# Patient Record
Sex: FEMALE | Race: WHITE | NOT HISPANIC OR LATINO | Employment: OTHER | URBAN - METROPOLITAN AREA
[De-identification: names, ages, dates, MRNs, and addresses within clinical notes are randomized per-mention and may not be internally consistent; named-entity substitution may affect disease eponyms.]

---

## 2017-07-24 ENCOUNTER — OFFICE VISIT (OUTPATIENT)
Dept: CARDIOLOGY | Facility: CLINIC | Age: 62
End: 2017-07-24

## 2017-07-24 VITALS
SYSTOLIC BLOOD PRESSURE: 140 MMHG | HEART RATE: 80 BPM | HEIGHT: 65 IN | DIASTOLIC BLOOD PRESSURE: 62 MMHG | BODY MASS INDEX: 35.29 KG/M2 | WEIGHT: 211.8 LBS

## 2017-07-24 DIAGNOSIS — E11.59 TYPE 2 DIABETES MELLITUS WITH OTHER CIRCULATORY COMPLICATION: ICD-10-CM

## 2017-07-24 DIAGNOSIS — I25.10 CORONARY ARTERY DISEASE INVOLVING NATIVE CORONARY ARTERY OF NATIVE HEART WITHOUT ANGINA PECTORIS: Primary | ICD-10-CM

## 2017-07-24 DIAGNOSIS — E66.09 NON MORBID OBESITY DUE TO EXCESS CALORIES: ICD-10-CM

## 2017-07-24 DIAGNOSIS — E78.49 OTHER HYPERLIPIDEMIA: ICD-10-CM

## 2017-07-24 PROCEDURE — 99214 OFFICE O/P EST MOD 30 MIN: CPT | Performed by: INTERNAL MEDICINE

## 2017-07-24 PROCEDURE — 93000 ELECTROCARDIOGRAM COMPLETE: CPT | Performed by: INTERNAL MEDICINE

## 2017-07-24 RX ORDER — ISOSORBIDE MONONITRATE 30 MG/1
30 TABLET, EXTENDED RELEASE ORAL DAILY
COMMUNITY
End: 2021-08-11

## 2017-07-24 RX ORDER — FUROSEMIDE 20 MG/1
20 TABLET ORAL AS NEEDED
COMMUNITY
End: 2020-03-13 | Stop reason: ALTCHOICE

## 2017-07-24 RX ORDER — LOSARTAN POTASSIUM 100 MG/1
100 TABLET ORAL DAILY
COMMUNITY
End: 2021-07-22

## 2017-07-24 RX ORDER — METOPROLOL SUCCINATE 50 MG/1
50 TABLET, EXTENDED RELEASE ORAL DAILY
COMMUNITY
End: 2020-07-15 | Stop reason: SDUPTHER

## 2017-07-24 RX ORDER — RANITIDINE 150 MG/1
150 TABLET ORAL 2 TIMES DAILY
COMMUNITY
End: 2019-03-12

## 2017-07-24 RX ORDER — AMLODIPINE BESYLATE 5 MG/1
5 TABLET ORAL DAILY
COMMUNITY
End: 2021-07-27 | Stop reason: SDUPTHER

## 2017-07-24 RX ORDER — ASPIRIN 81 MG/1
81 TABLET, CHEWABLE ORAL DAILY
COMMUNITY
End: 2022-12-14 | Stop reason: ALTCHOICE

## 2017-07-24 RX ORDER — RANOLAZINE 500 MG/1
500 TABLET, EXTENDED RELEASE ORAL 2 TIMES DAILY
COMMUNITY
End: 2018-03-09

## 2017-07-24 RX ORDER — SIMVASTATIN 20 MG
20 TABLET ORAL NIGHTLY
COMMUNITY
End: 2020-03-13 | Stop reason: ALTCHOICE

## 2017-07-24 NOTE — PROGRESS NOTES
Date of Office Visit: 2017  Encounter Provider: Gigi Dailey MD  Place of Service: [facility] CARDIOLOGY  Patient Name: Jesse Espinal  :1955  [id number]    Chief Complaint   Patient presents with   • Coronary Artery Disease   :     HPI: Jesse Espinal is a 62 y.o. female  she's here as a new patient she has a history of coronary artery disease in  she had 4 stents put in the [facility] and in  she had 2 stents put in it had her on chronic Brilinta since then she's had some microscopic hematuria and it's been evaluated without source she has a recent history of diagnosis of diabetes and has lost about 10 pounds to stop drinking sweet tea and cut her car back she has hyperlipidemia and has been managed with a low-dose of simvastatin she is a former smoker stopped in  she does have sleep apnea hypertension hyperlipidemia she works at [employer] part-time she has not had any chest pain shortness of breath PND orthopnea and edema she does not exercise much    Past Medical History:   Diagnosis Date   • Acid reflux disease    • Angina pectoris    • Anxiety    • CAD (coronary artery disease)    • Depression    • Diabetes mellitus, type II    • Dysuria    • Fatigue    • Foot pain    • GERD (gastroesophageal reflux disease)    • Hematuria    • Hypercholesterolemia    • Hypertension    • MORRIS (obstructive sleep apnea)        Past Surgical History:   Procedure Laterality Date   • APPENDECTOMY     • CARDIAC CATHETERIZATION  2013    SEVERE 3 VESSEL CAD   • CHOLECYSTECTOMY  2013    DIVERTICULOSIS AND INTERNAL HEMORRHOIDS   • CORONARY ANGIOPLASTY WITH STENT PLACEMENT  x2   • CORONARY STENT PLACEMENT  2013 and 2014    LAD x4   • CYSTOSCOPY  2016   • HYSTERECTOMY     • OOPHORECTOMY         Social History     Social History   • Marital status:      Spouse name: N/A   • Number of children: N/A   • Years of education: N/A     Occupational History    • Not on file.     Social History Main Topics   • Smoking status: Former Smoker     Types: Cigarettes   • Smokeless tobacco: Not on file      Comment: QUIT 2009   • Alcohol use Yes   • Drug use: No   • Sexual activity: Not on file     Other Topics Concern   • Not on file     Social History Narrative       Family History   Problem Relation Age of Onset   • Colon cancer Mother    • Coronary artery disease Mother    • Hypertension Mother    • Breast cancer Mother    • Diabetes Mother    • Heart disease Mother    • Stroke Father    • Liver disease Father    • Coronary artery disease Sister    • Hyperlipidemia Sister    • Coronary artery disease Brother    • Lung cancer Brother    • Hypertension Son        Review of Systems   Constitution: Negative for decreased appetite, fever, malaise/fatigue and weight loss.   HENT: Negative for nosebleeds.    Eyes: Negative for double vision.   Cardiovascular: Negative for chest pain, claudication, cyanosis, dyspnea on exertion, irregular heartbeat, leg swelling, near-syncope, orthopnea, palpitations, paroxysmal nocturnal dyspnea and syncope.   Respiratory: Negative for cough, hemoptysis and shortness of breath.    Hematologic/Lymphatic: Negative for bleeding problem.   Skin: Negative for rash.   Musculoskeletal: Negative for falls and myalgias.   Gastrointestinal: Negative for hematochezia, jaundice, melena, nausea and vomiting.   Genitourinary: Negative for hematuria.   Neurological: Negative for dizziness and seizures.   Psychiatric/Behavioral: Negative for altered mental status and memory loss.       Allergies   Allergen Reactions   • Effient [Prasugrel]    • Other      SHELL FISH   • Sulfa Antibiotics          Current Outpatient Prescriptions:   •  amLODIPine (NORVASC) 5 MG tablet, Take 5 mg by mouth Daily., Disp: , Rfl:   •  aspirin 81 MG chewable tablet, Chew 81 mg Daily., Disp: , Rfl:   •  furosemide (LASIX) 20 MG tablet, Take 20 mg by mouth As Needed., Disp: , Rfl:   •   "isosorbide mononitrate (IMDUR) 30 MG 24 hr tablet, Take 30 mg by mouth Daily., Disp: , Rfl:   •  losartan (COZAAR) 100 MG tablet, Take 100 mg by mouth Daily., Disp: , Rfl:   •  metFORMIN (GLUCOPHAGE) 500 MG tablet, Take 500 mg by mouth Daily With Breakfast., Disp: , Rfl:   •  metoprolol succinate XL (TOPROL-XL) 50 MG 24 hr tablet, Take 50 mg by mouth Daily., Disp: , Rfl:   •  raNITIdine (ZANTAC) 150 MG tablet, Take 150 mg by mouth 2 (Two) Times a Day., Disp: , Rfl:   •  ranolazine (RANEXA) 500 MG 12 hr tablet, Take 500 mg by mouth 2 (Two) Times a Day., Disp: , Rfl:   •  simvastatin (ZOCOR) 20 MG tablet, Take 20 mg by mouth Every Night., Disp: , Rfl:   •  ticagrelor (BRILINTA) 60 MG tablet tablet, Take 60 mg by mouth 2 (Two) Times a Day., Disp: , Rfl:      Objective:     Vitals:    07/24/17 1416   BP: 140/62   Pulse: 80   Weight: 211 lb 12.8 oz (96.1 kg)   Height: 64.5\" (163.8 cm)     Body mass index is 35.79 kg/(m^2).    Physical Exam   Constitutional: She is oriented to person, place, and time. She appears well-developed and well-nourished.   HENT:   Head: Normocephalic.   Eyes: No scleral icterus.   Neck: No JVD present. No thyromegaly present.   Cardiovascular: Normal rate, regular rhythm and normal heart sounds.  Exam reveals no gallop and no friction rub.    No murmur heard.  Pulmonary/Chest: Effort normal and breath sounds normal. She has no wheezes. She has no rales.   Abdominal: Soft. There is no hepatosplenomegaly. There is no tenderness.   Musculoskeletal: Normal range of motion. She exhibits no edema.   Lymphadenopathy:     She has no cervical adenopathy.   Neurological: She is alert and oriented to person, place, and time.   Skin: Skin is warm and dry. No rash noted.   Psychiatric: She has a normal mood and affect.         ECG 12 Lead  Date/Time: 7/24/2017 3:12 PM  Performed by: ARACELI HANSEN  Authorized by: ARACELI HANSEN   Rhythm: sinus rhythm  Clinical impression: normal ECG             Assessment: "       Diagnosis Plan   1. Coronary artery disease involving native coronary artery of native heart without angina pectoris     2. Other hyperlipidemia     3. Type 2 diabetes mellitus with other circulatory complication     4. Non morbid obesity due to excess calories            Plan:       This woman has known disease multiple cardiac risk factors I think is doing well currently from a symptom standpoint I think she indicates desperate risk modification which is critical for her got to discontinue to get her weight down I gave her pamphlet on a plan based diet as well as ask her watch the documentary with the health.  I would like her to increase her activity, to stop her Ranexa today try and get her lipids and her catheter report if her lipids are remotely high when the switch her to Lipitor at 39 and like her to come back and see me in 6 months sooner if she has trouble    Coronary Artery Disease  Assessment  • The patient has no angina    Plan  • Lifestyle modifications discussed include adhering to a heart healthy diet, maintenance of a healthy weight, medication compliance, regular exercise and regular monitoring of cholesterol and blood pressure    Subjective - Objective  • There has been a previous stent procedure using MEERA  • Current antiplatelet therapy includes aspirin 81 mg and ticagrelor 90 mg        As always, it has been a pleasure to participate in your patient's care.      Sincerely,       Gigi Dailey MD

## 2017-12-19 ENCOUNTER — TELEPHONE (OUTPATIENT)
Dept: CARDIOLOGY | Facility: CLINIC | Age: 62
End: 2017-12-19

## 2018-01-22 ENCOUNTER — OFFICE VISIT CONVERTED (OUTPATIENT)
Dept: FAMILY MEDICINE CLINIC | Age: 63
End: 2018-01-22
Attending: NURSE PRACTITIONER

## 2018-03-09 ENCOUNTER — OFFICE VISIT (OUTPATIENT)
Dept: CARDIOLOGY | Facility: CLINIC | Age: 63
End: 2018-03-09

## 2018-03-09 VITALS
SYSTOLIC BLOOD PRESSURE: 144 MMHG | WEIGHT: 209 LBS | HEIGHT: 64 IN | OXYGEN SATURATION: 100 % | DIASTOLIC BLOOD PRESSURE: 78 MMHG | HEART RATE: 74 BPM | BODY MASS INDEX: 35.68 KG/M2

## 2018-03-09 DIAGNOSIS — Z95.5 HISTORY OF CORONARY ARTERY STENT PLACEMENT: ICD-10-CM

## 2018-03-09 DIAGNOSIS — I25.10 CORONARY ARTERY DISEASE INVOLVING NATIVE CORONARY ARTERY OF NATIVE HEART WITHOUT ANGINA PECTORIS: Primary | ICD-10-CM

## 2018-03-09 PROCEDURE — 99213 OFFICE O/P EST LOW 20 MIN: CPT | Performed by: PHYSICIAN ASSISTANT

## 2018-03-09 PROCEDURE — 93000 ELECTROCARDIOGRAM COMPLETE: CPT | Performed by: PHYSICIAN ASSISTANT

## 2018-03-09 NOTE — PROGRESS NOTES
Date of Office Visit: 2018  Encounter Provider: FELIPE Shi  Place of Service: [facility] CARDIOLOGY  Patient Name: Jesse Espinal  :1955    Chief Complaint   Patient presents with   • Coronary Artery Disease     6 month follow up   :     HPI: Jesse Espinal is a 63 y.o. female, new to me, who presents today for follow-up.  Old records have been obtained and reviewed by me.  She is a patient who was first evaluated by Dr. Dailey on 2017.  She has a history of coronary artery disease, and has had stents placed at an outlying facility in  and again in .  She has been on chronic Brilinta therapy ever since.  Dr. Dailey remarked that she did have some microscopic hematuria and his sources never been found.  She also was recently diagnosed with diabetes and cut way back on her sugar and carbohydrates.  She lost 10 pounds.  At that visit she was stable from a cardiac standpoint without complaints of angina or heart failure.  Dr. Dailey remarked that risk factor modification was extremely important for her.  She is here today for a 6 month follow-up.   Over the past 6 months she's been doing fairly well.  She finally joined a gym a few weeks ago.  Her plan is to try to get their 5 days a week, however she's only been a few times since she joint.  She has been able to do some cardiovascular exercise and light weights without much difficulty.  She denies any chest pain, palpitations, shortness of breath, edema, dizziness, or syncope.  She does notice that she is a little short of breath when she starts her exercise routine, but attributes this to being out of shape.  She is still working 2 days a week at [employer], and really went back to work because she missed the people.      Past Medical History:   Diagnosis Date   • Acid reflux disease    • Angina pectoris    • Anxiety    • CAD (coronary artery disease)    • Depression    • Diabetes mellitus, type II     • Dysuria    • Fatigue    • Foot pain    • GERD (gastroesophageal reflux disease)    • Hematuria    • Hypercholesterolemia    • Hypertension    • MORRIS (obstructive sleep apnea)        Past Surgical History:   Procedure Laterality Date   • APPENDECTOMY     • CARDIAC CATHETERIZATION  07/2013    SEVERE 3 VESSEL CAD   • CHOLECYSTECTOMY  01/22/2013    DIVERTICULOSIS AND INTERNAL HEMORRHOIDS   • CORONARY ANGIOPLASTY WITH STENT PLACEMENT  x2   • CORONARY STENT PLACEMENT  7/30/2013 and 9/04/2014    LAD x4   • CYSTOSCOPY  08/04/2016   • HYSTERECTOMY     • OOPHORECTOMY         Social History     Social History   • Marital status:      Spouse name: N/A   • Number of children: N/A   • Years of education: N/A     Occupational History   • Not on file.     Social History Main Topics   • Smoking status: Former Smoker     Types: Cigarettes   • Smokeless tobacco: Never Used      Comment: QUIT 2009   • Alcohol use 0.6 oz/week     1 Glasses of wine per week      Comment: occ   • Drug use: No   • Sexual activity: Defer     Other Topics Concern   • Not on file     Social History Narrative       Family History   Problem Relation Age of Onset   • Colon cancer Mother    • Coronary artery disease Mother    • Hypertension Mother    • Breast cancer Mother    • Diabetes Mother    • Heart disease Mother    • Stroke Father    • Liver disease Father    • Coronary artery disease Sister    • Hyperlipidemia Sister    • Coronary artery disease Brother    • Lung cancer Brother    • Hypertension Son    • No Known Problems Maternal Grandmother    • No Known Problems Maternal Grandfather    • No Known Problems Paternal Grandmother    • No Known Problems Paternal Grandfather        Review of Systems   Constitution: Negative for chills, fever, malaise/fatigue, weight gain and weight loss.   HENT: Negative for ear pain, hearing loss, nosebleeds and sore throat.    Eyes: Negative for double vision, pain and visual disturbance.   Cardiovascular:  Positive for dyspnea on exertion. Negative for chest pain, irregular heartbeat, leg swelling, near-syncope, orthopnea, palpitations, paroxysmal nocturnal dyspnea and syncope.   Respiratory: Negative for cough, shortness of breath, sleep disturbances due to breathing, snoring and wheezing.    Endocrine: Negative for cold intolerance, heat intolerance and polyuria.   Skin: Negative for itching and rash.   Musculoskeletal: Negative for joint pain, joint swelling and myalgias.   Gastrointestinal: Negative for abdominal pain, diarrhea, melena, nausea and vomiting.   Genitourinary: Negative for frequency, hematuria and hesitancy.   Neurological: Negative for excessive daytime sleepiness, headaches, light-headedness, numbness, paresthesias and seizures.   Psychiatric/Behavioral: Negative for altered mental status and depression.   Allergic/Immunologic: Negative.    All other systems reviewed and are negative.      Allergies   Allergen Reactions   • Effient [Prasugrel]    • Other      SHELL FISH   • Sulfa Antibiotics          Current Outpatient Prescriptions:   •  amLODIPine (NORVASC) 5 MG tablet, Take 5 mg by mouth Daily., Disp: , Rfl:   •  aspirin 81 MG chewable tablet, Chew 81 mg Daily., Disp: , Rfl:   •  furosemide (LASIX) 20 MG tablet, Take 20 mg by mouth As Needed., Disp: , Rfl:   •  isosorbide mononitrate (IMDUR) 30 MG 24 hr tablet, Take 30 mg by mouth Daily., Disp: , Rfl:   •  losartan (COZAAR) 100 MG tablet, Take 100 mg by mouth Daily., Disp: , Rfl:   •  metFORMIN (GLUCOPHAGE) 500 MG tablet, Take 500 mg by mouth Daily With Breakfast., Disp: , Rfl:   •  metoprolol succinate XL (TOPROL-XL) 50 MG 24 hr tablet, Take 50 mg by mouth Daily., Disp: , Rfl:   •  raNITIdine (ZANTAC) 150 MG tablet, Take 150 mg by mouth 2 (Two) Times a Day., Disp: , Rfl:   •  simvastatin (ZOCOR) 20 MG tablet, Take 20 mg by mouth Every Night., Disp: , Rfl:   •  ticagrelor (BRILINTA) 60 MG tablet tablet, Take 60 mg by mouth 2 (Two) Times a Day.,  "Disp: , Rfl:      Objective:     Vitals:    03/09/18 1456 03/09/18 1506   BP: 136/72 144/78   BP Location: Right arm Left arm   Pulse: 74    SpO2: 100%    Weight: 94.8 kg (209 lb)    Height: 162.6 cm (64\")      Body mass index is 35.87 kg/(m^2).    PHYSICAL EXAM:    Physical Exam   Constitutional: She is oriented to person, place, and time. She appears well-developed and well-nourished. No distress.   HENT:   Head: Normocephalic and atraumatic.   Eyes: Pupils are equal, round, and reactive to light.   Neck: No JVD present. No thyromegaly present.   Cardiovascular: Normal rate, regular rhythm, normal heart sounds and intact distal pulses.    No murmur heard.  Pulmonary/Chest: Effort normal and breath sounds normal. No respiratory distress.   Abdominal: Soft. Bowel sounds are normal. She exhibits no distension. There is no splenomegaly or hepatomegaly. There is no tenderness.   Musculoskeletal: Normal range of motion. She exhibits no edema.   Neurological: She is alert and oriented to person, place, and time.   Skin: Skin is warm and dry. She is not diaphoretic. No erythema.   Psychiatric: She has a normal mood and affect. Her behavior is normal. Judgment normal.         ECG 12 Lead  Date/Time: 3/9/2018 3:11 PM  Performed by: JAXON VAN.  Authorized by: JAXON VAN.   Comparison: compared with previous ECG from 7/24/2017  Similar to previous ECG  Rhythm: sinus rhythm  BPM: 74  Clinical impression: normal ECG  Comments: Indication: Coronary artery disease, status post stent placement.              Assessment:       Diagnosis Plan   1. Coronary artery disease involving native coronary artery of native heart without angina pectoris  ECG 12 Lead   2. History of coronary artery stent placement  ECG 12 Lead     Orders Placed This Encounter   Procedures   • ECG 12 Lead     This order was created via procedure documentation          Plan:       1.  Coronary Artery Disease  Assessment  • The patient has no " angina    Plan  • Lifestyle modifications discussed include adhering to a heart healthy diet, maintenance of a healthy weight and regular exercise    Subjective - Objective  • There has been a previous stent procedure using MEERA  • Current antiplatelet therapy includes aspirin 81 mg and ticagrelor 90 mg  • Overall she is doing well.  She has no complete of angina or heart failure.  We did talk about getting regular exercise and a heart healthy diet.  I'm not going to make any changes to her medical regimen, and she will follow-up with Dr. Dailey in 1 year or sooner if needed.      As always, it has been a pleasure to participate in your patient's care.      Sincerely,         Aura Madden PA-C

## 2018-06-14 RX ORDER — TICAGRELOR 60 MG/1
TABLET ORAL
Qty: 180 TABLET | Refills: 0 | Status: SHIPPED | OUTPATIENT
Start: 2018-06-14 | End: 2018-09-28 | Stop reason: SDUPTHER

## 2018-07-12 ENCOUNTER — OFFICE VISIT CONVERTED (OUTPATIENT)
Dept: FAMILY MEDICINE CLINIC | Age: 63
End: 2018-07-12
Attending: NURSE PRACTITIONER

## 2018-09-20 ENCOUNTER — CONVERSION ENCOUNTER (OUTPATIENT)
Dept: PODIATRY | Facility: CLINIC | Age: 63
End: 2018-09-20

## 2018-09-20 ENCOUNTER — OFFICE VISIT CONVERTED (OUTPATIENT)
Dept: PODIATRY | Facility: CLINIC | Age: 63
End: 2018-09-20
Attending: PODIATRIST

## 2018-09-28 RX ORDER — TICAGRELOR 60 MG/1
TABLET ORAL
Qty: 180 TABLET | Refills: 2 | Status: SHIPPED | OUTPATIENT
Start: 2018-09-28 | End: 2019-06-19 | Stop reason: SDUPTHER

## 2019-01-16 ENCOUNTER — HOSPITAL ENCOUNTER (OUTPATIENT)
Dept: OTHER | Facility: HOSPITAL | Age: 64
Discharge: HOME | End: 2019-01-16
Attending: NURSE PRACTITIONER

## 2019-01-16 ENCOUNTER — OFFICE VISIT CONVERTED (OUTPATIENT)
Dept: FAMILY MEDICINE CLINIC | Age: 64
End: 2019-01-16
Attending: NURSE PRACTITIONER

## 2019-01-16 LAB
ALBUMIN SERPL-MCNC: 4.6 G/DL (ref 3.5–5)
ALBUMIN/GLOB SERPL: 1.2 {RATIO} (ref 1.4–2.6)
ALP SERPL-CCNC: 69 U/L (ref 43–160)
ALT SERPL-CCNC: 22 U/L (ref 10–40)
ANION GAP SERPL CALC-SCNC: 18 MMOL/L (ref 8–19)
APPEARANCE UR: CLEAR
AST SERPL-CCNC: 22 U/L (ref 15–50)
BACTERIA UR QL AUTO: ABNORMAL
BILIRUB SERPL-MCNC: 0.49 MG/DL (ref 0.2–1.3)
BILIRUB UR QL: NEGATIVE
BUN SERPL-MCNC: 16 MG/DL (ref 5–25)
BUN/CREAT SERPL: 13 {RATIO} (ref 6–20)
CALCIUM SERPL-MCNC: 9.9 MG/DL (ref 8.7–10.4)
CASTS URNS QL MICRO: ABNORMAL /[LPF]
CHLORIDE SERPL-SCNC: 105 MMOL/L (ref 99–111)
CHOLEST SERPL-MCNC: 179 MG/DL (ref 107–200)
CHOLEST/HDLC SERPL: 4.8 {RATIO} (ref 3–6)
COLOR UR: YELLOW
CONV CO2: 22 MMOL/L (ref 22–32)
CONV LEUKOCYTE ESTERASE: NEGATIVE
CONV TOTAL PROTEIN: 8.3 G/DL (ref 6.3–8.2)
CONV UROBILINOGEN IN URINE BY AUTOMATED TEST STRIP: 0.2 {EHRLICHU}/DL (ref 0.1–1)
CREAT UR-MCNC: 1.27 MG/DL (ref 0.5–0.9)
EPI CELLS #/AREA URNS HPF: ABNORMAL /[HPF]
EST. AVERAGE GLUCOSE BLD GHB EST-MCNC: 123 MG/DL
GFR SERPLBLD BASED ON 1.73 SQ M-ARVRAT: 44 ML/MIN/{1.73_M2}
GLOBULIN UR ELPH-MCNC: 3.7 G/DL (ref 2–3.5)
GLUCOSE 24H UR-MCNC: NEGATIVE MG/DL
GLUCOSE SERPL-MCNC: 121 MG/DL (ref 65–99)
HBA1C MFR BLD: 5.9 % (ref 3.5–5.7)
HDLC SERPL-MCNC: 37 MG/DL (ref 40–60)
HGB UR QL STRIP: ABNORMAL
KETONES UR QL STRIP: NEGATIVE MG/DL
LDLC SERPL CALC-MCNC: 99 MG/DL (ref 70–100)
MUCOUS THREADS URNS QL MICRO: ABNORMAL
NITRITE UR-MCNC: NEGATIVE MG/ML
OSMOLALITY SERPL CALC.SUM OF ELEC: 292 MOSM/KG (ref 273–304)
PH UR STRIP.AUTO: 5.5 [PH] (ref 5–8)
POTASSIUM SERPL-SCNC: 4.7 MMOL/L (ref 3.5–5.3)
PROT UR-MCNC: 30 MG/DL
RBC # BLD AUTO: ABNORMAL /[HPF]
SODIUM SERPL-SCNC: 140 MMOL/L (ref 135–147)
SP GR UR STRIP: 1.01 (ref 1–1.03)
SPECIMEN SOURCE: ABNORMAL
TRIGL SERPL-MCNC: 216 MG/DL (ref 40–150)
UNIDENT CRYS URNS QL MICRO: ABNORMAL /[HPF]
VLDLC SERPL-MCNC: 43 MG/DL (ref 5–37)
WBC #/AREA URNS HPF: ABNORMAL /[HPF]

## 2019-01-25 ENCOUNTER — HOSPITAL ENCOUNTER (OUTPATIENT)
Dept: OTHER | Facility: HOSPITAL | Age: 64
Discharge: HOME | End: 2019-01-25
Attending: NURSE PRACTITIONER

## 2019-01-25 LAB
APPEARANCE UR: CLEAR
BACTERIA UR QL AUTO: ABNORMAL
BILIRUB UR QL: NEGATIVE
CASTS URNS QL MICRO: ABNORMAL /[LPF]
COLOR UR: YELLOW
CONV LEUKOCYTE ESTERASE: NEGATIVE
CONV UROBILINOGEN IN URINE BY AUTOMATED TEST STRIP: 0.2 {EHRLICHU}/DL (ref 0.1–1)
EPI CELLS #/AREA URNS HPF: ABNORMAL /[HPF]
GLUCOSE 24H UR-MCNC: NEGATIVE MG/DL
HGB UR QL STRIP: ABNORMAL
KETONES UR QL STRIP: NEGATIVE MG/DL
MUCOUS THREADS URNS QL MICRO: ABNORMAL
NITRITE UR-MCNC: NEGATIVE MG/ML
PH UR STRIP.AUTO: 6 [PH] (ref 5–8)
PROT UR-MCNC: 100 MG/DL
RBC # BLD AUTO: ABNORMAL /[HPF]
SP GR UR STRIP: 1.01 (ref 1–1.03)
SPECIMEN SOURCE: ABNORMAL
UNIDENT CRYS URNS QL MICRO: ABNORMAL /[HPF]
WBC #/AREA URNS HPF: ABNORMAL /[HPF]

## 2019-01-27 LAB — BACTERIA UR CULT: NORMAL

## 2019-03-12 ENCOUNTER — OFFICE VISIT (OUTPATIENT)
Dept: CARDIOLOGY | Facility: CLINIC | Age: 64
End: 2019-03-12

## 2019-03-12 VITALS
HEIGHT: 64 IN | BODY MASS INDEX: 36.12 KG/M2 | HEART RATE: 73 BPM | DIASTOLIC BLOOD PRESSURE: 88 MMHG | WEIGHT: 211.6 LBS | SYSTOLIC BLOOD PRESSURE: 160 MMHG

## 2019-03-12 DIAGNOSIS — E66.01 CLASS 2 SEVERE OBESITY DUE TO EXCESS CALORIES WITH SERIOUS COMORBIDITY AND BODY MASS INDEX (BMI) OF 36.0 TO 36.9 IN ADULT (HCC): ICD-10-CM

## 2019-03-12 DIAGNOSIS — I25.10 CORONARY ARTERY DISEASE INVOLVING NATIVE CORONARY ARTERY OF NATIVE HEART WITHOUT ANGINA PECTORIS: Primary | ICD-10-CM

## 2019-03-12 DIAGNOSIS — E78.49 OTHER HYPERLIPIDEMIA: ICD-10-CM

## 2019-03-12 DIAGNOSIS — Z95.5 HISTORY OF CORONARY ARTERY STENT PLACEMENT: ICD-10-CM

## 2019-03-12 DIAGNOSIS — E11.8 TYPE 2 DIABETES MELLITUS WITH COMPLICATION, WITHOUT LONG-TERM CURRENT USE OF INSULIN (HCC): ICD-10-CM

## 2019-03-12 PROBLEM — E66.812 CLASS 2 SEVERE OBESITY DUE TO EXCESS CALORIES WITH SERIOUS COMORBIDITY AND BODY MASS INDEX (BMI) OF 36.0 TO 36.9 IN ADULT: Status: ACTIVE | Noted: 2019-03-12

## 2019-03-12 PROCEDURE — 99214 OFFICE O/P EST MOD 30 MIN: CPT | Performed by: INTERNAL MEDICINE

## 2019-03-12 PROCEDURE — 93000 ELECTROCARDIOGRAM COMPLETE: CPT | Performed by: INTERNAL MEDICINE

## 2019-03-12 NOTE — PROGRESS NOTES
Date of Office Visit: 19  Encounter Provider: Gigi Dailey MD  Place of Service: [facility] CARDIOLOGY  Patient Name: Jesse Espinal  :1955  [id number]    Chief Complaint   Patient presents with   • Coronary Artery Disease   :     HPI: Jesse Espinal is a 64 y.o. female  she's here as a new patient she has a history of coronary artery disease in  she had 4 stents put in the [facility] and in  she had 2 stents put in it had her on chronic Brilinta since then she's had some microscopic hematuria and it's been evaluated without source she has a recent history of diagnosis of diabetes and has lost about 10 pounds to stop drinking sweet tea and cut her car back she has hyperlipidemia and has been managed with a low-dose of simvastatin she is a former smoker stopped in  she does have sleep apnea hypertension hyperlipidemia.    She is here for followup and is doing pretty well. No chest pain, shortness of breath, PND, orthopnea, or edema. She has been told she has stage 3 kidney disease. She does not really have any more blood in her urine. No other bleeding problems.        Past Medical History:   Diagnosis Date   • Acid reflux disease    • Angina pectoris (CMS/HCC)    • Anxiety    • CAD (coronary artery disease)    • Depression    • Diabetes mellitus, type II (CMS/HCC)    • Dysuria    • Fatigue    • Foot pain    • GERD (gastroesophageal reflux disease)    • Hematuria    • Hypercholesterolemia    • Hypertension    • MORRIS (obstructive sleep apnea)        Past Surgical History:   Procedure Laterality Date   • APPENDECTOMY     • CARDIAC CATHETERIZATION  2013    SEVERE 3 VESSEL CAD   • CHOLECYSTECTOMY  2013    DIVERTICULOSIS AND INTERNAL HEMORRHOIDS   • CORONARY ANGIOPLASTY WITH STENT PLACEMENT  x2   • CORONARY STENT PLACEMENT  2013 and 2014    LAD x4   • CYSTOSCOPY  2016   • HYSTERECTOMY     • OOPHORECTOMY         Social History      Socioeconomic History   • Marital status:      Spouse name: Not on file   • Number of children: Not on file   • Years of education: Not on file   • Highest education level: Not on file   Social Needs   • Financial resource strain: Not on file   • Food insecurity - worry: Not on file   • Food insecurity - inability: Not on file   • Transportation needs - medical: Not on file   • Transportation needs - non-medical: Not on file   Occupational History   • Not on file   Tobacco Use   • Smoking status: Former Smoker     Types: Cigarettes   • Smokeless tobacco: Never Used   • Tobacco comment: QUIT 2009   Substance and Sexual Activity   • Alcohol use: Yes     Alcohol/week: 0.6 oz     Types: 1 Glasses of wine per week     Comment: occ   • Drug use: No   • Sexual activity: Defer   Other Topics Concern   • Not on file   Social History Narrative   • Not on file       Family History   Problem Relation Age of Onset   • Colon cancer Mother    • Coronary artery disease Mother    • Hypertension Mother    • Breast cancer Mother    • Diabetes Mother    • Heart disease Mother    • Stroke Father    • Liver disease Father    • Coronary artery disease Sister    • Hyperlipidemia Sister    • Coronary artery disease Brother    • Lung cancer Brother    • Hypertension Son    • No Known Problems Maternal Grandmother    • No Known Problems Maternal Grandfather    • No Known Problems Paternal Grandmother    • No Known Problems Paternal Grandfather        Review of Systems   Constitution: Negative for decreased appetite, fever, malaise/fatigue and weight loss.   HENT: Negative for nosebleeds.    Eyes: Negative for double vision.   Cardiovascular: Negative for chest pain, claudication, cyanosis, dyspnea on exertion, irregular heartbeat, leg swelling, near-syncope, orthopnea, palpitations, paroxysmal nocturnal dyspnea and syncope.   Respiratory: Negative for cough, hemoptysis and shortness of breath.    Hematologic/Lymphatic: Negative for  "bleeding problem.   Skin: Negative for rash.   Musculoskeletal: Negative for falls and myalgias.   Gastrointestinal: Negative for hematochezia, jaundice, melena, nausea and vomiting.   Genitourinary: Negative for hematuria.   Neurological: Negative for dizziness and seizures.   Psychiatric/Behavioral: Negative for altered mental status and memory loss.       Allergies   Allergen Reactions   • Effient [Prasugrel]    • Other      SHELL FISH   • Sulfa Antibiotics          Current Outpatient Medications:   •  amLODIPine (NORVASC) 5 MG tablet, Take 5 mg by mouth Daily., Disp: , Rfl:   •  aspirin 81 MG chewable tablet, Chew 81 mg Daily., Disp: , Rfl:   •  BRILINTA 60 MG tablet tablet, TAKE 1 TABLET BY MOUTH EVERY 12 HOURS, Disp: 180 tablet, Rfl: 2  •  furosemide (LASIX) 20 MG tablet, Take 20 mg by mouth As Needed., Disp: , Rfl:   •  isosorbide mononitrate (IMDUR) 30 MG 24 hr tablet, Take 30 mg by mouth Daily., Disp: , Rfl:   •  losartan (COZAAR) 100 MG tablet, Take 100 mg by mouth Daily., Disp: , Rfl:   •  metFORMIN (GLUCOPHAGE) 500 MG tablet, Take 500 mg by mouth Daily With Breakfast., Disp: , Rfl:   •  metoprolol succinate XL (TOPROL-XL) 50 MG 24 hr tablet, Take 50 mg by mouth Daily., Disp: , Rfl:   •  simvastatin (ZOCOR) 20 MG tablet, Take 20 mg by mouth Every Night., Disp: , Rfl:      Objective:     Vitals:    03/12/19 1326   BP: 160/88   Pulse: 73   Weight: 96 kg (211 lb 9.6 oz)   Height: 162.6 cm (64\")     Body mass index is 36.32 kg/m².    Physical Exam   Constitutional: She is oriented to person, place, and time. She appears well-developed and well-nourished.   HENT:   Head: Normocephalic.   Eyes: No scleral icterus.   Neck: No JVD present. No thyromegaly present.   Cardiovascular: Normal rate, regular rhythm and normal heart sounds. Exam reveals no gallop and no friction rub.   No murmur heard.  Pulmonary/Chest: Effort normal and breath sounds normal. She has no wheezes. She has no rales.   Abdominal: Soft. There " is no hepatosplenomegaly. There is no tenderness.   Musculoskeletal: Normal range of motion. She exhibits no edema.   Lymphadenopathy:     She has no cervical adenopathy.   Neurological: She is alert and oriented to person, place, and time.   Skin: Skin is warm and dry. No rash noted.   Psychiatric: She has a normal mood and affect.         ECG 12 Lead  Date/Time: 3/12/2019 1:49 PM  Performed by: Gigi Dailey MD  Authorized by: Gigi Dailey MD   Comparison: compared with previous ECG   Similar to previous ECG  Rhythm: sinus rhythm    Clinical impression: normal ECG             Assessment:      No diagnosis found.       Plan:     In general, I think she is doing okay.  Her risk modification could be a little better but her A1c is great and her LDL is under 100.  In the past, when they have increased her simvastatin, she had a lot more muscle aches, so we are going to stay at the 20 mg.  Her blood pressure, she says, is usually better than what we are measuring it at today.  She is otherwise unchanged.  I talked with her about the Brilinta.  We are going to stay on it for now.  I am going to have her come back and see either Aura or Hiwot in a year and then see me in two years.       Coronary Artery Disease  Assessment  • The patient has no angina    Plan  • Lifestyle modifications discussed include adhering to a heart healthy diet, maintenance of a healthy weight, medication compliance, regular exercise and regular monitoring of cholesterol and blood pressure    Subjective - Objective  • There has been a previous stent procedure using MEERA  • Current antiplatelet therapy includes aspirin 81 mg and ticagrelor 90 mg        As always, it has been a pleasure to participate in your patient's care.      Sincerely,       Gigi Dailey MD

## 2019-03-14 ENCOUNTER — HOSPITAL ENCOUNTER (OUTPATIENT)
Dept: OTHER | Facility: HOSPITAL | Age: 64
Discharge: HOME | End: 2019-03-14
Attending: INTERNAL MEDICINE

## 2019-03-14 LAB
ALBUMIN SERPL-MCNC: 4.3 G/DL (ref 3.5–5)
ALBUMIN/GLOB SERPL: 1.1 {RATIO} (ref 1.4–2.6)
ALP SERPL-CCNC: 75 U/L (ref 43–160)
ALT SERPL-CCNC: 20 U/L (ref 10–40)
ANION GAP SERPL CALC-SCNC: 20 MMOL/L (ref 8–19)
APPEARANCE UR: CLEAR
AST SERPL-CCNC: 18 U/L (ref 15–50)
BILIRUB SERPL-MCNC: 0.31 MG/DL (ref 0.2–1.3)
BILIRUB UR QL: NEGATIVE
BUN SERPL-MCNC: 24 MG/DL (ref 5–25)
BUN/CREAT SERPL: 19 {RATIO} (ref 6–20)
CALCIUM SERPL-MCNC: 10.5 MG/DL (ref 8.7–10.4)
CHLORIDE SERPL-SCNC: 104 MMOL/L (ref 99–111)
COLOR UR: YELLOW
CONV BACTERIA: NEGATIVE
CONV CO2: 21 MMOL/L (ref 22–32)
CONV COLLECTION SOURCE (UA): ABNORMAL
CONV CREATININE URINE, RANDOM: 132.4 MG/DL (ref 10–300)
CONV HYALINE CASTS IN URINE MICRO: ABNORMAL /[LPF]
CONV MICROALBUM.,U,RANDOM: 522 MG/L (ref 0–20)
CONV PROTEIN TO CREATININE RATIO (RANDOM URINE): 0.6 {RATIO} (ref 0–0.1)
CONV TOTAL PROTEIN: 8.2 G/DL (ref 6.3–8.2)
CONV UROBILINOGEN IN URINE BY AUTOMATED TEST STRIP: 0.2 {EHRLICHU}/DL (ref 0.1–1)
CREAT UR-MCNC: 1.24 MG/DL (ref 0.5–0.9)
GFR SERPLBLD BASED ON 1.73 SQ M-ARVRAT: 46 ML/MIN/{1.73_M2}
GLOBULIN UR ELPH-MCNC: 3.9 G/DL (ref 2–3.5)
GLUCOSE SERPL-MCNC: 107 MG/DL (ref 65–99)
GLUCOSE UR QL: NEGATIVE MG/DL
HGB UR QL STRIP: ABNORMAL
KETONES UR QL STRIP: NEGATIVE MG/DL
LEUKOCYTE ESTERASE UR QL STRIP: NEGATIVE
MICROALBUMIN/CREAT UR: 394.3 MG/G{CRE} (ref 0–35)
NITRITE UR QL STRIP: NEGATIVE
OSMOLALITY SERPL CALC.SUM OF ELEC: 297 MOSM/KG (ref 273–304)
PH UR STRIP.AUTO: 5 [PH] (ref 5–8)
POTASSIUM SERPL-SCNC: 4.4 MMOL/L (ref 3.5–5.3)
PROT UR QL: 100 MG/DL
PROT UR-MCNC: 79.2 MG/DL
RBC #/AREA URNS HPF: ABNORMAL /[HPF]
SODIUM SERPL-SCNC: 141 MMOL/L (ref 135–147)
SP GR UR: 1.02 (ref 1–1.03)
SQUAMOUS SPT QL MICRO: ABNORMAL /[HPF]
WBC #/AREA URNS HPF: ABNORMAL /[HPF]

## 2019-04-17 ENCOUNTER — OFFICE VISIT CONVERTED (OUTPATIENT)
Dept: UROLOGY | Facility: CLINIC | Age: 64
End: 2019-04-17
Attending: UROLOGY

## 2019-04-17 ENCOUNTER — CONVERSION ENCOUNTER (OUTPATIENT)
Dept: SURGERY | Facility: CLINIC | Age: 64
End: 2019-04-17

## 2019-06-19 RX ORDER — TICAGRELOR 60 MG/1
TABLET ORAL
Qty: 180 TABLET | Refills: 0 | Status: SHIPPED | OUTPATIENT
Start: 2019-06-19 | End: 2019-09-17 | Stop reason: SDUPTHER

## 2019-07-17 ENCOUNTER — OFFICE VISIT CONVERTED (OUTPATIENT)
Dept: FAMILY MEDICINE CLINIC | Age: 64
End: 2019-07-17
Attending: NURSE PRACTITIONER

## 2019-07-17 ENCOUNTER — HOSPITAL ENCOUNTER (OUTPATIENT)
Dept: OTHER | Facility: HOSPITAL | Age: 64
Discharge: HOME | End: 2019-07-17
Attending: NURSE PRACTITIONER

## 2019-07-17 LAB
ALBUMIN SERPL-MCNC: 4.5 G/DL (ref 3.5–5)
ALBUMIN/GLOB SERPL: 1.3 {RATIO} (ref 1.4–2.6)
ALP SERPL-CCNC: 78 U/L (ref 43–160)
ALT SERPL-CCNC: 22 U/L (ref 10–40)
ANION GAP SERPL CALC-SCNC: 20 MMOL/L (ref 8–19)
AST SERPL-CCNC: 19 U/L (ref 15–50)
BILIRUB SERPL-MCNC: 0.36 MG/DL (ref 0.2–1.3)
BUN SERPL-MCNC: 17 MG/DL (ref 5–25)
BUN/CREAT SERPL: 16 {RATIO} (ref 6–20)
CALCIUM SERPL-MCNC: 9.9 MG/DL (ref 8.7–10.4)
CHLORIDE SERPL-SCNC: 104 MMOL/L (ref 99–111)
CHOLEST SERPL-MCNC: 140 MG/DL (ref 107–200)
CHOLEST/HDLC SERPL: 3.8 {RATIO} (ref 3–6)
CONV CO2: 21 MMOL/L (ref 22–32)
CONV TOTAL PROTEIN: 8 G/DL (ref 6.3–8.2)
CREAT UR-MCNC: 1.07 MG/DL (ref 0.5–0.9)
EST. AVERAGE GLUCOSE BLD GHB EST-MCNC: 128 MG/DL
GFR SERPLBLD BASED ON 1.73 SQ M-ARVRAT: 54 ML/MIN/{1.73_M2}
GLOBULIN UR ELPH-MCNC: 3.5 G/DL (ref 2–3.5)
GLUCOSE SERPL-MCNC: 116 MG/DL (ref 65–99)
HBA1C MFR BLD: 6.1 % (ref 3.5–5.7)
HDLC SERPL-MCNC: 37 MG/DL (ref 40–60)
LDLC SERPL CALC-MCNC: 74 MG/DL (ref 70–100)
OSMOLALITY SERPL CALC.SUM OF ELEC: 293 MOSM/KG (ref 273–304)
POTASSIUM SERPL-SCNC: 4.5 MMOL/L (ref 3.5–5.3)
SODIUM SERPL-SCNC: 140 MMOL/L (ref 135–147)
TRIGL SERPL-MCNC: 143 MG/DL (ref 40–150)
VLDLC SERPL-MCNC: 29 MG/DL (ref 5–37)

## 2019-09-11 ENCOUNTER — HOSPITAL ENCOUNTER (OUTPATIENT)
Dept: OTHER | Facility: HOSPITAL | Age: 64
Discharge: HOME | End: 2019-09-11
Attending: INTERNAL MEDICINE

## 2019-09-11 LAB
ALBUMIN SERPL-MCNC: 4.3 G/DL (ref 3.5–5)
ALBUMIN/GLOB SERPL: 1.2 {RATIO} (ref 1.4–2.6)
ALP SERPL-CCNC: 75 U/L (ref 43–160)
ALT SERPL-CCNC: 23 U/L (ref 10–40)
ANION GAP SERPL CALC-SCNC: 17 MMOL/L (ref 8–19)
APPEARANCE UR: CLEAR
AST SERPL-CCNC: 20 U/L (ref 15–50)
BILIRUB SERPL-MCNC: 0.33 MG/DL (ref 0.2–1.3)
BILIRUB UR QL: NEGATIVE
BUN SERPL-MCNC: 15 MG/DL (ref 5–25)
BUN/CREAT SERPL: 15 {RATIO} (ref 6–20)
CALCIUM SERPL-MCNC: 9.8 MG/DL (ref 8.7–10.4)
CHLORIDE SERPL-SCNC: 103 MMOL/L (ref 99–111)
COLOR UR: YELLOW
CONV CO2: 22 MMOL/L (ref 22–32)
CONV CREATININE URINE, RANDOM: 71 MG/DL (ref 10–300)
CONV LEUKOCYTE ESTERASE: NEGATIVE
CONV MICROALBUM.,U,RANDOM: 97.2 MG/L (ref 0–20)
CONV PROTEIN TO CREATININE RATIO (RANDOM URINE): 0.27 {RATIO} (ref 0–0.1)
CONV TOTAL PROTEIN: 7.8 G/DL (ref 6.3–8.2)
CONV UROBILINOGEN IN URINE BY AUTOMATED TEST STRIP: 0.2 {EHRLICHU}/DL (ref 0.1–1)
CREAT UR-MCNC: 0.97 MG/DL (ref 0.5–0.9)
EST. AVERAGE GLUCOSE BLD GHB EST-MCNC: 140 MG/DL
GFR SERPLBLD BASED ON 1.73 SQ M-ARVRAT: >60 ML/MIN/{1.73_M2}
GLOBULIN UR ELPH-MCNC: 3.5 G/DL (ref 2–3.5)
GLUCOSE 24H UR-MCNC: NEGATIVE MG/DL
GLUCOSE SERPL-MCNC: 120 MG/DL (ref 65–99)
HBA1C MFR BLD: 6.5 % (ref 3.5–5.7)
HGB UR QL STRIP: ABNORMAL
KETONES UR QL STRIP: NEGATIVE MG/DL
MICROALBUMIN/CREAT UR: 136.9 MG/G{CRE} (ref 0–35)
NITRITE UR-MCNC: NEGATIVE MG/ML
OSMOLALITY SERPL CALC.SUM OF ELEC: 288 MOSM/KG (ref 273–304)
PH UR STRIP.AUTO: 6 [PH] (ref 5–8)
POTASSIUM SERPL-SCNC: 4.4 MMOL/L (ref 3.5–5.3)
PROT UR-MCNC: 19.1 MG/DL
PROT UR-MCNC: ABNORMAL MG/DL
SODIUM SERPL-SCNC: 138 MMOL/L (ref 135–147)
SP GR UR STRIP: 1.01 (ref 1–1.03)
SPECIMEN SOURCE: ABNORMAL

## 2019-09-17 RX ORDER — TICAGRELOR 60 MG/1
TABLET ORAL
Qty: 180 TABLET | Refills: 0 | Status: SHIPPED | OUTPATIENT
Start: 2019-09-17 | End: 2019-12-23

## 2019-09-19 ENCOUNTER — OFFICE VISIT CONVERTED (OUTPATIENT)
Dept: PODIATRY | Facility: CLINIC | Age: 64
End: 2019-09-19
Attending: PODIATRIST

## 2019-12-23 RX ORDER — TICAGRELOR 60 MG/1
TABLET ORAL
Qty: 180 TABLET | Refills: 1 | Status: SHIPPED | OUTPATIENT
Start: 2019-12-23 | End: 2020-04-03 | Stop reason: SDUPTHER

## 2020-01-22 ENCOUNTER — OFFICE VISIT CONVERTED (OUTPATIENT)
Dept: FAMILY MEDICINE CLINIC | Age: 65
End: 2020-01-22
Attending: NURSE PRACTITIONER

## 2020-01-22 ENCOUNTER — HOSPITAL ENCOUNTER (OUTPATIENT)
Dept: OTHER | Facility: HOSPITAL | Age: 65
Discharge: HOME | End: 2020-01-22
Attending: NURSE PRACTITIONER

## 2020-01-22 LAB
ALBUMIN SERPL-MCNC: 4.5 G/DL (ref 3.5–5)
ALBUMIN/GLOB SERPL: 1.2 {RATIO} (ref 1.4–2.6)
ALP SERPL-CCNC: 77 U/L (ref 43–160)
ALT SERPL-CCNC: 25 U/L (ref 10–40)
ANION GAP SERPL CALC-SCNC: 20 MMOL/L (ref 8–19)
AST SERPL-CCNC: 24 U/L (ref 15–50)
BILIRUB SERPL-MCNC: 0.55 MG/DL (ref 0.2–1.3)
BUN SERPL-MCNC: 13 MG/DL (ref 5–25)
BUN/CREAT SERPL: 13 {RATIO} (ref 6–20)
CALCIUM SERPL-MCNC: 10 MG/DL (ref 8.7–10.4)
CHLORIDE SERPL-SCNC: 104 MMOL/L (ref 99–111)
CHOLEST SERPL-MCNC: 168 MG/DL (ref 107–200)
CHOLEST/HDLC SERPL: 4.8 {RATIO} (ref 3–6)
CONV CO2: 20 MMOL/L (ref 22–32)
CONV TOTAL PROTEIN: 8.2 G/DL (ref 6.3–8.2)
CREAT UR-MCNC: 1.04 MG/DL (ref 0.5–0.9)
EST. AVERAGE GLUCOSE BLD GHB EST-MCNC: 131 MG/DL
GFR SERPLBLD BASED ON 1.73 SQ M-ARVRAT: 56 ML/MIN/{1.73_M2}
GLOBULIN UR ELPH-MCNC: 3.7 G/DL (ref 2–3.5)
GLUCOSE SERPL-MCNC: 100 MG/DL (ref 65–99)
HBA1C MFR BLD: 6.2 % (ref 3.5–5.7)
HDLC SERPL-MCNC: 35 MG/DL (ref 40–60)
LDLC SERPL CALC-MCNC: 96 MG/DL (ref 70–100)
OSMOLALITY SERPL CALC.SUM OF ELEC: 288 MOSM/KG (ref 273–304)
POTASSIUM SERPL-SCNC: 4.5 MMOL/L (ref 3.5–5.3)
SODIUM SERPL-SCNC: 139 MMOL/L (ref 135–147)
TRIGL SERPL-MCNC: 185 MG/DL (ref 40–150)
VLDLC SERPL-MCNC: 37 MG/DL (ref 5–37)

## 2020-02-07 ENCOUNTER — HOSPITAL ENCOUNTER (OUTPATIENT)
Dept: OTHER | Facility: HOSPITAL | Age: 65
Discharge: HOME | End: 2020-02-07
Attending: NURSE PRACTITIONER

## 2020-03-11 ENCOUNTER — HOSPITAL ENCOUNTER (OUTPATIENT)
Dept: OTHER | Facility: HOSPITAL | Age: 65
Discharge: HOME | End: 2020-03-11
Attending: INTERNAL MEDICINE

## 2020-03-11 LAB
ALBUMIN SERPL-MCNC: 4.2 G/DL (ref 3.5–5)
ALBUMIN/GLOB SERPL: 1.3 {RATIO} (ref 1.4–2.6)
ALP SERPL-CCNC: 71 U/L (ref 43–160)
ALT SERPL-CCNC: 23 U/L (ref 10–40)
ANION GAP SERPL CALC-SCNC: 16 MMOL/L (ref 8–19)
APPEARANCE UR: CLEAR
AST SERPL-CCNC: 18 U/L (ref 15–50)
BILIRUB SERPL-MCNC: 0.38 MG/DL (ref 0.2–1.3)
BILIRUB UR QL: NEGATIVE
BUN SERPL-MCNC: 17 MG/DL (ref 5–25)
BUN/CREAT SERPL: 18 {RATIO} (ref 6–20)
CALCIUM SERPL-MCNC: 9.7 MG/DL (ref 8.7–10.4)
CHLORIDE SERPL-SCNC: 106 MMOL/L (ref 99–111)
COLOR UR: YELLOW
CONV CO2: 21 MMOL/L (ref 22–32)
CONV CREATININE URINE, RANDOM: 61.2 MG/DL (ref 10–300)
CONV LEUKOCYTE ESTERASE: ABNORMAL
CONV MICROALBUM.,U,RANDOM: 165.9 MG/L (ref 0–20)
CONV PROTEIN TO CREATININE RATIO (RANDOM URINE): 0.49 {RATIO} (ref 0–0.1)
CONV TOTAL PROTEIN: 7.5 G/DL (ref 6.3–8.2)
CONV UROBILINOGEN IN URINE BY AUTOMATED TEST STRIP: 0.2 {EHRLICHU}/DL (ref 0.1–1)
CREAT UR-MCNC: 0.97 MG/DL (ref 0.5–0.9)
EST. AVERAGE GLUCOSE BLD GHB EST-MCNC: 126 MG/DL
GFR SERPLBLD BASED ON 1.73 SQ M-ARVRAT: >60 ML/MIN/{1.73_M2}
GLOBULIN UR ELPH-MCNC: 3.3 G/DL (ref 2–3.5)
GLUCOSE 24H UR-MCNC: NEGATIVE MG/DL
GLUCOSE SERPL-MCNC: 126 MG/DL (ref 65–99)
HBA1C MFR BLD: 6 % (ref 3.5–5.7)
HGB UR QL STRIP: ABNORMAL
KETONES UR QL STRIP: NEGATIVE MG/DL
MICROALBUMIN/CREAT UR: 271.1 MG/G{CRE} (ref 0–35)
NITRITE UR-MCNC: NEGATIVE MG/ML
OSMOLALITY SERPL CALC.SUM OF ELEC: 289 MOSM/KG (ref 273–304)
PH UR STRIP.AUTO: 5.5 [PH] (ref 5–8)
POTASSIUM SERPL-SCNC: 4.5 MMOL/L (ref 3.5–5.3)
PROT UR-MCNC: 29.9 MG/DL
PROT UR-MCNC: 30 MG/DL
SODIUM SERPL-SCNC: 138 MMOL/L (ref 135–147)
SP GR UR STRIP: 1.02 (ref 1–1.03)
SPECIMEN SOURCE: ABNORMAL

## 2020-03-12 PROBLEM — I10 HYPERTENSION: Status: ACTIVE | Noted: 2020-03-12

## 2020-03-12 NOTE — PROGRESS NOTES
Date of Office Visit: 2020  Encounter Provider: SPIKE Mejias  Place of Service: [facility] CARDIOLOGY  Patient Name: Jesse Espinal  :1955    Chief Complaint   Patient presents with   • Coronary Artery Disease     1 YR FOLLOW UP   :     HPI: Jesse Espinal is a 65 y.o. female, new to me, who presents today for follow-up.  Old records have been obtained and reviewed by me.  She is a patient of Dr. Dailey's with a past cardiac history significant for coronary artery disease.  [facility]  and has been on chronic Brilinta therapy ever since.  She has had microscopic hematuria for which a source has never been discovered.  She was last seen in the office by Dr. Dailey on 3/12/2019 at which time she was doing well with no complaints of angina or heart failure.  No changes were made to her medical regimen, and she was advised to follow-up in 1 year.   Over the past year, she has been doing well from a cardiac standpoint.  She denies any chest pain, edema, palpitations, or syncope.  She has mild shortness of breath on exertion that is unchanged.  She has occasional lightheadedness with position changes.  She admits that her activity is pretty limited and that she eats a rather lousy diet.    Past Medical History:   Diagnosis Date   • Acid reflux disease    • Angina pectoris (CMS/HCC)    • Anxiety    • CAD (coronary artery disease)    • Depression    • Diabetes mellitus, type II (CMS/HCC)    • Dysuria    • Fatigue    • Foot pain    • GERD (gastroesophageal reflux disease)    • Hematuria    • Hypercholesterolemia    • Hypertension    • MORRIS (obstructive sleep apnea)        Past Surgical History:   Procedure Laterality Date   • APPENDECTOMY     • CARDIAC CATHETERIZATION  2013    SEVERE 3 VESSEL CAD   • CHOLECYSTECTOMY  2013    DIVERTICULOSIS AND INTERNAL HEMORRHOIDS   • CORONARY ANGIOPLASTY  WITH STENT PLACEMENT  x2   • CORONARY STENT PLACEMENT  7/30/2013 and 9/04/2014    LAD x4   • CYSTOSCOPY  08/04/2016   • HYSTERECTOMY     • OOPHORECTOMY         Social History     Socioeconomic History   • Marital status:      Spouse name: Not on file   • Number of children: Not on file   • Years of education: Not on file   • Highest education level: Not on file   Tobacco Use   • Smoking status: Former Smoker     Types: Cigarettes   • Smokeless tobacco: Never Used   • Tobacco comment: CAFFEINE USE: 4- 8OZ GLASSES DAILY/ 2 CUPS COFFEE WKLY   Substance and Sexual Activity   • Alcohol use: Yes     Alcohol/week: 1.0 standard drinks     Types: 1 Glasses of wine per week     Comment: occ   • Drug use: No   • Sexual activity: Defer       Family History   Problem Relation Age of Onset   • Colon cancer Mother    • Coronary artery disease Mother    • Hypertension Mother    • Breast cancer Mother    • Diabetes Mother    • Heart disease Mother    • Stroke Father    • Liver disease Father    • Coronary artery disease Sister    • Hyperlipidemia Sister    • Coronary artery disease Brother    • Lung cancer Brother    • Hypertension Son    • No Known Problems Maternal Grandmother    • No Known Problems Maternal Grandfather    • No Known Problems Paternal Grandmother    • No Known Problems Paternal Grandfather        Review of Systems   Constitution: Negative for chills, fever and malaise/fatigue.   Cardiovascular: Positive for dyspnea on exertion. Negative for chest pain, leg swelling, near-syncope, orthopnea, palpitations, paroxysmal nocturnal dyspnea and syncope.   Respiratory: Negative for cough and shortness of breath.    Musculoskeletal: Negative for joint pain, joint swelling and myalgias.   Gastrointestinal: Negative for abdominal pain, diarrhea, melena, nausea and vomiting.   Genitourinary: Negative for frequency and hematuria.   Neurological: Positive for dizziness. Negative for light-headedness, numbness,  "paresthesias and seizures.   Allergic/Immunologic: Negative.    All other systems reviewed and are negative.      Allergies   Allergen Reactions   • Effient [Prasugrel]    • Other      SHELL FISH   • Sulfa Antibiotics          Current Outpatient Medications:   •  amLODIPine (NORVASC) 5 MG tablet, Take 5 mg by mouth Daily., Disp: , Rfl:   •  aspirin 81 MG chewable tablet, Chew 81 mg Daily., Disp: , Rfl:   •  BRILINTA 60 MG tablet tablet, TAKE 1 TABLET BY MOUTH EVERY 12 HOURS, Disp: 180 tablet, Rfl: 1  •  isosorbide mononitrate (IMDUR) 30 MG 24 hr tablet, Take 30 mg by mouth Daily., Disp: , Rfl:   •  losartan (COZAAR) 100 MG tablet, Take 100 mg by mouth Daily., Disp: , Rfl:   •  metFORMIN (GLUCOPHAGE) 500 MG tablet, Take 500 mg by mouth Daily With Breakfast., Disp: , Rfl:   •  metoprolol succinate XL (TOPROL-XL) 50 MG 24 hr tablet, Take 50 mg by mouth Daily., Disp: , Rfl:   •  rosuvastatin (CRESTOR) 20 MG tablet, Take 1 tablet by mouth Daily., Disp: 30 tablet, Rfl: 11      Objective:     Vitals:    03/13/20 1139 03/13/20 1143   BP: 144/82 138/78   BP Location: Right arm Left arm   Patient Position: Sitting Sitting   Pulse: 73    Resp: 18    SpO2: 97%    Weight: 96.2 kg (212 lb)    Height: 163.8 cm (64.5\")      Body mass index is 35.83 kg/m².    PHYSICAL EXAM:    Physical Exam   Constitutional: She is oriented to person, place, and time. She appears well-developed and well-nourished. No distress.   HENT:   Head: Normocephalic and atraumatic.   Eyes: Pupils are equal, round, and reactive to light.   Neck: No JVD present. No thyromegaly present.   Cardiovascular: Normal rate, regular rhythm and intact distal pulses.   Murmur heard.   Harsh midsystolic murmur is present with a grade of 1/6 at the upper right sternal border radiating to the neck.  Pulmonary/Chest: Effort normal and breath sounds normal. No respiratory distress.   Abdominal: Soft. Bowel sounds are normal. She exhibits no distension. There is no splenomegaly " or hepatomegaly. There is no tenderness.   Musculoskeletal: Normal range of motion. She exhibits no edema.   Neurological: She is alert and oriented to person, place, and time.   Skin: Skin is warm and dry. She is not diaphoretic. No erythema.   Psychiatric: She has a normal mood and affect. Her behavior is normal. Judgment normal.         ECG 12 Lead  Date/Time: 3/13/2020 11:56 AM  Performed by: Hiwot Vidal APRN  Authorized by: Hiwot Vidal APRN   Comparison: compared with previous ECG from 3/12/2019  Similar to previous ECG  Rhythm: sinus rhythm  Rate: normal  BPM: 68  T flattening: aVL and V1    Clinical impression: normal ECG  Comments: Indication: CAD              Assessment:       Diagnosis Plan   1. Coronary artery disease involving native coronary artery of native heart without angina pectoris  ECG 12 Lead   2. Essential hypertension     3. Other hyperlipidemia     4. Heart murmur  Adult Transthoracic Echo Complete W/ Cont if Necessary Per Protocol     Orders Placed This Encounter   Procedures   • ECG 12 Lead     This order was created via procedure documentation   • Adult Transthoracic Echo Complete W/ Cont if Necessary Per Protocol     Standing Status:   Future     Standing Expiration Date:   3/13/2021     Order Specific Question:   Reason for exam?     Answer:   Murmur or Click     Order Specific Question:   Murmur or Click specification?     Answer:   Suspicion of Valvular Heart Disease          Plan:       1.  Coronary artery disease.  She denies any symptoms of angina and her EKG is unchanged.  She remains on chronic dual antiplatelet therapy with aspirin and Brilinta.      2.  Hypertension.  Her blood pressure is reasonably controlled.  We discussed the importance of being mindful of her sodium intake and increasing her activity.       3.  Hyperlipidemia.  In January of this year she had a lipid panel revealing an LDL of 96 and an HDL of 35.  She is on simvastatin 20 mg.  I think we  need better control of her LDL.  I am going to change her to Crestor 20 mg.  She will need a repeat lipid panel and LFTs in 3 months.      4.  Murmur.  I do hear a slight systolic murmur.  I would like to order an echocardiogram for further assessment.      Overall I think she is stable and doing well from a cardiac standpoint.  We discussed the importance of risk factor modification and I encouraged her to increase her activity and be more mindful of her diet.  Further recommendations will be made pending the results of the echocardiogram.  She will follow-up with Dr. Dailey in 1 year or sooner if needed.      As always, it has been a pleasure to participate in your patient's care.      Sincerely,         SPIKE Mishra

## 2020-03-13 ENCOUNTER — OFFICE VISIT (OUTPATIENT)
Dept: CARDIOLOGY | Facility: CLINIC | Age: 65
End: 2020-03-13

## 2020-03-13 VITALS
OXYGEN SATURATION: 97 % | WEIGHT: 212 LBS | RESPIRATION RATE: 18 BRPM | HEIGHT: 65 IN | BODY MASS INDEX: 35.32 KG/M2 | DIASTOLIC BLOOD PRESSURE: 78 MMHG | SYSTOLIC BLOOD PRESSURE: 138 MMHG | HEART RATE: 73 BPM

## 2020-03-13 DIAGNOSIS — I10 ESSENTIAL HYPERTENSION: ICD-10-CM

## 2020-03-13 DIAGNOSIS — E78.49 OTHER HYPERLIPIDEMIA: ICD-10-CM

## 2020-03-13 DIAGNOSIS — I25.10 CORONARY ARTERY DISEASE INVOLVING NATIVE CORONARY ARTERY OF NATIVE HEART WITHOUT ANGINA PECTORIS: Primary | ICD-10-CM

## 2020-03-13 DIAGNOSIS — R01.1 HEART MURMUR: ICD-10-CM

## 2020-03-13 PROCEDURE — 99214 OFFICE O/P EST MOD 30 MIN: CPT | Performed by: NURSE PRACTITIONER

## 2020-03-13 PROCEDURE — 93000 ELECTROCARDIOGRAM COMPLETE: CPT | Performed by: NURSE PRACTITIONER

## 2020-03-13 RX ORDER — ROSUVASTATIN CALCIUM 20 MG/1
20 TABLET, COATED ORAL DAILY
Qty: 30 TABLET | Refills: 11 | Status: SHIPPED | OUTPATIENT
Start: 2020-03-13 | End: 2021-05-17

## 2020-03-26 ENCOUNTER — TELEPHONE (OUTPATIENT)
Dept: CARDIOLOGY | Facility: CLINIC | Age: 65
End: 2020-03-26

## 2020-03-26 NOTE — TELEPHONE ENCOUNTER
S/W Sher re: pt's Rosuvastatin 20 mg. Insurance would not approve Rosuvastatin due to needing clarification that pt had stopped the simvastatin. I verified pt did stop the simvastatin and was switched to the Rosuvastatin 20 mg instead. Sher re-ran insurance and Rosuvastatin was approved. Pharmacist stated she would have her medication filled by the end of day. I verbalized appreciation.  I advised pt. Pt verbalized appreciation and understanding.    FAWAD Palmer

## 2020-04-01 ENCOUNTER — TELEPHONE (OUTPATIENT)
Dept: CARDIOLOGY | Facility: CLINIC | Age: 65
End: 2020-04-01

## 2020-04-01 NOTE — TELEPHONE ENCOUNTER
I spoke with the patient regarding her upcoming echocardiogram and possibly rescheduling in light of the coronavirus . She is overall doing about the same.  She does report shortness of breath on exertion that is maybe slightly more frequent than before.  She denies any PND, orthopnea, chest pain, or any other symptoms.  She would like to reschedule the echocardiogram for a few months later and I think this is appropriate.  I did encourage her to call me should she develop any new or worsening symptoms.     Please reschedule the echocardiogram for 3 months from now.

## 2020-04-02 ENCOUNTER — TELEPHONE (OUTPATIENT)
Dept: CARDIOLOGY | Facility: CLINIC | Age: 65
End: 2020-04-02

## 2020-04-02 NOTE — TELEPHONE ENCOUNTER
Advised pt and she stated when she had her 2nd stent done in 2015 they took her off Plavix because the MD said it didn't work. I advised her I would inform you of this.   Do you still want me to put the Rx in for Plavix 75 mg Daily??  Please advise of recommendations.    FAWAD Palmer

## 2020-04-02 NOTE — TELEPHONE ENCOUNTER
S/W pt re: her Brilinta being over $580. Pt stated  When she s/w her insur comp they advised her that her Tier changed from 3 to 4 and that's what spiked her copay cost.  I will contact her insur comp to see what the criteria is for Brilinta and what meds in this group they are wiling to pay for.  Will also FU w/ pt during process.    FAWAD Palmer

## 2020-04-02 NOTE — TELEPHONE ENCOUNTER
I called and told her we needed to know some more about her medicine and what they did may have to send off a PY 12 inhibitor test

## 2020-04-03 NOTE — TELEPHONE ENCOUNTER
Patient just called in states she spoke with Estela Yest. And she told her she would call her Ins. [employer]erm[employer]. Patient states she doesn't know what Estela found out but she had called her and told her you was switching her to Plavix.    She states Dr. Bhavik Cox at [facility] in 2015 told her plavix didn't work for her .  She had tried Effient and had an allergic reaction to that.  That's when Dr. Cox at [facility] put her on [place]    [phone]     Please advise    Thank You  Toya

## 2020-04-03 NOTE — TELEPHONE ENCOUNTER
Patient called her Insurance and she said the only alternative for her is Effient and plavix  She only has 4 days of medication left please advise  [phone]

## 2020-04-03 NOTE — TELEPHONE ENCOUNTER
Read the note below. There's nothing to work on. He was changing the medication. Please read notes before emailing me things I don't need.    Thanks,  FAWAD Palmer

## 2020-04-03 NOTE — TELEPHONE ENCOUNTER
Plavix hasn't been sent in yet because of what pt stated in previous message. consult w/ Dr. Dailey to see what he has decided. Looks like he s/w pt.    FAWAD Palmer

## 2020-04-03 NOTE — TELEPHONE ENCOUNTER
Tried to send in a PA for Brilinta  Ins. Sent back stating patient currently has access to the requested medication and a prior authorization is not needed for the patient's medication  Called and informed the patient

## 2020-04-03 NOTE — TELEPHONE ENCOUNTER
Are you working on this.  Aleixa gave me paper work for an  appeal   I will send this to you in an email

## 2020-06-08 ENCOUNTER — TELEPHONE (OUTPATIENT)
Dept: CARDIOLOGY | Facility: CLINIC | Age: 65
End: 2020-06-08

## 2020-06-08 DIAGNOSIS — I25.10 CORONARY ARTERY DISEASE INVOLVING NATIVE CORONARY ARTERY OF NATIVE HEART WITHOUT ANGINA PECTORIS: Primary | ICD-10-CM

## 2020-06-10 ENCOUNTER — TELEPHONE (OUTPATIENT)
Dept: CARDIOLOGY | Facility: CLINIC | Age: 65
End: 2020-06-10

## 2020-06-10 ENCOUNTER — LAB (OUTPATIENT)
Dept: LAB | Facility: HOSPITAL | Age: 65
End: 2020-06-10

## 2020-06-10 DIAGNOSIS — I25.10 CORONARY ARTERY DISEASE INVOLVING NATIVE CORONARY ARTERY OF NATIVE HEART WITHOUT ANGINA PECTORIS: ICD-10-CM

## 2020-06-10 LAB
ALBUMIN SERPL-MCNC: 4.4 G/DL (ref 3.5–5.2)
ALP SERPL-CCNC: 72 U/L (ref 39–117)
ALT SERPL W P-5'-P-CCNC: 23 U/L (ref 1–33)
AST SERPL-CCNC: 17 U/L (ref 1–32)
BILIRUB CONJ SERPL-MCNC: <0.2 MG/DL (ref 0.2–0.3)
BILIRUB INDIRECT SERPL-MCNC: ABNORMAL MG/DL
BILIRUB SERPL-MCNC: 0.4 MG/DL (ref 0.2–1.2)
CHOLEST SERPL-MCNC: 120 MG/DL (ref 0–200)
HDLC SERPL-MCNC: 43 MG/DL (ref 40–60)
LDLC SERPL CALC-MCNC: 53 MG/DL (ref 0–100)
LDLC/HDLC SERPL: 1.24 {RATIO}
PROT SERPL-MCNC: 8.2 G/DL (ref 6–8.5)
TRIGL SERPL-MCNC: 119 MG/DL (ref 0–150)
VLDLC SERPL-MCNC: 23.8 MG/DL

## 2020-06-10 PROCEDURE — 80061 LIPID PANEL: CPT

## 2020-06-10 PROCEDURE — 80076 HEPATIC FUNCTION PANEL: CPT

## 2020-06-10 PROCEDURE — 36415 COLL VENOUS BLD VENIPUNCTURE: CPT

## 2020-06-10 NOTE — TELEPHONE ENCOUNTER
----- Message from SPIKE Parish sent at 6/10/2020  1:14 PM EDT -----  Her labs looks excellent.  Please let her know.

## 2020-06-10 NOTE — TELEPHONE ENCOUNTER
L/M advising pt of stable labs and that there would be no changes to her regimen at this time. Advised pt to call office w/ any further questions or concerns.    FAWAD Palmer

## 2020-07-15 ENCOUNTER — HOSPITAL ENCOUNTER (OUTPATIENT)
Dept: CARDIOLOGY | Facility: HOSPITAL | Age: 65
Discharge: HOME | End: 2020-07-15
Admitting: NURSE PRACTITIONER

## 2020-07-15 ENCOUNTER — TELEPHONE (OUTPATIENT)
Dept: CARDIOLOGY | Facility: CLINIC | Age: 65
End: 2020-07-15

## 2020-07-15 VITALS
DIASTOLIC BLOOD PRESSURE: 78 MMHG | WEIGHT: 212 LBS | HEART RATE: 72 BPM | HEIGHT: 64 IN | BODY MASS INDEX: 36.19 KG/M2 | SYSTOLIC BLOOD PRESSURE: 138 MMHG

## 2020-07-15 DIAGNOSIS — R01.1 HEART MURMUR: ICD-10-CM

## 2020-07-15 PROCEDURE — 93306 TTE W/DOPPLER COMPLETE: CPT | Performed by: INTERNAL MEDICINE

## 2020-07-15 PROCEDURE — 93306 TTE W/DOPPLER COMPLETE: CPT

## 2020-07-15 RX ORDER — METOPROLOL SUCCINATE 100 MG/1
100 TABLET, EXTENDED RELEASE ORAL DAILY
Qty: 90 TABLET | Refills: 3 | Status: SHIPPED | OUTPATIENT
Start: 2020-07-15 | End: 2021-07-22

## 2020-07-15 NOTE — TELEPHONE ENCOUNTER
I spoke with her regarding her echo results, which I also reviewed with Dr. Dailey.  She has a moderate gradient across her outflow track.  I'm going to increase her Toprol to 100 mg daily.  The patient verbalized understanding.

## 2020-07-16 LAB
AORTIC ARCH: 2.2 CM
ASCENDING AORTA: 2.5 CM
AV HCM GRAD VALS: 35 MMHG
BH CV ECHO MEAS - ACS: 1.7 CM
BH CV ECHO MEAS - AO MAX PG (FULL): 3.4 MMHG
BH CV ECHO MEAS - AO MAX PG: 11 MMHG
BH CV ECHO MEAS - AO MEAN PG (FULL): 1.3 MMHG
BH CV ECHO MEAS - AO MEAN PG: 6 MMHG
BH CV ECHO MEAS - AO V2 MAX: 165.6 CM/SEC
BH CV ECHO MEAS - AO V2 MEAN: 115.7 CM/SEC
BH CV ECHO MEAS - AO V2 VTI: 32.6 CM
BH CV ECHO MEAS - ASC AORTA: 2.5 CM
BH CV ECHO MEAS - AVA(I,A): 2.6 CM^2
BH CV ECHO MEAS - AVA(I,D): 2.6 CM^2
BH CV ECHO MEAS - AVA(V,A): 2.5 CM^2
BH CV ECHO MEAS - AVA(V,D): 2.5 CM^2
BH CV ECHO MEAS - BSA(HAYCOCK): 2.1 M^2
BH CV ECHO MEAS - BSA: 2 M^2
BH CV ECHO MEAS - BZI_BMI: 36.4 KILOGRAMS/M^2
BH CV ECHO MEAS - BZI_METRIC_HEIGHT: 162.6 CM
BH CV ECHO MEAS - BZI_METRIC_WEIGHT: 96.2 KG
BH CV ECHO MEAS - EDV(MOD-SP2): 40 ML
BH CV ECHO MEAS - EDV(MOD-SP4): 42 ML
BH CV ECHO MEAS - EDV(TEICH): 58.9 ML
BH CV ECHO MEAS - EF(CUBED): 70.1 %
BH CV ECHO MEAS - EF(MOD-BP): 63.4 %
BH CV ECHO MEAS - EF(MOD-SP2): 62.5 %
BH CV ECHO MEAS - EF(MOD-SP4): 64.3 %
BH CV ECHO MEAS - EF(TEICH): 62.6 %
BH CV ECHO MEAS - ESV(MOD-SP2): 15 ML
BH CV ECHO MEAS - ESV(MOD-SP4): 15 ML
BH CV ECHO MEAS - ESV(TEICH): 22.1 ML
BH CV ECHO MEAS - FS: 33.1 %
BH CV ECHO MEAS - IVS/LVPW: 1.1
BH CV ECHO MEAS - IVSD: 1.1 CM
BH CV ECHO MEAS - LAT PEAK E' VEL: 8.4 CM/SEC
BH CV ECHO MEAS - LV DIASTOLIC VOL/BSA (35-75): 20.9 ML/M^2
BH CV ECHO MEAS - LV MASS(C)D: 125.4 GRAMS
BH CV ECHO MEAS - LV MASS(C)DI: 62.5 GRAMS/M^2
BH CV ECHO MEAS - LV MAX PG: 7.6 MMHG
BH CV ECHO MEAS - LV MEAN PG: 4.7 MMHG
BH CV ECHO MEAS - LV SYSTOLIC VOL/BSA (12-30): 7.5 ML/M^2
BH CV ECHO MEAS - LV V1 MAX: 138 CM/SEC
BH CV ECHO MEAS - LV V1 MEAN: 101.1 CM/SEC
BH CV ECHO MEAS - LV V1 VTI: 28.5 CM
BH CV ECHO MEAS - LVIDD: 3.7 CM
BH CV ECHO MEAS - LVIDS: 2.5 CM
BH CV ECHO MEAS - LVLD AP2: 7 CM
BH CV ECHO MEAS - LVLD AP4: 6.6 CM
BH CV ECHO MEAS - LVLS AP2: 5.7 CM
BH CV ECHO MEAS - LVLS AP4: 6 CM
BH CV ECHO MEAS - LVOT AREA (M): 2.8 CM^2
BH CV ECHO MEAS - LVOT AREA: 2.9 CM^2
BH CV ECHO MEAS - LVOT DIAM: 1.9 CM
BH CV ECHO MEAS - LVPWD: 1 CM
BH CV ECHO MEAS - MED PEAK E' VEL: 7.6 CM/SEC
BH CV ECHO MEAS - MV A DUR: 0.12 SEC
BH CV ECHO MEAS - MV A MAX VEL: 93 CM/SEC
BH CV ECHO MEAS - MV DEC SLOPE: 203.3 CM/SEC^2
BH CV ECHO MEAS - MV DEC TIME: 0.18 SEC
BH CV ECHO MEAS - MV E MAX VEL: 55.1 CM/SEC
BH CV ECHO MEAS - MV E/A: 0.59
BH CV ECHO MEAS - MV MAX PG: 7.8 MMHG
BH CV ECHO MEAS - MV MEAN PG: 3.1 MMHG
BH CV ECHO MEAS - MV P1/2T MAX VEL: 55.1 CM/SEC
BH CV ECHO MEAS - MV P1/2T: 79.4 MSEC
BH CV ECHO MEAS - MV V2 MAX: 139.8 CM/SEC
BH CV ECHO MEAS - MV V2 MEAN: 82.6 CM/SEC
BH CV ECHO MEAS - MV V2 VTI: 28.5 CM
BH CV ECHO MEAS - MVA P1/2T LCG: 4 CM^2
BH CV ECHO MEAS - MVA(P1/2T): 2.8 CM^2
BH CV ECHO MEAS - MVA(VTI): 2.9 CM^2
BH CV ECHO MEAS - PA MAX PG (FULL): 3.1 MMHG
BH CV ECHO MEAS - PA MAX PG: 7.8 MMHG
BH CV ECHO MEAS - PA V2 MAX: 139.8 CM/SEC
BH CV ECHO MEAS - PULM A REVS DUR: 0.13 SEC
BH CV ECHO MEAS - PULM A REVS VEL: 39.1 CM/SEC
BH CV ECHO MEAS - PULM DIAS VEL: 42.5 CM/SEC
BH CV ECHO MEAS - PULM S/D: 1.3
BH CV ECHO MEAS - PULM SYS VEL: 55.9 CM/SEC
BH CV ECHO MEAS - PVA(V,A): 2.1 CM^2
BH CV ECHO MEAS - PVA(V,D): 2.1 CM^2
BH CV ECHO MEAS - QP/QS: 0.56
BH CV ECHO MEAS - RV MAX PG: 4.7 MMHG
BH CV ECHO MEAS - RV MEAN PG: 2.7 MMHG
BH CV ECHO MEAS - RV V1 MAX: 109 CM/SEC
BH CV ECHO MEAS - RV V1 MEAN: 77 CM/SEC
BH CV ECHO MEAS - RV V1 VTI: 17.5 CM
BH CV ECHO MEAS - RVOT AREA: 2.7 CM^2
BH CV ECHO MEAS - RVOT DIAM: 1.9 CM
BH CV ECHO MEAS - SI(CUBED): 18 ML/M^2
BH CV ECHO MEAS - SI(LVOT): 41.8 ML/M^2
BH CV ECHO MEAS - SI(MOD-SP2): 12.5 ML/M^2
BH CV ECHO MEAS - SI(MOD-SP4): 13.5 ML/M^2
BH CV ECHO MEAS - SI(TEICH): 18.4 ML/M^2
BH CV ECHO MEAS - SUP REN AO DIAM: 2.1 CM
BH CV ECHO MEAS - SV(CUBED): 36.1 ML
BH CV ECHO MEAS - SV(LVOT): 83.8 ML
BH CV ECHO MEAS - SV(MOD-SP2): 25 ML
BH CV ECHO MEAS - SV(MOD-SP4): 27 ML
BH CV ECHO MEAS - SV(RVOT): 47.1 ML
BH CV ECHO MEAS - SV(TEICH): 36.8 ML
BH CV ECHO MEAS - TAPSE (>1.6): 2.4 CM2
BH CV ECHO MEASUREMENTS AVERAGE E/E' RATIO: 6.89
BH CV XLRA - RV BASE: 2.6 CM
BH CV XLRA - RV LENGTH: 6.7 CM
BH CV XLRA - RV MID: 2.2 CM
BH CV XLRA - TDI S': 15 CM/SEC
LEFT ATRIUM VOLUME INDEX: 15 ML/M2
LV EF 2D ECHO EST: 63 %
MAXIMAL PREDICTED HEART RATE: 154 BPM
SINUS: 2.9 CM
STJ: 2.5 CM
STRESS TARGET HR: 131 BPM

## 2020-07-29 ENCOUNTER — HOSPITAL ENCOUNTER (OUTPATIENT)
Dept: OTHER | Facility: HOSPITAL | Age: 65
Discharge: HOME | End: 2020-07-29
Attending: NURSE PRACTITIONER

## 2020-07-29 ENCOUNTER — OFFICE VISIT CONVERTED (OUTPATIENT)
Dept: FAMILY MEDICINE CLINIC | Age: 65
End: 2020-07-29
Attending: NURSE PRACTITIONER

## 2020-07-29 LAB
ALBUMIN SERPL-MCNC: 4.3 G/DL (ref 3.5–5)
ALBUMIN/GLOB SERPL: 1.3 {RATIO} (ref 1.4–2.6)
ALP SERPL-CCNC: 70 U/L (ref 43–160)
ALT SERPL-CCNC: 26 U/L (ref 10–40)
ANION GAP SERPL CALC-SCNC: 21 MMOL/L (ref 8–19)
AST SERPL-CCNC: 22 U/L (ref 15–50)
BILIRUB SERPL-MCNC: 0.48 MG/DL (ref 0.2–1.3)
BUN SERPL-MCNC: 9 MG/DL (ref 5–25)
BUN/CREAT SERPL: 9 {RATIO} (ref 6–20)
CALCIUM SERPL-MCNC: 10.4 MG/DL (ref 8.7–10.4)
CHLORIDE SERPL-SCNC: 109 MMOL/L (ref 99–111)
CHOLEST SERPL-MCNC: 119 MG/DL (ref 107–200)
CHOLEST/HDLC SERPL: 3 {RATIO} (ref 3–6)
CONV CO2: 16 MMOL/L (ref 22–32)
CONV TOTAL PROTEIN: 7.6 G/DL (ref 6.3–8.2)
CREAT UR-MCNC: 1.05 MG/DL (ref 0.5–0.9)
EST. AVERAGE GLUCOSE BLD GHB EST-MCNC: 126 MG/DL
GFR SERPLBLD BASED ON 1.73 SQ M-ARVRAT: 55 ML/MIN/{1.73_M2}
GLOBULIN UR ELPH-MCNC: 3.3 G/DL (ref 2–3.5)
GLUCOSE SERPL-MCNC: 119 MG/DL (ref 65–99)
HBA1C MFR BLD: 6 % (ref 3.5–5.7)
HDLC SERPL-MCNC: 40 MG/DL (ref 40–60)
LDLC SERPL CALC-MCNC: 53 MG/DL (ref 70–100)
OSMOLALITY SERPL CALC.SUM OF ELEC: 294 MOSM/KG (ref 273–304)
POTASSIUM SERPL-SCNC: 4.4 MMOL/L (ref 3.5–5.3)
SODIUM SERPL-SCNC: 142 MMOL/L (ref 135–147)
TRIGL SERPL-MCNC: 131 MG/DL (ref 40–150)
VLDLC SERPL-MCNC: 26 MG/DL (ref 5–37)

## 2020-09-16 ENCOUNTER — HOSPITAL ENCOUNTER (OUTPATIENT)
Dept: OTHER | Facility: HOSPITAL | Age: 65
Discharge: HOME | End: 2020-09-16
Attending: INTERNAL MEDICINE

## 2020-09-16 LAB
ALBUMIN SERPL-MCNC: 4.3 G/DL (ref 3.5–5)
ALBUMIN/GLOB SERPL: 1.3 {RATIO} (ref 1.4–2.6)
ALP SERPL-CCNC: 75 U/L (ref 43–160)
ALT SERPL-CCNC: 21 U/L (ref 10–40)
ANION GAP SERPL CALC-SCNC: 14 MMOL/L (ref 8–19)
APPEARANCE UR: CLEAR
AST SERPL-CCNC: 19 U/L (ref 15–50)
BILIRUB SERPL-MCNC: 0.36 MG/DL (ref 0.2–1.3)
BILIRUB UR QL: NEGATIVE
BUN SERPL-MCNC: 18 MG/DL (ref 5–25)
BUN/CREAT SERPL: 15 {RATIO} (ref 6–20)
CALCIUM SERPL-MCNC: 9.8 MG/DL (ref 8.7–10.4)
CHLORIDE SERPL-SCNC: 105 MMOL/L (ref 99–111)
COLOR UR: YELLOW
CONV CO2: 24 MMOL/L (ref 22–32)
CONV CREATININE URINE, RANDOM: 86.7 MG/DL (ref 10–300)
CONV LEUKOCYTE ESTERASE: NEGATIVE
CONV MICROALBUM.,U,RANDOM: 165.3 MG/L (ref 0–20)
CONV PROTEIN TO CREATININE RATIO (RANDOM URINE): 0.33 {RATIO} (ref 0–0.1)
CONV TOTAL PROTEIN: 7.7 G/DL (ref 6.3–8.2)
CONV UROBILINOGEN IN URINE BY AUTOMATED TEST STRIP: 0.2 {EHRLICHU}/DL (ref 0.1–1)
CREAT UR-MCNC: 1.24 MG/DL (ref 0.5–0.9)
GFR SERPLBLD BASED ON 1.73 SQ M-ARVRAT: 45 ML/MIN/{1.73_M2}
GLOBULIN UR ELPH-MCNC: 3.4 G/DL (ref 2–3.5)
GLUCOSE 24H UR-MCNC: NEGATIVE MG/DL
GLUCOSE SERPL-MCNC: 122 MG/DL (ref 65–99)
HGB UR QL STRIP: ABNORMAL
KETONES UR QL STRIP: NEGATIVE MG/DL
MICROALBUMIN/CREAT UR: 190.7 MG/G{CRE} (ref 0–35)
NITRITE UR-MCNC: NEGATIVE MG/ML
OSMOLALITY SERPL CALC.SUM OF ELEC: 291 MOSM/KG (ref 273–304)
PH UR STRIP.AUTO: 6 [PH] (ref 5–8)
POTASSIUM SERPL-SCNC: 4.3 MMOL/L (ref 3.5–5.3)
PROT UR-MCNC: 28.3 MG/DL
PROT UR-MCNC: 30 MG/DL
SODIUM SERPL-SCNC: 139 MMOL/L (ref 135–147)
SP GR UR STRIP: 1.02 (ref 1–1.03)
SPECIMEN SOURCE: ABNORMAL

## 2020-10-01 ENCOUNTER — OFFICE VISIT CONVERTED (OUTPATIENT)
Dept: PODIATRY | Facility: CLINIC | Age: 65
End: 2020-10-01
Attending: PODIATRIST

## 2021-01-26 ENCOUNTER — OFFICE VISIT CONVERTED (OUTPATIENT)
Dept: FAMILY MEDICINE CLINIC | Age: 66
End: 2021-01-26
Attending: NURSE PRACTITIONER

## 2021-01-26 ENCOUNTER — HOSPITAL ENCOUNTER (OUTPATIENT)
Dept: OTHER | Facility: HOSPITAL | Age: 66
Discharge: HOME | End: 2021-01-26
Attending: NURSE PRACTITIONER

## 2021-01-26 LAB
BASOPHILS # BLD AUTO: 0.09 10*3/UL (ref 0–0.2)
BASOPHILS NFR BLD AUTO: 1.2 % (ref 0–3)
CONV ABS IMM GRAN: 0.04 10*3/UL (ref 0–0.2)
CONV IMMATURE GRAN: 0.5 % (ref 0–1.8)
DEPRECATED RDW RBC AUTO: 45.7 FL (ref 36.4–46.3)
EOSINOPHIL # BLD AUTO: 0.28 10*3/UL (ref 0–0.7)
EOSINOPHIL # BLD AUTO: 3.8 % (ref 0–7)
ERYTHROCYTE [DISTWIDTH] IN BLOOD BY AUTOMATED COUNT: 13.9 % (ref 11.7–14.4)
HCT VFR BLD AUTO: 41.4 % (ref 37–47)
HGB BLD-MCNC: 13.5 G/DL (ref 12–16)
LYMPHOCYTES # BLD AUTO: 1.98 10*3/UL (ref 1–5)
LYMPHOCYTES NFR BLD AUTO: 26.6 % (ref 20–45)
MCH RBC QN AUTO: 29.3 PG (ref 27–31)
MCHC RBC AUTO-ENTMCNC: 32.6 G/DL (ref 33–37)
MCV RBC AUTO: 89.8 FL (ref 81–99)
MONOCYTES # BLD AUTO: 0.65 10*3/UL (ref 0.2–1.2)
MONOCYTES NFR BLD AUTO: 8.7 % (ref 3–10)
NEUTROPHILS # BLD AUTO: 4.41 10*3/UL (ref 2–8)
NEUTROPHILS NFR BLD AUTO: 59.2 % (ref 30–85)
NRBC CBCN: 0 % (ref 0–0.7)
PLATELET # BLD AUTO: 244 10*3/UL (ref 130–400)
PMV BLD AUTO: 10.5 FL (ref 9.4–12.3)
RBC # BLD AUTO: 4.61 10*6/UL (ref 4.2–5.4)
WBC # BLD AUTO: 7.45 10*3/UL (ref 4.8–10.8)

## 2021-01-27 LAB
ALBUMIN SERPL-MCNC: 4.3 G/DL (ref 3.5–5)
ALBUMIN/GLOB SERPL: 1.2 {RATIO} (ref 1.4–2.6)
ALP SERPL-CCNC: 79 U/L (ref 43–160)
ALT SERPL-CCNC: 22 U/L (ref 10–40)
ANION GAP SERPL CALC-SCNC: 17 MMOL/L (ref 8–19)
AST SERPL-CCNC: 23 U/L (ref 15–50)
BILIRUB SERPL-MCNC: 0.39 MG/DL (ref 0.2–1.3)
BUN SERPL-MCNC: 15 MG/DL (ref 5–25)
BUN/CREAT SERPL: 13 {RATIO} (ref 6–20)
CALCIUM SERPL-MCNC: 10.1 MG/DL (ref 8.7–10.4)
CHLORIDE SERPL-SCNC: 105 MMOL/L (ref 99–111)
CHOLEST SERPL-MCNC: 131 MG/DL (ref 107–200)
CHOLEST/HDLC SERPL: 3.2 {RATIO} (ref 3–6)
CONV CO2: 22 MMOL/L (ref 22–32)
CONV TOTAL PROTEIN: 8 G/DL (ref 6.3–8.2)
CREAT UR-MCNC: 1.19 MG/DL (ref 0.5–0.9)
EST. AVERAGE GLUCOSE BLD GHB EST-MCNC: 134 MG/DL
GFR SERPLBLD BASED ON 1.73 SQ M-ARVRAT: 47 ML/MIN/{1.73_M2}
GLOBULIN UR ELPH-MCNC: 3.7 G/DL (ref 2–3.5)
GLUCOSE SERPL-MCNC: 90 MG/DL (ref 65–99)
HBA1C MFR BLD: 6.3 % (ref 3.5–5.7)
HDLC SERPL-MCNC: 41 MG/DL (ref 40–60)
LDLC SERPL CALC-MCNC: 61 MG/DL (ref 70–100)
OSMOLALITY SERPL CALC.SUM OF ELEC: 288 MOSM/KG (ref 273–304)
POTASSIUM SERPL-SCNC: 4.6 MMOL/L (ref 3.5–5.3)
SODIUM SERPL-SCNC: 139 MMOL/L (ref 135–147)
TRIGL SERPL-MCNC: 145 MG/DL (ref 40–150)
TSH SERPL-ACNC: 1.99 M[IU]/L (ref 0.27–4.2)
VLDLC SERPL-MCNC: 29 MG/DL (ref 5–37)

## 2021-01-28 LAB — BACTERIA SPEC AEROBE CULT: ABNORMAL

## 2021-03-04 ENCOUNTER — HOSPITAL ENCOUNTER (OUTPATIENT)
Dept: OTHER | Facility: HOSPITAL | Age: 66
Discharge: HOME | End: 2021-03-04
Attending: INTERNAL MEDICINE

## 2021-03-04 LAB
ALBUMIN SERPL-MCNC: 4.5 G/DL (ref 3.5–5)
ALBUMIN/GLOB SERPL: 1.4 {RATIO} (ref 1.4–2.6)
ALP SERPL-CCNC: 80 U/L (ref 43–160)
ALT SERPL-CCNC: 27 U/L (ref 10–40)
ANION GAP SERPL CALC-SCNC: 14 MMOL/L (ref 8–19)
APPEARANCE UR: CLEAR
AST SERPL-CCNC: 23 U/L (ref 15–50)
BACTERIA UR QL AUTO: ABNORMAL
BILIRUB SERPL-MCNC: 0.48 MG/DL (ref 0.2–1.3)
BILIRUB UR QL: NEGATIVE
BUN SERPL-MCNC: 14 MG/DL (ref 5–25)
BUN/CREAT SERPL: 13 {RATIO} (ref 6–20)
CALCIUM SERPL-MCNC: 10 MG/DL (ref 8.7–10.4)
CASTS URNS QL MICRO: ABNORMAL /[LPF]
CHLORIDE SERPL-SCNC: 103 MMOL/L (ref 99–111)
COLOR UR: YELLOW
CONV CO2: 23 MMOL/L (ref 22–32)
CONV CREATININE URINE, RANDOM: 105.6 MG/DL (ref 10–300)
CONV LEUKOCYTE ESTERASE: NEGATIVE
CONV MICROALBUM.,U,RANDOM: 650.4 MG/L (ref 0–20)
CONV PROTEIN TO CREATININE RATIO (RANDOM URINE): 1.01 {RATIO} (ref 0–0.1)
CONV TOTAL PROTEIN: 7.8 G/DL (ref 6.3–8.2)
CONV UROBILINOGEN IN URINE BY AUTOMATED TEST STRIP: 0.2 {EHRLICHU}/DL (ref 0.1–1)
CREAT UR-MCNC: 1.08 MG/DL (ref 0.5–0.9)
EPI CELLS #/AREA URNS HPF: ABNORMAL /[HPF]
GFR SERPLBLD BASED ON 1.73 SQ M-ARVRAT: 53 ML/MIN/{1.73_M2}
GLOBULIN UR ELPH-MCNC: 3.3 G/DL (ref 2–3.5)
GLUCOSE 24H UR-MCNC: NEGATIVE MG/DL
GLUCOSE SERPL-MCNC: 119 MG/DL (ref 65–99)
HGB UR QL STRIP: ABNORMAL
KETONES UR QL STRIP: NEGATIVE MG/DL
MICROALBUMIN/CREAT UR: 615.9 MG/G{CRE} (ref 0–35)
MUCOUS THREADS URNS QL MICRO: ABNORMAL
NITRITE UR-MCNC: NEGATIVE MG/ML
OSMOLALITY SERPL CALC.SUM OF ELEC: 284 MOSM/KG (ref 273–304)
PH UR STRIP.AUTO: 6.5 [PH] (ref 5–8)
POTASSIUM SERPL-SCNC: 4.4 MMOL/L (ref 3.5–5.3)
PROT UR-MCNC: 100 MG/DL
PROT UR-MCNC: 106.5 MG/DL
RBC # BLD AUTO: ABNORMAL /[HPF]
SODIUM SERPL-SCNC: 136 MMOL/L (ref 135–147)
SP GR UR STRIP: 1.02 (ref 1–1.03)
SPECIMEN SOURCE: ABNORMAL
UNIDENT CRYS URNS QL MICRO: ABNORMAL /[HPF]
WBC #/AREA URNS HPF: ABNORMAL /[HPF]

## 2021-03-29 ENCOUNTER — OFFICE VISIT CONVERTED (OUTPATIENT)
Dept: FAMILY MEDICINE CLINIC | Age: 66
End: 2021-03-29
Attending: NURSE PRACTITIONER

## 2021-05-11 ENCOUNTER — OFFICE VISIT (OUTPATIENT)
Dept: CARDIOLOGY | Facility: CLINIC | Age: 66
End: 2021-05-11

## 2021-05-11 VITALS
DIASTOLIC BLOOD PRESSURE: 88 MMHG | SYSTOLIC BLOOD PRESSURE: 152 MMHG | WEIGHT: 218 LBS | HEART RATE: 76 BPM | BODY MASS INDEX: 36.32 KG/M2 | HEIGHT: 65 IN

## 2021-05-11 DIAGNOSIS — I10 ESSENTIAL HYPERTENSION: ICD-10-CM

## 2021-05-11 DIAGNOSIS — E11.8 TYPE 2 DIABETES MELLITUS WITH COMPLICATION, WITHOUT LONG-TERM CURRENT USE OF INSULIN (HCC): ICD-10-CM

## 2021-05-11 DIAGNOSIS — Z95.5 HISTORY OF CORONARY ARTERY STENT PLACEMENT: Primary | ICD-10-CM

## 2021-05-11 PROCEDURE — 99214 OFFICE O/P EST MOD 30 MIN: CPT | Performed by: INTERNAL MEDICINE

## 2021-05-11 PROCEDURE — 93000 ELECTROCARDIOGRAM COMPLETE: CPT | Performed by: INTERNAL MEDICINE

## 2021-05-11 NOTE — PROGRESS NOTES
Date of Office Visit: 21  Encounter Provider: Gigi Dailey MD  Place of Service: [facility] CARDIOLOGY  Patient Name: Jesse Espinal  :1955  [id number]    Chief Complaint   Patient presents with   • Coronary Artery Disease   :     HPI: Jesse Espinal is a 66 y.o. female  she has a history of coronary artery disease in  she had 4 stents put in the [facility] and in  she had 2 stents put in it had her on chronic Brilinta since.  She has been okay she has a little bit of dyspnea on exertion she had some discomfort in her back the other day when she was working in the garden and she had to stop she is not really had chest pain but occasionally she has some pressure sometimes up into her jaw no PND no orthopnea little bit of edema.  No change in her weight sugars have been good blood pressures have been in general much better than what we are getting here today    Past Medical History:   Diagnosis Date   • Acid reflux disease    • Angina pectoris (CMS/[facility])    • Anxiety    • CAD (coronary artery disease)    • Depression    • Diabetes mellitus, type II (CMS/HCC)    • Dysuria    • Fatigue    • Foot pain    • GERD (gastroesophageal reflux disease)    • Hematuria    • Hypercholesterolemia    • Hypertension    • MORRIS (obstructive sleep apnea)        Past Surgical History:   Procedure Laterality Date   • APPENDECTOMY     • CARDIAC CATHETERIZATION  2013    SEVERE 3 VESSEL CAD   • CHOLECYSTECTOMY  2013    DIVERTICULOSIS AND INTERNAL HEMORRHOIDS   • CORONARY ANGIOPLASTY WITH STENT PLACEMENT  x2   • CORONARY STENT PLACEMENT  2013 and 2014    LAD x4   • CYSTOSCOPY  2016   • HYSTERECTOMY     • OOPHORECTOMY         Social History     Socioeconomic History   • Marital status:      Spouse name: Not on file   • Number of children: Not on file   • Years of education: Not on file   • Highest education level: Not on file   Tobacco Use   • Smoking  status: Former Smoker     Types: Cigarettes   • Smokeless tobacco: Never Used   • Tobacco comment: CAFFEINE USE: 4- 8OZ GLASSES DAILY/ 2 CUPS COFFEE WKLY   Substance and Sexual Activity   • Alcohol use: Yes     Alcohol/week: 1.0 standard drinks     Types: 1 Glasses of wine per week     Comment: occ   • Drug use: No   • Sexual activity: Defer       Family History   Problem Relation Age of Onset   • Colon cancer Mother    • Coronary artery disease Mother    • Hypertension Mother    • Breast cancer Mother    • Diabetes Mother    • Heart disease Mother    • Stroke Father    • Liver disease Father    • Coronary artery disease Sister    • Hyperlipidemia Sister    • Coronary artery disease Brother    • Lung cancer Brother    • Hypertension Son    • No Known Problems Maternal Grandmother    • No Known Problems Maternal Grandfather    • No Known Problems Paternal Grandmother    • No Known Problems Paternal Grandfather        Review of Systems   Constitutional: Negative for decreased appetite, fever, malaise/fatigue and weight loss.   HENT: Negative for nosebleeds.    Eyes: Negative for double vision.   Cardiovascular: Negative for chest pain, claudication, cyanosis, dyspnea on exertion, irregular heartbeat, leg swelling, near-syncope, orthopnea, palpitations, paroxysmal nocturnal dyspnea and syncope.   Respiratory: Negative for cough, hemoptysis and shortness of breath.    Hematologic/Lymphatic: Negative for bleeding problem.   Skin: Negative for rash.   Musculoskeletal: Negative for falls and myalgias.   Gastrointestinal: Negative for hematochezia, jaundice, melena, nausea and vomiting.   Genitourinary: Negative for hematuria.   Neurological: Negative for dizziness and seizures.   Psychiatric/Behavioral: Negative for altered mental status and memory loss.       Allergies   Allergen Reactions   • Effient [Prasugrel]    • Other      SHELL FISH   • Sulfa Antibiotics          Current Outpatient Medications:   •  amLODIPine  "(NORVASC) 5 MG tablet, Take 5 mg by mouth Daily., Disp: , Rfl:   •  aspirin 81 MG chewable tablet, Chew 81 mg Daily., Disp: , Rfl:   •  isosorbide mononitrate (IMDUR) 30 MG 24 hr tablet, Take 30 mg by mouth Daily., Disp: , Rfl:   •  losartan (COZAAR) 100 MG tablet, Take 100 mg by mouth Daily., Disp: , Rfl:   •  metFORMIN (GLUCOPHAGE) 500 MG tablet, Take 500 mg by mouth Daily With Breakfast., Disp: , Rfl:   •  metoprolol succinate XL (TOPROL-XL) 100 MG 24 hr tablet, Take 1 tablet by mouth Daily., Disp: 90 tablet, Rfl: 3  •  rosuvastatin (CRESTOR) 20 MG tablet, Take 1 tablet by mouth Daily., Disp: 30 tablet, Rfl: 11  •  ticagrelor (Brilinta) 60 MG tablet tablet, Take 1 tablet by mouth Every 12 (Twelve) Hours., Disp: 60 tablet, Rfl: 11      Objective:     Vitals:    05/11/21 1341   BP: 152/88   Pulse: 76   Weight: 98.9 kg (218 lb)   Height: 163.8 cm (64.5\")     Body mass index is 36.84 kg/m².    Physical Exam      ECG 12 Lead    Date/Time: 5/11/2021 2:19 PM  Performed by: Gigi Dailey MD  Authorized by: Gigi Dailey MD   Comparison: compared with previous ECG   Similar to previous ECG  Rhythm: sinus rhythm    Clinical impression: normal ECG             Assessment:       Diagnosis Plan   1. History of coronary artery stent placement     2. Essential hypertension     3. Type 2 diabetes mellitus with complication, without long-term current use of insulin (CMS/[facility])            Plan:       So I think at this point she is probably doing okay but were a little bit concerned about her symptoms enough so that we think we should get a nuclear stress test this to be an adjuvant test because she has had stenting.  I will get that set up if that is okay I encouraged her to lose a little bit of weight she has been vaccinated and I also encouraged her to start on the Jardiance which is significantly every drug for her    As always, it has been a pleasure to participate in your patient's care.      Sincerely,       Gigi " MD Asim

## 2021-05-13 NOTE — PROGRESS NOTES
Progress Note      Patient Name: Jesse Espinal   Patient ID: [id number]   Sex: Female   YOB: 1955    Primary Care Provider: Quyen LALA   Referring Provider: Quyen LALA    Visit Date: October 1, 2020    Provider: Jose Luis Marin DPM   Location: [facility]   Location Address: [address] [place] [address]  [address] [place] [address]  [id number]   Location Phone: [phone]          Chief Complaint  · Diabetic Foot Evaluation      History Of Present Illness  Jesse Espinal presents to the office today for a diabetic foot evaluation. She is a diabetic currently controlling diabetes with oral medication.        New, Established, New Problem:  Established   Location:  Feet bilaterally  Duration:  2 years  Onset:  Gradual  Nature:  NIDDM  Stable, worsening, improving: Stable   Aggravating factors:   None reported  Previous Treatment:  Treating with oral medication     Patient denies any fevers, chills, nausea, vomiting, shortness of breath or any other constitutional signs nor symptoms.      Patient reports that no changes in their medications with their recent appointment with their primary care provider.    The patient's last A1c was 6.0.       Past Medical History  Acid reflux; Athletes foot; Callus; Coronary Artery Disease; Diabetes; Foot pain, left; Hematuria (Microscopic); High blood pressure; High cholesterol; Kidney Disease; Type 2 diabetes mellitus         Past Surgical History  Appendectomy; Cardiac Catherization; cardiac stents; Cholecstectomy; Cystoscopy; Cystoscopy with bilateral retrograde pyelography; Hysterectomy         Medication List  amlodipine 5 mg oral tablet; aspirin 81 mg oral tablet,delayed release (DR/EC); Brilinta 60 mg oral tablet; isosorbide mononitrate 30 mg oral tablet extended release 24 hr; losartan 100 mg oral tablet; metformin 500 mg oral tablet; metoprolol succinate 50 mg oral tablet extended release 24 hr; ranitidine  "HCl 150 mg oral capsule; simvastatin 20 mg oral tablet         Allergy List  Blood Thinners; IVP Dye; Shell Fish (shrimp, crayfish, lobster, crab); SULFA (SULFONAMIDES)       Allergies Reconciled  Family Medical History  Stroke; Heart Disease; Diabetes Mellitus, Type II         Social History  Alcohol (Light); Tobacco (Former)         Review of Systems  · Constitutional  o Denies  o : fever, chills  · Eyes  o Denies  o : double vision  · HENT  o Denies  o : vertigo, recent head injury  · Cardiovascular  o Denies  o : chest pain, irregular heart beats  · Respiratory  o Denies  o : shortness of breath  · Gastrointestinal  o Denies  o : nausea, vomiting  · Integument  o * See HPI  · Neurologic  o Denies  o : tingling or numbness  · Musculoskeletal  o Denies  o : joint pain, joint swelling, limitation of motion  · Endocrine  o Denies  o : polydipsia      Vitals  Date Time BP Position Site L\R Cuff Size HR RR TEMP (F) WT  HT  BMI kg/m2 BSA m2 O2 Sat FR L/min FiO2 HC       10/01/2020 01:33 /79 Sitting    79 - R  97.6 212lbs 16oz 5'  4.5\" 36 2.1 94 %            Physical Examination  · Constitutional  o Appearance  o : overweight, well developed, no obvious deformities present  · Cardiovascular  o Peripheral Vascular System  o :   § Pedal Pulses  § : 2+ and symmetrical  § Extremities  § : no edema noted  · Musculoskeletal  o Extremeties/Joint  o : Lower extremity muscle strength and range of motion is equal and symmetrical bilaterally. The knees are noted to be in normal alignment. Ankle alignment and range of motion is normal and foot structure is normal. Subtalar, metatarsal and metatarsal-phalangeal joint range of motion is noted to be within normal limits. The digits of both feet are in normal alignment. The gait is normal.  · Neurologic  o Muskuloskeletal  o :   § RLE  § : Sharp/dull sensation is within normal limits. [place] 5.07 monofilament intact to all assessed areas.   § LLE  § : Sharp/dull " sensation is within normal limits. [place]-Miri 5.07 monofilament intact to all assessed areas.   · Left DM Foot Exam  o Sensation  o : Sharp/dull sensation is within normal limits. [place]iri 5.07 monofilament intact to all assessed areas.   o Visual Inspection  o : Skin is noted to have normal texture and turgor, with no excrescences noted. The toenails are noted to be without disease  o Vascular  o : palpable dorsalis pedis and posterir tibialis pulses present, normal capillary refill  · Right DM Foot Exam  o Sensation  o : Sharp/dull sensation is within normal limits. [place]iri 5.07 monofilament intact to all assessed areas.   o Visual Inspection  o : Skin is noted to have normal texture and turgor, with no excrescences noted. The toenails are noted to be without disease  o Vascular  o : palpable dorsalis pedis and posterir tibialis pulses present, normal capillary refill          Assessment  · Diabetes     250.00/E11.9      Plan  · Medications  o Medications have been Reconciled  o Transition of Care or Provider Policy  · Instructions  o I have discussed the findings of this evaluation with the patient. The discussion included a complete verbal explanation of any changes in the examination results, diagnosis, and the current treatment plan. A schedule for future care needs was explained. If any questions should arise after returning home, I have encouraged the patient to feel free to contact Dr. Marin. The patient states understanding and agreement with this plan.   o Patient is to monitor for problems and to contact Dr. Marin for follow-up should such signs occur. Patient states understanding and agreement with this plan.   o Encouraged to follow-up with Primary Care Provider for preventative care.   o Diabetic foot exam performed and documented this date, compliant with CQM required standards. Detail of findings as noted in physical exam.Lower extremity Neuro exam for diabetic patient  performed and documented this date, compliant with PQRS required standards. Detail of findings as noted in physical exam.Advised patient importance of good routine lower extremity hygiene. Advised patient importance of evaluating for intact skin and pain free nail borders. Advised patient to use mirror to evaluate plantar/ soles of feet for better visualization. Advised patient monitor and phone office to be seen if any cracking to skin, open lesions, painful nail borders or if nails become elongated prior to next visit. Advised patient importance of daily cleansing of lower extremities, followed by good skin cream to maintain normal hydration of skin. Also advised patient importance of close daily monitoring of blood sugar. Advised to regulate diet and medications to maintain control of blood sugar in optimal range. Contact primary care provider if difficulties maintaining blood sugar levels.Advised Patient of presence of Diabetes Mellitus condition. Advised Patient risk of progression and worsening or improvement, then return of condition. Will monitor condition for any change in future. Treat with most appropriate treatment pending status of condition. Counseled and advised patient extensively on nature and ramifications of diabetes. Standard instructions given to patient for good diabetic foot care and maintenance. Advised importance of careful monitoring to avoid break down and complications secondary to diabetes. Advised patient importance of strict maintenance of blood sugar control. Advised patient of possible ominous results from neglect of condition, i.e.: amputation/ loss of digits, feet and legs, or even death.Patient states understands counseling, will monitor closely, continue good hygiene and routine diabetic foot care. Patient will contact office is questions or problems.   o BMI Counseling: Discussed weight loss and the need for exercise.   o Follow up in one year for Podiatric Diabetic Evaluation.    o Electronically Identified Patient Education Materials Provided Electronically  · Disposition  o Call or Return if symptoms worsen or persist.            Electronically Signed by: Jose Luis Marin DPM -Author on October 1, 2020 01:39:37 PM

## 2021-05-14 VITALS
HEART RATE: 79 BPM | HEIGHT: 64 IN | WEIGHT: 213 LBS | TEMPERATURE: 97.6 F | DIASTOLIC BLOOD PRESSURE: 79 MMHG | BODY MASS INDEX: 36.37 KG/M2 | SYSTOLIC BLOOD PRESSURE: 131 MMHG | OXYGEN SATURATION: 94 %

## 2021-05-15 VITALS
WEIGHT: 207 LBS | BODY MASS INDEX: 35.34 KG/M2 | DIASTOLIC BLOOD PRESSURE: 63 MMHG | HEART RATE: 72 BPM | OXYGEN SATURATION: 94 % | SYSTOLIC BLOOD PRESSURE: 139 MMHG | HEIGHT: 64 IN

## 2021-05-15 VITALS
WEIGHT: 210.25 LBS | SYSTOLIC BLOOD PRESSURE: 148 MMHG | BODY MASS INDEX: 35.9 KG/M2 | DIASTOLIC BLOOD PRESSURE: 88 MMHG | HEIGHT: 64 IN

## 2021-05-16 VITALS
DIASTOLIC BLOOD PRESSURE: 64 MMHG | HEIGHT: 64 IN | WEIGHT: 202 LBS | HEART RATE: 73 BPM | SYSTOLIC BLOOD PRESSURE: 187 MMHG | OXYGEN SATURATION: 94 % | BODY MASS INDEX: 34.49 KG/M2

## 2021-05-16 VITALS — DIASTOLIC BLOOD PRESSURE: 69 MMHG | SYSTOLIC BLOOD PRESSURE: 154 MMHG

## 2021-05-17 RX ORDER — ROSUVASTATIN CALCIUM 20 MG/1
20 TABLET, COATED ORAL DAILY
Qty: 90 TABLET | Refills: 2 | Status: SHIPPED | OUTPATIENT
Start: 2021-05-17 | End: 2021-07-27 | Stop reason: SDUPTHER

## 2021-05-18 NOTE — PROGRESS NOTES
Jesse Espinal 1955     Office/Outpatient Visit    Visit Date: Mon, Jan 22, 2018 11:42 am    Provider: Quyen Farah N.P. (Assistant: Mendy Delcid MA)    Location: [facility]        Electronically signed by Quyen Farah N.P. on  01/22/2018 12:48:20 PM                             SUBJECTIVE:        CC:     Ms. Espinal is a 62 year old White female.  medication refill;         HPI:         Patient presents with hypercholesterolemia.  Current treatment includes Zocor.  Compliance with treatment has been good; she takes her medication as directed.  She denies experiencing any hypercholesterolemia related symptoms.          In regard to the hypertension, her current cardiac medication regimen includes a diuretic ( Lasix ), a calcium channel blocker ( Norvasc ), and an angiotensin receptor blocker ( Cozaar ).  She is tolerating the medication well without side effects.  Compliance with treatment has been good; she takes her medication as directed.          Dx with type 2 diabetes; current meds include an oral hypoglycemic ( Glucophage XR ( 500mg qd ) ).  She reports home blood glucose readings have been fairly good, with average fasting glucoses running in the 120-150 mg/dL range.  Most recent lab results include Systolic BP:  145 (01/22/2018), Diastolic BP:  72 (01/22/2018), Hemoglobin A1c:  6.2 (10/09/2017), HDL:  36 (mg/dL) (06/22/2017), Triglycerides:  186 (mg/dL) (06/22/2017), Microalbumin, Urine, rand:  505.7 (ug/mL) (06/22/2017).      ROS:     CONSTITUTIONAL:  Negative for chills, fatigue, fever, and weight change.      EYES:  Positive for left eye swollen and red and matted together this am.      CARDIOVASCULAR:  Negative for chest pain, palpitations, tachycardia, orthopnea, and edema.      RESPIRATORY:  Negative for cough, dyspnea, and hemoptysis.      NEUROLOGICAL:  Positive for dizziness ( worse with head turning ).  intermittent problem         PMH/FMH/SH:     Last  Reviewed on 2018 11:54 AM by Quyen Farah    Past Medical History:             GYNECOLOGICAL HISTORY:             Surgical History:         Appendectomy    Cholecystectomy    Hysterectomy      Coronary Artery Stent Placement: 13 and 14 LAD; Procedures: colonoscopy 13, diverticulosis and internal hemorrhoids heart cath , severe 3 vessel CAD     Procedures: cystoscopy 16         Family History:     Father:  at age 45; Cause of death was stroke;  liver disease     Mother:  at age 75; Cause of death was colon;  Coronary Artery Disease; Hypertension;  Breast Cancer; Colon Cancer;  Type 2 Diabetes     Brother(s): Coronary Artery Disease;  Lung Cancer     Sister(s): Coronary Artery Disease; Hyperlipidemia     Son(s): Hypertension         Social History:     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              Immunizations:     Zostavax versus Placebo 2008     Fluzone pf-quadrivalent 3 and up 2016     Fluzone Quadrivalent (3+ years) 2017     Zostavax (Zoster) 2008         Allergies:     Last Reviewed on 2018 11:46 AM by Mendy Delcid    Effient:    Sulfas:    shell fish:        Current Medications:     Last Reviewed on 2018 11:48 AM by Mendy Delcid    Norvasc 5mg Tablet 1 tab daily     Imdur 30mg Tablets, Extended Release Take 1 tablet(s) by mouth qam     Cozaar 100mg Tablet Take 1 tablet(s) by mouth daily     Metformin HCl 500mg Tablets, Extended Release 1 tab q day with largest meal     Metoprolol Succinate 50mg Tablets, Extended Release Take 1 tablet(s) by mouth daily     One Touch Ultra Blue Test Strips  Reagent Strips check blood sugar 1-2 times daily DX: E11.9     Lancet   Lancet as directed  QD  Diag   E11.9   One Touch      Zocor 40mg Tablet Take 1/2  tablet(s) by mouth daily     Lasix 20mg Tablets 1/2 - 1 tablet PO daily ONLY as needed     Brilinta 60mg Tablet Take 1 tablet(s) by mouth bid     ASA  81 MG QD         OBJECTIVE:        Vitals:         Current: 1/22/2018 11:45:38 AM    Ht:  5 ft, 3.5 in;  Wt: 208.1 lbs;  BMI: 36.3    T: 97.9 F (oral);  BP: 145/72 mm Hg (left arm, sitting);  P: 81 bpm (left arm (BP Cuff), sitting);  sCr: 1.17 mg/dL;  GFR: 55.94        Exams:     PHYSICAL EXAM:     GENERAL: vital signs recorded - well developed, well nourished;  no apparent distress;     EYES: hyperemic left conjunctiva; swelling below left eye lid     NECK: carotid exam reveals no bruits;     RESPIRATORY: normal respiratory rate and pattern with no distress; normal breath sounds with no rales, rhonchi, wheezes or rubs;     CARDIOVASCULAR: normal rate; rhythm is regular;  no systolic murmur;    Peripheral Pulses: posterior tibial: equal bilaterally;  no edema;     BREAST/INTEGUMENT: skin of feet and toenails intact;     NEUROLOGIC: sensation intact to monofilament bilaterally;     PSYCHIATRIC:  appropriate affect and demeanor; normal speech pattern; grossly normal memory;         ASSESSMENT           272.0   E78.5  Hypercholesterolemia              DDx:     401.1   I10  Hypertension              DDx:     250.00   E11.9  Type 2 diabetes              DDx:     V69.8   Z72.89  Other problems related to lifestyle              DDx:     372.03   H10.022  Bacterial conjunctivitis              DDx:     414.00   I25.10  Coronary artery disease              DDx:         ORDERS:         Meds Prescribed:       Tobramycin 0.3% Ophthalmic Solution 1 drop qid x 5 days  #5 (Five) ml Refills: 0       Refill of: Norvasc (Amlodipine ) 5mg Tablet 1 tab daily  #90 (Ninety) tablet(s) Refills: 1       Refill of: Cozaar (Losartan) 100mg Tablet Take 1 tablet(s) by mouth daily  #90 (Ninety) tablet(s) Refills: 1       Refill of: Metformin HCl 500mg Tablets, Extended  Release 1 tab q day with largest meal  #90 (Ninety) tablet(s) Refills: 1       Refill of: Metoprolol Succinate 50mg Tablets, Extended Release Take 1 tablet(s) by mouth daily  #90 (Ninety) tablet(s) Refills: 1       Refill of: Zocor (Simvastatin) 40mg Tablet Take 1/2  tablet(s) by mouth daily  #90 (Ninety) tablet(s) Refills: 1       Refill of: Lasix (Furosemide) 20mg Tablet 1/2 - 1 tablet PO daily ONLY as needed  #90 (Ninety) tablet(s) Refills: 1         Lab Orders:       [id number]  DIAB2 - [facility] CMP A1C LIPID AND MICRO ALBUM CR RATIO: [id number]  (Send-Out)         [id number]  [facility] Hepatitis C antibody  (Send-Out)           Procedures Ordered:       REFER  Referral to Specialist or Other Facility  (Send-Out)                   PLAN:          Hypercholesterolemia         RECOMMENDATIONS given include: low cholesterol/low fat diet.      FOLLOW-UP: Schedule a follow-up visit in 6 months.            Prescriptions:       Refill of: Zocor (Simvastatin) 40mg Tablet Take 1/2  tablet(s) by mouth daily  #90 (Ninety) tablet(s) Refills: 1          Hypertension           Prescriptions:       Refill of: Norvasc (Amlodipine ) 5mg Tablet 1 tab daily  #90 (Ninety) tablet(s) Refills: 1       Refill of: Cozaar (Losartan) 100mg Tablet Take 1 tablet(s) by mouth daily  #90 (Ninety) tablet(s) Refills: 1       Refill of: Metoprolol Succinate 50mg Tablets, Extended Release Take 1 tablet(s) by mouth daily  #90 (Ninety) tablet(s) Refills: 1       Refill of: Lasix (Furosemide) 20mg Tablet 1/2 - 1 tablet PO daily ONLY as needed  #90 (Ninety) tablet(s) Refills: 1          Type 2 diabetes had been going to 20/20 /set up a diabetic eye exam with Dr. Morales /update diabetes flow sheet     LABORATORY:  Labs ordered to be performed today include Diabetes Panel 2;CMP, A1C, Lipid, Microalbumin:Creatinine Ratio.      REFERRALS:  Referral initiated to Dr Morales, diabetic eye exam.            Prescriptions:       Refill of: Metformin HCl  500mg Tablets, Extended Release 1 tab q day with largest meal  #90 (Ninety) tablet(s) Refills: 1           Orders:       [id number]  DIAB2 - [facility] CMP A1C LIPID AND MICRO ALBUM CR RATIO: [id number]  (Send-Out)         REFER  Referral to Specialist or Other Facility  (Send-Out)            Other problems related to lifestyle     LABORATORY:  Labs ordered to be performed today include Hepatitis C Antibody.            Orders:       [id number] Hepatitis C antibody  (Send-Out)             Patient Education Handouts:       Hepatitis C           Bacterial conjunctivitis         RECOMMENDATIONS given include: warm compresses.      FOLLOW-UP: Advised to call if there is no improvement in 2 day(s).            Prescriptions:       Tobramycin 0.3% Ophthalmic Solution 1 drop qid x 5 days  #5 (Five) ml Refills: 0          Coronary artery disease her cardiologist, Dr Dailey appt moved to 3-2018             Patient Recommendations:        For  Hypercholesterolemia:     Reduce the amount of cholesterol and saturated fat in your diet.  Schedule a follow-up visit in 6 months.          For  Type 2 diabetes:     I also recommend Dr Morales, diabetic eye exam.              CHARGE CAPTURE           **Please note: ICD descriptions below are intended for billing purposes only and may not represent clinical diagnoses**        Primary Diagnosis:         272.0 Hypercholesterolemia            E78.5    Hyperlipidemia, unspecified              Orders:          00100   Office/outpatient visit; established patient, level 4  (In-House)           401.1 Hypertension            I10    Essential (primary) hypertension    250.00 Type 2 diabetes            E11.9    Type 2 diabetes mellitus without complications    V69.8 Other problems related to lifestyle            Z72.89    Other problems related to lifestyle    372.03 Bacterial conjunctivitis            H10.022    Other mucopurulent conjunctivitis, left eye    414.00 Coronary artery  disease            I25.10    Atherosclerotic heart disease of native coronary artery without angina pectoris        ADDENDUMS:      ____________________________________    Addendum: 01/25/2018 10:29 AM - Lisa Stone         Visit Note Faxed to:        [facility]  (Nephrology); Number [phone]     Health Summary Faxed to:        [facility]  (Nephrology); Number [phone]

## 2021-05-18 NOTE — PROGRESS NOTES
Jesse Espinal  1955     Office/Outpatient Visit    Visit Date: Tue, Jan 26, 2021 02:37 pm    Provider: Quyen Farah N.P. (Assistant: Tatum Nath MA)    Location: [facility]        Electronically signed by Quyen Farah N.P. on  01/26/2021 06:40:22 PM                             Subjective:        CC: Ms. Espinal is a 65 year old White female.  This is a follow-up visit.          HPI:           Patient presents with essential (primary) hypertension.  Her current cardiac medication regimen includes a beta-blocker ( Toprol-XL ), a calcium channel blocker ( Norvasc ), and an angiotensin receptor blocker ( Cozaar ).  Review of her blood pressure log reveals systolics in the 103-148 and diastolics in the 60-80.  She is tolerating the medication well without side effects.  Compliance with treatment has been good; she takes her medication as directed.            Mixed hyperlipidemia details; current treatment includes Crestor.  Compliance with treatment has been good; she takes her medication as directed.  She denies experiencing any hypercholesterolemia related symptoms.  Most recent lab tests include Hemoglobin A1c:  6.0 (%) (07/29/2020), Glucose, Serum:  119 (mg/dL) (07/29/2020), ALT (SGPT):  26 (U/L) (07/29/2020), Total Cholesterol:  119 (mg/dL) (07/29/2020), HDL:  40 (mg/dL) (07/29/2020), Triglycerides:  131 (mg/dL) (07/29/2020), LDL:  53 (mg/dL) (07/29/2020).            Concerning type 2 diabetes mellitus without complications, current meds include an oral hypoglycemic ( Glucophage XR ( 500mg qd ) ).  She reports home blood glucose readings have been fairly good, with average fasting glucoses running in the 120-150 mg/dL range.  Most recent lab results include Weight (lb):  210.2 (01/22/2020), Microalbuminuria:  1572.8 (mg/g creat) (07/12/2018), Foot Exam (Annual):  01/22/2020 (01/22/2020).      ROS:     CONSTITUTIONAL:  Negative for fever.      E/N/T:  Positive for  check her throat/ sore intermettently over last 2 weeks.      CARDIOVASCULAR:  Negative for chest pain, palpitations, tachycardia, orthopnea, and edema.      RESPIRATORY:  Negative for recent cough and dyspnea.      NEUROLOGICAL:  Positive for mild memory issues her  thinks.          Past Medical History / Family History / Social History:         Last Reviewed on 2021 03:16 PM by Quyen Farah    Past Medical History:                 PAST MEDICAL HISTORY             GYNECOLOGICAL HISTORY:             CURRENT MEDICAL PROVIDERS:    Cardiologist: Dr Dailey         PREVENTIVE HEALTH MAINTENANCE             Hepatitis C Medicare Screening: was last done ; negative         Surgical History:         Appendectomy    Cholecystectomy    Hysterectomy     Coronary Artery Stent Placement: 13 and 14 LAD; Procedures: colonoscopy 13, diverticulosis and internal hemorrhoids heart cath , severe 3 vessel CAD     Procedures: cystoscopy 15         Family History:     Father:  at age 45; Cause of death was stroke;  liver disease     Mother:  at age 75; Cause of death was colon;  Coronary Artery Disease; Hypertension;  Breast Cancer; Colon Cancer;  Type 2 Diabetes     Brother(s): 4 brother(s) total; 2 ;  Coronary Artery Disease;  Lung Cancer     Sister(s): 3 sister(s) total; 2 ;  Coronary Artery Disease; Hyperlipidemia; Neurological/Genetic Cerebrovascular Accident (  70 );  pneumonia     Son(s): 3 son(s) total;  Hypertension; Hyperlipidemia         Social History:     Occupation: [employer] off work      Marital Status:      Children: 3 children         Tobacco/Alcohol/Supplements:     Last Reviewed on 2021 02:46 PM by Tatum Nath    Tobacco: She has a past history of cigarette smoking; quit date:  .          Immunizations:     Influenza, high dose seasonal (FLUZONE HIGH-DOSE 6199-4907) 2019    influenza, high-dose, quadrivalent  (FLUZONE HIGH-DOSE QUAD 2020-21) 10/1/2020    Zostavax versus Placebo 9/18/2008    zzFluzone pf-quadrivalent 3 and up 1/6/2016    Fluzone Quadrivalent (3+ years) 1/26/2017    Fluzone Quadrivalent (3+ years) 11/7/2018    Zostavax (Zoster live) 9/20/2008        Allergies:     Last Reviewed on 1/26/2021 02:46 PM by Tatum Nath    shell fish:      Effient:      Sulfas:          Current Medications:     Last Reviewed on 1/26/2021 02:46 PM by Tatum Nath    metoprolol succinate 100 mg oral Tablet, Extended Release 24 hr [take 1 tablet (100 mg) by oral route once daily]    losartan 100 mg oral tablet [TAKE 1 TABLET BY MOUTH DAILY]    ASA  81 MG QD     amLODIPine 5 mg oral tablet [TAKE 1 TABLET BY MOUTH ONCE DAILY]    Brilinta 60 mg oral tablet [Take 1 tablet(s) by mouth bid]    metFORMIN 500 mg oral Tablet, Extended Release 24 hr [TAKE 1 TABLET BY MOUTH EVERY DAY WITH LARGEST MEAL]    Lancet   Lancet [as directed  QD  Diag   E11.9   One Touch]    OneTouch Ultra Blue Test Strip  [USE TO TEST BLOOD SUGAR 1-2 TIMES DAILY]    isosorbide mononitrate 30 mg oral Tablet, Extended Release 24 hr [TAKE 1 TABLET BY MOUTH DAILY]    ROSUVASTATIN TAB 20MG  [1 tab daily]        Objective:        Vitals:         Current: 1/26/2021 2:45:11 PM    Ht:  5 ft, 3.5 in;  Wt: 217.6 lbs;  BMI: 37.9T: 97.1 F (temporal);  BP: 152/64 mm Hg (left arm, sitting);  P: 76 bpm (left arm (BP Cuff), sitting);  sCr: 1.05 mg/dL;  GFR: 61.13        Repeat:     2:46:41 PM  BP:   153/52mm Hg (left arm, sitting, P 69) 3:26:41 PM  BP:   151/67mm Hg (right arm, sitting, P 70)     Exams:     PHYSICAL EXAM:     GENERAL: vital signs recorded - well developed, well nourished;  no apparent distress;     E/N/T: EARS:  normal external auditory canals and tympanic membranes;  grossly normal hearing; OROPHARYNX: posterior pharynx shows 2+ tonsils and erythematous tonsils;     NECK: carotid exam reveals no bruits;     RESPIRATORY: normal respiratory rate and  pattern with no distress; normal breath sounds with no rales, rhonchi, wheezes or rubs;     CARDIOVASCULAR: normal rate; rhythm is regular;  no systolic murmur; no edema;     LYMPHATIC: no enlargement of cervical or facial nodes;     PSYCHIATRIC:  appropriate affect and demeanor; normal speech pattern; grossly normal memory;         Lab/Test Results:         Rapid Strep Screen: Negative (01/26/2021),     Performed by:: ANGEL (01/26/2021),             Assessment:         I10   Essential (primary) hypertension       E78.2   Mixed hyperlipidemia       E11.9   Type 2 diabetes mellitus without complications       J02.9   Acute pharyngitis, unspecified           ORDERS:         Meds Prescribed:       [Refilled] metoprolol succinate 100 mg oral Tablet, Extended Release 24 hr [take 1 tablet (100 mg) by oral route once daily], #90 (ninety) tablets, Refills: 1 (one)       [Refilled] amLODIPine 5 mg oral tablet [TAKE 1 TABLET BY MOUTH ONCE DAILY], #90 (ninety) tablets, Refills: 1 (one)       [Refilled] losartan 100 mg oral tablet [TAKE 1 TABLET BY MOUTH DAILY], #90 (ninety) tablets, Refills: 0 (zero)       [Refilled] metFORMIN 500 mg oral Tablet, Extended Release 24 hr [TAKE 1 TABLET BY MOUTH EVERY DAY WITH LARGEST MEAL], #90 (ninety) tablets, Refills: 0 (zero)       [Refilled] rosuvastatin 20 mg oral tablet [take 1 tablet (20 mg) by oral route once daily], #90 (ninety) tablets, Refills: 0 (zero)         Lab Orders:       [id number]  DIAB1 - [facility] LIPID,CMP, A1C: 87949, 81288, 09436  (Send-Out)            04151  TSH - [facility] TSH  (Send-Out)            35[facility]  [facility]  (Send-Out)            66020  Group A Streptococcus detection by immunoassay with direct optical observation  (In-House)            14070  [facility] Throat culture, strep  (Send-Out)                      Plan:         Essential (primary) hypertensionreviewed her blood pressure log she brought with her today, she is fasting today; has had her flu  vaccine/ will check a few labs related to some mild memory symptoms    LABORATORY:  Labs ordered to be performed today include TSH.      RECOMMENDATIONS given include: perform routine monitoring of blood pressure with home blood pressure cuff, exercise, reduction of dietary salt intake, and weight loss.            Prescriptions:       [Refilled] metoprolol succinate 100 mg oral Tablet, Extended Release 24 hr [take 1 tablet (100 mg) by oral route once daily], #90 (ninety) tablets, Refills: 1 (one)       [Refilled] amLODIPine 5 mg oral tablet [TAKE 1 TABLET BY MOUTH ONCE DAILY], #90 (ninety) tablets, Refills: 1 (one)       [Refilled] losartan 100 mg oral tablet [TAKE 1 TABLET BY MOUTH DAILY], #90 (ninety) tablets, Refills: 0 (zero)           Orders:       [id number]  TSH - [facility] TSH  (Send-Out)              Mixed hyperlipidemia        RECOMMENDATIONS given include: low cholesterol/low fat diet.      FOLLOW-UP: pending labs           Prescriptions:       [Refilled] rosuvastatin 20 mg oral tablet [take 1 tablet (20 mg) by oral route once daily], #90 (ninety) tablets, Refills: 0 (zero)         Type 2 diabetes mellitus without complicationsreviewed diabetes flow sheet, she has an upcoming appt at [facility] 2-10-21/she saw neph in march and in 9-2020, declined foot exam, podiatrist did her foot exam last year, sent for his note, last note 2018    LABORATORY:  Labs ordered to be performed today include Diabetes Panel 1; CMP, Lipid, A1C.            Prescriptions:       [Refilled] metFORMIN 500 mg oral Tablet, Extended Release 24 hr [TAKE 1 TABLET BY MOUTH EVERY DAY WITH LARGEST MEAL], #90 (ninety) tablets, Refills: 0 (zero)           Orders:       [id number]  DIAB - [facility] LIPID,CMP, A1C: 38008, 49984, 59563  (Send-Out)              Acute pharyngitis, unspecifiedRSS negative     LABORATORY:  Labs ordered to be performed today include CBC.      LABORATORY:  Lab studies ordered today include rapid strep screen.      RECOMMENDATIONS  given include: Salt water gargle.      FOLLOW-UP: Advised to call if there is no improvement in 2 day(s).   pending throat culture and CBC           Orders:       09473  [facility] CBC with 3 part diff  (Send-Out)            48761  Group A Streptococcus detection by immunoassay with direct optical observation  (In-House)            08680  [facility] Throat culture, strep  (Send-Out)                  Patient Recommendations:        For  Essential (primary) hypertension:    Begin monitoring your blood pressure by brief nurse visits at our office, a home blood pressure monitor, or by checking on the machines in pharmacies or stores.  Keep a log of the readings. Maintain a regular exercise program. Reduce the amount of salt in your diet. Try to lose some weight; even modest weight reduction can improve your blood pressure.          For  Mixed hyperlipidemia:    Reduce the amount of cholesterol and saturated fat in your diet.          For  Acute pharyngitis, unspecified:    Make salt water solution by combining 1/2 to 1 teaspoons of table salt with 8 ounces of warm water.  Gargle for 10 seconds and spit out salt water.  Repeat several times a day as needed.              Charge Capture:         Primary Diagnosis:     I10  Essential (primary) hypertension           Orders:      00484  Office/outpatient visit; established patient, level 4  (In-House)              E78.2  Mixed hyperlipidemia     E11.9  Type 2 diabetes mellitus without complications     J02.9  Acute pharyngitis, unspecified           Orders:      [id number]  Group A Streptococcus detection by immunoassay with direct optical observation  (In-House)

## 2021-05-18 NOTE — PROGRESS NOTES
Jesse Espinal 1955     Office/Outpatient Visit    Visit Date: Wed, Jul 17, 2019 08:52 am    Provider: Quyen Farah N.P. (Assistant: Florina Baer MA)    Location: [facility]        Electronically signed by Quyen Farah N.P. on  07/17/2019 08:17:35 PM                             SUBJECTIVE:        CC:     Ms. Espinal is a 64 year old White female.  This is a follow-up visit.  check up;         HPI:         Ms. Espinal presents with hypercholesterolemia.  Current treatment includes Zocor.  Compliance with treatment has been good; she takes her medication as directed.  She denies experiencing any hypercholesterolemia related symptoms.          In regard to the hypertension, her current cardiac medication regimen includes a diuretic ( Lasix ), a beta-blocker ( Toprol-XL ), a calcium channel blocker ( Norvasc ), and an angiotensin receptor blocker ( Cozaar ).  She is tolerating the medication well without side effects.  Compliance with treatment has been good; she takes her medication as directed.          With regard to the type 2 diabetes with renal manifestations, compliance with treatment has been fair; she skips some medication doses due to forgetfulness.  Current meds include an oral hypoglycemic ( Glucophage XR ( 500mg qd ) ).  She reports home blood glucose readings have averaged fasting readings in the 150's mg/dL range.  Most recent lab results include Hemoglobin A1c:  5.9 (%) (01/16/2019), LDL:  99 (mg/dL) (01/16/2019), HDL:  37 (mg/dL) (01/16/2019), Triglycerides:  216 (mg/dL) (01/16/2019), Microalbuminuria:  1572.8 (mg/g creat) (07/12/2018), Dilated Eye Exam by date:  01/26/2018 (01/22/2018), Foot Exam (Annual):  01/16/2019 (01/16/2019).          PHQ-9 Depression Screening: Completed form scanned and in chart; Total Score 2 Alcohol Consumption Screening: Completed form scanned and in chart; Total Score 2     ROS:     CONSTITUTIONAL:  Negative for chills, fatigue,  fever, and weight change.      CARDIOVASCULAR:  Positive for pedal edema ( mild ).   Negative for chest pain.  nephrologist has her only take lasix if needed     RESPIRATORY:  Negative for cough, dyspnea, and hemoptysis.      NEUROLOGICAL:  Negative for dizziness, headaches, paresthesias, and weakness.          PMH/FMH/SH:     Last Reviewed on 2019 09:09 AM by Quyen Farah    Past Medical History:                 PAST MEDICAL HISTORY             GYNECOLOGICAL HISTORY:             CURRENT MEDICAL PROVIDERS:    Cardiologist: Dr Dailey         PREVENTIVE HEALTH MAINTENANCE             Hepatitis C Medicare Screening: was last done ; negative         Surgical History:         Appendectomy    Cholecystectomy    Hysterectomy      Coronary Artery Stent Placement: 13 and 14 LAD; Procedures: colonoscopy 13, diverticulosis and internal hemorrhoids heart cath , severe 3 vessel CAD     Procedures: cystoscopy 16         Family History:     Father:  at age 45; Cause of death was stroke;  liver disease     Mother:  at age 75; Cause of death was colon;  Coronary Artery Disease; Hypertension;  Breast Cancer; Colon Cancer;  Type 2 Diabetes     Brother(s): Coronary Artery Disease;  Lung Cancer     Sister(s): Coronary Artery Disease; Hyperlipidemia     Son(s): Hypertension         Social History:     Occupation: [employer] gone back to work part time     Marital Status:          Tobacco/Alcohol/Supplements:     Last Reviewed on 2019 08:56 AM by Florina Baer    Tobacco: She has a past history of cigarette smoking; quit date:  .              Immunizations:     Zostavax versus Placebo 2008     zzFluzone pf-quadrivalent 3 and up 2016     Fluzone Quadrivalent (3+ years) 2017     Fluzone Quadrivalent (3+ years) 2018     Zostavax (Zoster live) 2008         Allergies:     Last Reviewed on 2019 08:56 AM by Florina Baer    Effient:    Sulfas:     shell fish:        Current Medications:     Last Reviewed on 7/17/2019 08:56 AM by Florina Baer    One Touch Ultra Blue Test Strips  Reagent Strips check blood sugar 1-2 times daily DX: E11.9     Metformin HCl 500mg Tablets, Extended Release 1 tab q day with largest meal     Cozaar 100mg Tablet Take 1 tablet(s) by mouth daily     Isosorbide Mononitrate 30mg Tablets, Extended Release 1 tab daily     Lasix 20mg Tablets 1/2 - 1 tablet PO daily ONLY as needed     Metoprolol Succinate 50mg Tablets, Extended Release Take 1 tablet(s) by mouth daily     Norvasc 5mg Tablet 1 tab daily     Zocor 40mg Tablet Take 1/2  tablet(s) by mouth daily     Lancet   Lancet as directed  QD  Diag   E11.9   One Touch     Brilinta 60mg Tablet Take 1 tablet(s) by mouth bid     ASA  81 MG QD         OBJECTIVE:        Vitals:         Current: 7/17/2019 8:57:52 AM    Ht:  5 ft, 3.5 in;  Wt: 207.6 lbs;  BMI: 36.2    T: 98 F (oral);  BP: 122/81 mm Hg (left arm, sitting);  P: 68 bpm (left arm (BP Cuff), sitting);  sCr: 1.27 mg/dL;  GFR: 50.18        Exams:     PHYSICAL EXAM:     GENERAL: vital signs recorded - well developed, well nourished;  no apparent distress;     NECK: carotid exam reveals no bruits;     RESPIRATORY: normal respiratory rate and pattern with no distress; normal breath sounds with no rales, rhonchi, wheezes or rubs;     CARDIOVASCULAR: normal rate; rhythm is regular;  no systolic murmur; trace pedal edema;     PSYCHIATRIC:  appropriate affect and demeanor; normal speech pattern; grossly normal memory;         ASSESSMENT           272.0   E78.2  Hypercholesterolemia              DDx:     401.1   I10  Hypertension              DDx:     250.40   E11.22  Type 2 diabetes with renal manifestations              DDx:     V79.0   Z13.31  Screening for depression              DDx:         ORDERS:         Meds Prescribed:       Refill of: Zocor (Simvastatin) 40mg Tablet Take 1/2  tablet(s) by mouth daily  #90 (Ninety) tablet(s)  Refills: 1       Refill of: Cozaar (Losartan) 100mg Tablet Take 1 tablet(s) by mouth daily  #90 (Ninety) tablet(s) Refills: 1       Refill of: Lasix (Furosemide) 20mg Tablet 1/2 - 1 tablet PO daily ONLY as needed  #90 (Ninety) tablet(s) Refills: 1       Refill of: Metoprolol Succinate 50mg Tablets, Extended Release Take 1 tablet(s) by mouth daily  #90 (Ninety) tablet(s) Refills: 1       Refill of: Metformin HCl 500mg Tablets, Extended Release 1 tab q day with largest meal  #90 (Ninety) tablet(s) Refills: 1         Lab Orders:       APPTO  Appointment need  (In-House)         [id number]  DIAB - [facility] LIPID,CMP, A1C: 64407, 49813, 72835  (Send-Out)           Other Orders:         Depression screen negative  (In-House)           Negative EtOH screen  (In-House)                   PLAN:          Hypercholesterolemia         RECOMMENDATIONS given include: exercise and low cholesterol/low fat diet.      FOLLOW-UP: Schedule a follow-up visit in 6 months..   for Medicare Wellness Visit           Prescriptions:       Refill of: Zocor (Simvastatin) 40mg Tablet Take 1/2  tablet(s) by mouth daily  #90 (Ninety) tablet(s) Refills: 1           Orders:       APPTO  Appointment need  (In-House)            Hypertension           Prescriptions:       Refill of: Cozaar (Losartan) 100mg Tablet Take 1 tablet(s) by mouth daily  #90 (Ninety) tablet(s) Refills: 1       Refill of: Lasix (Furosemide) 20mg Tablet 1/2 - 1 tablet PO daily ONLY as needed  #90 (Ninety) tablet(s) Refills: 1       Refill of: Metoprolol Succinate 50mg Tablets, Extended Release Take 1 tablet(s) by mouth daily  #90 (Ninety) tablet(s) Refills: 1          Type 2 diabetes with renal manifestations sees nephrologist 9-2019 will send copy of her labs         RECOMMENDATIONS: to schedule eye exam, she will make her own appt, will send rx of rx after labs and to take rx daily.  LABORATORY:  Labs ordered to be performed today include Diabetes Panel 1; CMP, Lipid, A1C.             Prescriptions:       Refill of: Metformin HCl 500mg Tablets, Extended Release 1 tab q day with largest meal  #90 (Ninety) tablet(s) Refills: 1           Orders:       [id number]  DIAB - [facility] LIPID,CMP, A1C: 39300, 08602, 11725  (Send-Out)            Screening for depression     MIPS PHQ-9 Depression Screening: Completed form scanned and in chart; Total Score 2; Negative Depression Screen Negative alcohol screen           Orders:         Depression screen negative  (In-House)           Negative EtOH screen  (In-House)               Patient Recommendations:        For  Hypercholesterolemia:     Maintain a regular exercise program. Reduce the amount of cholesterol and saturated fat in your diet.  Schedule a follow-up visit in 6 months.                APPOINTMENT INFORMATION:        Monday Tuesday Wednesday Thursday Friday Saturday Sunday            Time:___________________AM  PM   Date:_____________________             CHARGE CAPTURE           **Please note: ICD descriptions below are intended for billing purposes only and may not represent clinical diagnoses**        Primary Diagnosis:         272.0 Hypercholesterolemia            E78.2    Mixed hyperlipidemia              Orders:          [id number]   Office/outpatient visit; established patient, level 4  (In-House)             APPTO   Appointment need  (In-House)           401.1 Hypertension            I10    Essential (primary) hypertension    250.40 Type 2 diabetes with renal manifestations            E11.22    Type 2 diabetes mellitus with diabetic chronic kidney disease    V79.0 Screening for depression            Z13.31    Encounter for screening for depression              Orders:             Depression screen negative  (In-House)                Negative EtOH screen  (In-House)

## 2021-05-18 NOTE — PROGRESS NOTES
Jesse Espinal  1955     Office/Outpatient Visit    Visit Date: Wed, Jan 22, 2020 12:59 pm    Provider: Quyen Farah N.P. (Assistant: Deirdre Oviedo, )    Location: [facility]        Electronically signed by Quyen Farah N.P. on  01/22/2020 02:13:23 PM                             Subjective:        CC: Ms. Espinal is a 64 year old White female.  This is a follow-up visit.  med refills; no longer taking lasix;         HPI:           Patient presents with essential (primary) hypertension.  Her current cardiac medication regimen includes a beta-blocker ( Toprol-XL ), a calcium channel blocker ( Norvasc ), and an angiotensin receptor blocker ( Cozaar ).  She did not bring her blood pressure diary, but says that typical readings show systolics in the 130s and diastolics in the 80s.  She is tolerating the medication well without side effects.  Compliance with treatment has been good; she takes her medication as directed.            With regard to the mixed hyperlipidemia, current treatment includes Zocor.  Compliance with treatment has been good; she takes her medication as directed.  She denies experiencing any hypercholesterolemia related symptoms.  Most recent lab tests include Microalbuminuria:  1572.8 (mg/g creat) (07/12/2018), Dilated Eye Exam by date:  01/26/2018 (01/22/2018), Foot Exam (Annual):  01/16/2019 (01/16/2019), TSH:  1.850 (mIU/L) (07/12/2018), Hemoglobin A1c:  6.1 (%) (07/17/2019), Glucose, Serum:  116 (mg/dL) (07/17/2019), ALT (SGPT):  22 (U/L) (07/17/2019), Total Cholesterol:  140 (mg/dL) (07/17/2019), HDL:  37 (mg/dL) (07/17/2019), Triglycerides:  143 (mg/dL) (07/17/2019), LDL:  74 (mg/dL) (07/17/2019).            In regard to the type 2 diabetes mellitus without complications, current meds include an oral hypoglycemic ( Glucophage ( 500mg qd ) ).  She reports home blood glucose readings have been fairly good, with average fasting glucoses running in the  120-150 mg/dL range.      ROS:     CONSTITUTIONAL:  Negative for chills, fatigue, fever, and weight change.      CARDIOVASCULAR:  Positive for pedal edema ( mild ).   Negative for chest pain or palpitations.  usually only has swelling in warm months     RESPIRATORY:  Negative for cough, dyspnea, and hemoptysis.      INTEGUMENTARY/BREAST:  Positive for performance of self breast exams ( but with irregular frequency ).   Negative for breast mass.      NEUROLOGICAL:  Negative for dizziness, headaches, paresthesias, and weakness.          Past Medical History / Family History / Social History:         Last Reviewed on 2020 01:18 PM by Quyen Farah    Past Medical History:                 PAST MEDICAL HISTORY             GYNECOLOGICAL HISTORY:             CURRENT MEDICAL PROVIDERS:    Cardiologist: Dr Dailey         PREVENTIVE HEALTH MAINTENANCE             Hepatitis C Medicare Screening: was last done ; negative         Surgical History:         Appendectomy    Cholecystectomy    Hysterectomy     Coronary Artery Stent Placement: 13 and 14 LAD; Procedures: colonoscopy 13, diverticulosis and internal hemorrhoids heart cath , severe 3 vessel CAD     Procedures: cystoscopy 16         Family History:     Father:  at age 45; Cause of death was stroke;  liver disease     Mother:  at age 75; Cause of death was colon;  Coronary Artery Disease; Hypertension;  Breast Cancer; Colon Cancer;  Type 2 Diabetes     Brother(s): 4 brother(s) total; 2 ;  Coronary Artery Disease;  Lung Cancer     Sister(s): 3 sister(s) total; 2 ;  Coronary Artery Disease; Hyperlipidemia; Neurological/Genetic Cerebrovascular Accident (  70 );  pneumonia     Son(s): 3 son(s) total;  Hypertension; Hyperlipidemia         Social History:     Occupation: [employer] gone back to work part time     Marital Status:      Children: 3 children         Tobacco/Alcohol/Supplements:     Last  Reviewed on 1/22/2020 01:04 PM by Deirdre Oviedo    Tobacco: She has a past history of cigarette smoking; quit date:  2009.          Immunizations:     Influenza, high dose seasonal (FLUZONE HIGH-DOSE 8823-3000) 11/13/2019    Zostavax versus Placebo 9/18/2008    zzFluzone pf-quadrivalent 3 and up 1/6/2016    Fluzone Quadrivalent (3+ years) 1/26/2017    Fluzone Quadrivalent (3+ years) 11/7/2018    Zostavax (Zoster live) 9/20/2008        Allergies:     Last Reviewed on 1/22/2020 01:04 PM by Deirdre Oviedo    shell fish:      Effient:      Sulfas:          Current Medications:     Last Reviewed on 1/22/2020 01:04 PM by Deirdre Oviedo    losartan 100 mg oral tablet [TAKE 1 TABLET BY MOUTH DAILY]    Zocor 40mg Tablet [Take 1/2  tablet(s) by mouth daily]    ASA  81 MG QD     Norvasc 5mg Tablet [1 tab daily]    Metoprolol Succinate 50 mg oral Tablet, Extended Release 24 hr [Take 1 tablet(s) by mouth daily]    Brilinta 60 mg oral tablet [Take 1 tablet(s) by mouth bid]    metFORMIN 500 mg oral Tablet, Extended Release 24 hr [TAKE 1 TABLET BY MOUTH EVERY DAY WITH LARGEST MEAL]    Lancet   Lancet [as directed  QD  Diag   E11.9   One Touch]    One Touch Ultra Blue Test Strips  Reagent Strips [check blood sugar 1-2 times daily DX: E11.9]    Isosorbide Mononitrate 30mg Tablets, Extended Release [1 tab daily]        Objective:        Vitals:         Current: 1/22/2020 1:07:26 PM    Ht:  5 ft, 3.5 in;  Wt: 210.2 lbs;  BMI: 36.7T: 98.2 F (oral);  BP: 136/86 mm Hg (right arm, sitting);  P: 69 bpm (right arm (BP Cuff), sitting);  sCr: 1.07 mg/dL;  GFR: 59.88        Exams:     PHYSICAL EXAM:     GENERAL: vital signs recorded - well developed, well nourished;  no apparent distress;     NECK: carotid exam reveals no bruits;     RESPIRATORY: normal respiratory rate and pattern with no distress; normal breath sounds with no rales, rhonchi, wheezes or rubs;     CARDIOVASCULAR: normal rate; rhythm is regular;  no systolic  murmur;    Peripheral Pulses: posterior tibial: equal bilaterally;  no edema;     LYMPHATIC: no axillary adenopathy;     BREAST/INTEGUMENT: BREASTS: breast exam is normal without masses, skin changes, or nipple discharge; skin of feet and toenails intact;     NEUROLOGIC: sensation intact to monofilament bilaterally;     PSYCHIATRIC:  appropriate affect and demeanor; normal speech pattern; grossly normal memory;         Assessment:         I10   Essential (primary) hypertension       E78.2   Mixed hyperlipidemia       E11.9   Type 2 diabetes mellitus without complications       I25.10   Atherosclerotic heart disease of native coronary artery without angina pectoris       N18.3   Chronic kidney disease, stage 3 (moderate)       Z12.31   Encounter for screening mammogram for malignant neoplasm of breast           ORDERS:         Meds Prescribed:       [Refilled] Zocor 40 mg oral tablet [Take 1/2  tablet(s) by mouth daily], #90 (ninety) tablets, Refills: 1 (one)       [Refilled] Metoprolol Succinate 50 mg oral Tablet, Extended Release 24 hr [Take 1 tablet(s) by mouth daily], #90 (ninety) tablets, Refills: 1 (one)       [Refilled] Norvasc 5 mg oral tablet [1 tab daily], #90 (ninety) tablets, Refills: 1 (one)         Radiology/Test Orders:       [id number]  Screening digital breast tomosynthesis bi  (Send-Out)              Lab Orders:       [id number]  DIAB - [facility] LIPID,CMP, A1C: 86168, 13154, 32645  (Send-Out)                      Plan:         Essential (primary) hypertension        RECOMMENDATIONS given include: perform routine monitoring of blood pressure with home blood pressure cuff.      FOLLOW-UP: Schedule a follow-up visit in 6 months.            Prescriptions:       [Refilled] Metoprolol Succinate 50 mg oral Tablet, Extended Release 24 hr [Take 1 tablet(s) by mouth daily], #90 (ninety) tablets, Refills: 1 (one)       [Refilled] Norvasc 5 mg oral tablet [1 tab daily], #90 (ninety) tablets, Refills: 1 (one)         Mixed  hyperlipidemia        RECOMMENDATIONS given include: low cholesterol/low fat diet.            Prescriptions:       [Refilled] Zocor 40 mg oral tablet [Take 1/2  tablet(s) by mouth daily], #90 (ninety) tablets, Refills: 1 (one)         Type 2 diabetes mellitus without complicationsto  make her own appt with [facility] /updated diabetes flow sheet    LABORATORY:  Labs ordered to be performed today include Diabetes Panel 1; CMP, Lipid, A1C.            Orders:       [id number]  DIAB - [facility] LIPID,CMP, A1C: 72698, 45687, 33009  (Send-Out)              Atherosclerotic heart disease of native coronary artery without angina pectorislast cardiology visit 3-2019, to see them again in 3-5-2020        Chronic kidney disease, stage 3 (moderate)last nephrology visit 9-2019, appt 3-13-20        Encounter for screening mammogram for malignant neoplasm of breast        RADIOLOGY:  I have ordered Mammogram Bilateral Screening 3D to be done today.            Orders:       [id number]  Screening digital breast tomosynthesis bi  (Send-Out)                  Patient Recommendations:        For  Essential (primary) hypertension:    Begin monitoring your blood pressure by brief nurse visits at our office, a home blood pressure monitor, or by checking on the machines in pharmacies or stores.  Keep a log of the readings.  Schedule a follow-up visit in 6 months.          For  Mixed hyperlipidemia:    Reduce the amount of cholesterol and saturated fat in your diet.              Charge Capture:         Primary Diagnosis:     I10  Essential (primary) hypertension           Orders:      [id number]  Office/outpatient visit; established patient, level 4  (In-House)              E78.2  Mixed hyperlipidemia     E11.9  Type 2 diabetes mellitus without complications     I25.10  Atherosclerotic heart disease of native coronary artery without angina pectoris     N18.3  Chronic kidney disease, stage 3 (moderate)     Z12.31  Encounter for screening mammogram for  malignant neoplasm of breast

## 2021-05-18 NOTE — PROGRESS NOTES
Jesse Espinal  1955     Office/Outpatient Visit    Visit Date: Wed, Jul 29, 2020 09:14 am    Provider: Quyen Farah N.P. (Assistant: Izabel Wang LPN)    Location: [facility]        Electronically signed by Quyen Farah N.P. on  07/29/2020 09:47:02 AM                             Subjective:        CC: Ms. Espinal is a 65 year old White female.  This is a follow-up visit.          HPI:           Type 2 diabetes details; current meds include an oral hypoglycemic ( Glucophage XR ( 500mg qd ) ).  She reports home blood glucose readings have averaged fasting readings in the 140's mg/dL range.  Most recent lab results include Hemoglobin A1c:  6.2 (%) (01/22/2020), LDL:  96 (mg/dL) (01/22/2020), HDL:  35 (mg/dL) (01/22/2020), Triglycerides:  185 (mg/dL) (01/22/2020), Microalbuminuria:  1572.8 (mg/g creat) (07/12/2018), Dilated Eye Exam by date:  01/26/2018 (01/22/2018), Foot Exam (Annual):  01/22/2020 (01/22/2020).            With regard to the essential (primary) hypertension, her current cardiac medication regimen includes a beta-blocker ( Toprol-XL ), a calcium channel blocker ( Norvasc ), and an angiotensin receptor blocker ( Cozaar ).  Ms. Espinal does not check her blood pressure other than at her clinic appointments.  She is tolerating the medication well without side effects.  Compliance with treatment has been good; she takes her medication as directed.            Concerning mixed hyperlipidemia, current treatment includes Crestor.  Cardiology changed her statin.            Dx with atherosclerotic heart disease of native coronary artery without angina pectoris; she is here today needs refill of Imdur, saw cardiology in March and had a ECHO 2 weeks ago.      ROS:     CONSTITUTIONAL:  Negative for fever.      CARDIOVASCULAR:  Negative for chest pain, palpitations, tachycardia, orthopnea, and edema.      RESPIRATORY:  Negative for recent cough and dyspnea.       NEUROLOGICAL:  Negative for dizziness, headaches, paresthesias, and weakness.          Past Medical History / Family History / Social History:         Last Reviewed on 2020 09:27 AM by Quyen Farah    Past Medical History:                 PAST MEDICAL HISTORY             GYNECOLOGICAL HISTORY:             CURRENT MEDICAL PROVIDERS:    Cardiologist: Dr Dailey         PREVENTIVE HEALTH MAINTENANCE             Hepatitis C Medicare Screening: was last done ; negative         Surgical History:         Appendectomy    Cholecystectomy    Hysterectomy     Coronary Artery Stent Placement: 13 and 14 LAD; Procedures: colonoscopy 13, diverticulosis and internal hemorrhoids heart cath , severe 3 vessel CAD     Procedures: cystoscopy 16         Family History:     Father:  at age 45; Cause of death was stroke;  liver disease     Mother:  at age 75; Cause of death was colon;  Coronary Artery Disease; Hypertension;  Breast Cancer; Colon Cancer;  Type 2 Diabetes     Brother(s): 4 brother(s) total; 2 ;  Coronary Artery Disease;  Lung Cancer     Sister(s): 3 sister(s) total; 2 ;  Coronary Artery Disease; Hyperlipidemia; Neurological/Genetic Cerebrovascular Accident (  70 );  pneumonia     Son(s): 3 son(s) total;  Hypertension; Hyperlipidemia         Social History:     Occupation: [employer] work      Marital Status:      Children: 3 children         Tobacco/Alcohol/Supplements:     Last Reviewed on 2020 09:18 AM by Izabel Wang    Tobacco: She has a past history of cigarette smoking; quit date:  .          Immunizations:     Influenza, high dose seasonal (FLUZONE HIGH-DOSE 3963-1155) 2019    Zostavax versus Placebo 2008    zzFluzone pf-quadrivalent 3 and up 2016    Fluzone Quadrivalent (3+ years) 2017    Fluzone Quadrivalent (3+ years) 2018    Zostavax (Zoster live) 2008        Allergies:     Last  Reviewed on 7/29/2020 09:17 AM by Izabel Wang    shell fish:      Effient:      Sulfas:          Current Medications:     Last Reviewed on 7/29/2020 09:18 AM by Izabel Wang    losartan 100 mg oral tablet [TAKE 1 TABLET BY MOUTH DAILY]    ASA  81 MG QD     amLODIPine 5 mg oral tablet [TAKE 1 TABLET BY MOUTH ONCE DAILY]    Brilinta 60 mg oral tablet [Take 1 tablet(s) by mouth bid]    metFORMIN 500 mg oral Tablet, Extended Release 24 hr [TAKE 1 TABLET BY MOUTH EVERY DAY WITH LARGEST MEAL]    Lancet   Lancet [as directed  QD  Diag   E11.9   One Touch]    OneTouch Ultra Blue Test Strip  [USE TO TEST BLOOD SUGAR 1-2 TIMES DAILY]    isosorbide mononitrate 30 mg oral Tablet, Extended Release 24 hr [TAKE 1 TABLET BY MOUTH DAILY]    metoprolol succinate 100 mg oral Tablet, Extended Release 24 hr [take 1 tablet (100 mg) by oral route once daily]    ROSUVASTATIN TAB 20MG  [1 tab daily]        Objective:        Vitals:         Current: 7/29/2020 9:21:51 AM    Ht:  5 ft, 3.5 in;  Wt: 211.4 lbs;  BMI: 36.9T: 97.8 F (oral);  BP: 154/84 mm Hg (right arm, sitting);  P: 66 bpm (right arm (BP Cuff), sitting);  sCr: 1.04 mg/dL;  GFR: 60.96        Exams:     PHYSICAL EXAM:     GENERAL: vital signs recorded - well developed, well nourished;  no apparent distress;     NECK: carotid exam reveals no bruits;     RESPIRATORY: normal respiratory rate and pattern with no distress; normal breath sounds with no rales, rhonchi, wheezes or rubs;     CARDIOVASCULAR: normal rate; rhythm is regular;  no systolic murmur; no edema;     PSYCHIATRIC:  appropriate affect and demeanor; normal speech pattern; grossly normal memory;         Assessment:         E11.22   Type 2 diabetes mellitus with diabetic chronic kidney disease       250.00   Type 2 diabetes       E11.9   Type 2 diabetes mellitus without complications       I10   Essential (primary) hypertension       E78.2   Mixed hyperlipidemia       I25.10   Atherosclerotic heart  disease of native coronary artery without angina pectoris           ORDERS:         Meds Prescribed:       [Refilled] amLODIPine 5 mg oral tablet [TAKE 1 TABLET BY MOUTH ONCE DAILY], #90 (ninety) tablets, Refills: 1 (one)       [Refilled] isosorbide mononitrate 30 mg oral Tablet, Extended Release 24 hr [TAKE 1 TABLET BY MOUTH DAILY], #90 (ninety) tablets, Refills: 1 (one)         Lab Orders:       [id number]  DIAB - [facility] LIPID,CMP, A1C: 67182, 52735, 26664  (Send-Out)                      Plan:         Type 2 diabetes mellitus with diabetic chronic kidney diseasereviewed diabetes flow sheet, she sees nephrology, dr maier, reviewed consult note 3-2020/ follow up due 9-2020/pt will make a follow up eye exam /does not need refill of metformin, will see what her labs indicate and see if her dose needs adjusting    LABORATORY:  Labs ordered to be performed today include Diabetes Panel 1; CMP, Lipid, A1C.            Orders:       [id number]  DIAB[address] [place] [address] LIPID,CMP, A1C: 12973, 71236, 34564  (Send-Out)              Type 2 diabetes mellitus without complicationsdid see Dr Morales /optometry        Essential (primary) hypertensiongave her a log sheet to check her BP        RECOMMENDATIONS given include: perform routine monitoring of blood pressure with home blood pressure cuff.      FOLLOW-UP: if BP elevated           Prescriptions:       [Refilled] amLODIPine 5 mg oral tablet [TAKE 1 TABLET BY MOUTH ONCE DAILY], #90 (ninety) tablets, Refills: 1 (one)         Atherosclerotic heart disease of native coronary artery without angina pectoriswill fwd to Dr Dailey /send for last consult note from 3-2020 and ECHO from earlier this month          Prescriptions:       [Refilled] isosorbide mononitrate 30 mg oral Tablet, Extended Release 24 hr [TAKE 1 TABLET BY MOUTH DAILY], #90 (ninety) tablets, Refills: 1 (one)             Patient Recommendations:        For  Essential (primary) hypertension:    Begin monitoring your blood pressure by brief  nurse visits at our office, a home blood pressure monitor, or by checking on the machines in pharmacies or stores.  Keep a log of the readings.              Charge Capture:         Primary Diagnosis:     E11.22  Type 2 diabetes mellitus with diabetic chronic kidney disease           Orders:      [id number]  Office/outpatient visit; established patient, level 4  (In-House)              250.00  Type 2 diabetes     E11.9  Type 2 diabetes mellitus without complications     I10  Essential (primary) hypertension     E78.2  Mixed hyperlipidemia     I25.10  Atherosclerotic heart disease of native coronary artery without angina pectoris

## 2021-05-18 NOTE — PROGRESS NOTES
Jesse Espinal 1955     Office/Outpatient Visit    Visit Date: Wed, Jan 16, 2019 10:13 am    Provider: Quyen Farah N.P. (Assistant: Law Kathleen)    Location: [facility]        Electronically signed by Quyen Farah N.P. on  01/16/2019 11:46:28 AM                             SUBJECTIVE:        CC:     Ms. Espinal is a 63 year old White female.  This is a follow-up visit.          HPI:         Patient presents with hypercholesterolemia.  Current treatment includes Zocor.  Compliance with treatment has been good; she takes her medication as directed and follows up as directed.  She denies experiencing any hypercholesterolemia related symptoms.  Most recent lab tests include Total Cholesterol:  152 (mg/dL) (07/12/2018), HDL:  40 (mg/dL) (07/12/2018), Triglycerides:  128 (mg/dL) (07/12/2018), LDL:  86 (mg/dL) (07/12/2018).          Concerning hypertension, her current cardiac medication regimen includes Cozaar, Lasix, Metoprolol Succinate, Norvasc.  She has not kept a blood pressure diary, but states that pressures have been well controlled.  She is tolerating the medication well without side effects.  Compliance with treatment has been good; she takes her medication as directed and follows up as directed.          With regard to the type 2 diabetes, current meds include an oral hypoglycemic ( Metformin HCL ).  Pt. states Urology recommends increasing Metformin dose. She reports home blood glucose readings of she checks her glucose 2 to 3 times daily; she checks her glucose 3 to 4 times daily.  BS averaging from 140s-150s. Most recent lab results include Hemoglobin A1c:  5.9 (%) (07/12/2018), Microalbuminuria:  1572.8 (mg/g creat) (07/12/2018), Weight (lb):  204.3 (07/12/2018).  Compliance with treatment has been good; she takes her medication as directed and is keeping a glucose diary.          Additionally, she presents with history of coronary artery disease.  currently, her  treatment regimen consists of Isosorbide Mononitrate, Brilinta, ASA 81mg.  Current symptoms include hematuria on urinalysis (side effect from Brilinta).  follow up with dr pepe in 2019     CKD     The symptom began 2 years ago.  Prior work-up has included labwork ( Abnormal results: kidney function slightly elevated ).  Sees Dr. Celaya, last saw in , last visit note is from , will send for this.     ROS:     CONSTITUTIONAL:  Negative for chills, fatigue, fever, and weight change.      CARDIOVASCULAR:  Negative for chest pain, palpitations, tachycardia, orthopnea, and edema.      RESPIRATORY:  Negative for cough, dyspnea, and hemoptysis.      INTEGUMENTARY/BREAST:  Negative for atypical mole(s) and rash.      NEUROLOGICAL:  Negative for dizziness, headaches, paresthesias, and weakness.          PMH/FMH/SH:     Last Reviewed on 2019 10:53 AM by Quyen Farah    Past Medical History:                 PAST MEDICAL HISTORY             GYNECOLOGICAL HISTORY:             CURRENT MEDICAL PROVIDERS:    Cardiologist: Dr Pepe         PREVENTIVE HEALTH MAINTENANCE             Hepatitis C Medicare Screening: was last done ; negative         Surgical History:         Appendectomy    Cholecystectomy    Hysterectomy      Coronary Artery Stent Placement: 13 and 14 LAD; Procedures: colonoscopy 13, diverticulosis and internal hemorrhoids heart cath , severe 3 vessel CAD     Procedures: cystoscopy 16         Family History:     Father:  at age 45; Cause of death was stroke;  liver disease     Mother:  at age 75; Cause of death was colon;  Coronary Artery Disease; Hypertension;  Breast Cancer; Colon Cancer;  Type 2 Diabetes     Brother(s): Coronary Artery Disease;  Lung Cancer     Sister(s): Coronary Artery Disease; Hyperlipidemia     Son(s): Hypertension         Social History:     Occupation: feng gone back to work part time     Marital Status:           Tobacco/Alcohol/Supplements:     Last Reviewed on 1/16/2019 10:53 AM by Quyen Farah    Tobacco: She has a past history of cigarette smoking; quit date:  2009.              Immunizations:     Zostavax versus Placebo 9/18/2008     zzFluzone pf-quadrivalent 3 and up 1/6/2016     Fluzone Quadrivalent (3+ years) 1/26/2017     Fluzone Quadrivalent (3+ years) 11/7/2018     Zostavax (Zoster live) 9/20/2008         Allergies:     Last Reviewed on 1/16/2019 10:53 AM by Quyen Farah    Effient:    Sulfas:    shell fish:        Current Medications:     Last Reviewed on 1/16/2019 10:53 AM by Quyen Farah    Cozaar 100mg Tablet Take 1 tablet(s) by mouth daily     Lasix 20mg Tablets 1/2 - 1 tablet PO daily ONLY as needed     Metoprolol Succinate 50mg Tablets, Extended Release Take 1 tablet(s) by mouth daily     Lancet   Lancet as directed  QD  Diag   E11.9   One Touch     One Touch Ultra Blue Test Strips  Reagent Strips check blood sugar 1-2 times daily DX: E11.9     Isosorbide Mononitrate 30mg Tablets, Extended Release 1 tab daily     Metformin HCl 500mg Tablets, Extended Release 1 tab q day with largest meal     Norvasc 5mg Tablet 1 tab daily     Zocor 40mg Tablet Take 1/2  tablet(s) by mouth daily     Brilinta 60mg Tablet Take 1 tablet(s) by mouth bid     ASA  81 MG QD         OBJECTIVE:        Vitals:         Current: 1/16/2019 10:17:56 AM    Ht:  5 ft, 3.5 in;  Wt: 207.2 lbs;  BMI: 36.1    T: 98.2 F (oral);  BP: 143/58 mm Hg (right arm, sitting);  P: 89 bpm (right arm (BP Cuff), sitting);  sCr: 1.24 mg/dL;  GFR: 52.02        Exams:     PHYSICAL EXAM:     GENERAL: vital signs recorded - well developed, well nourished;  no apparent distress;     NECK: carotid exam reveals no bruits;     RESPIRATORY: normal respiratory rate and pattern with no distress; normal breath sounds with no rales, rhonchi, wheezes or rubs;     CARDIOVASCULAR: normal rate; rhythm is regular;  no systolic murmur; no edema;      BREAST/INTEGUMENT: bilateral toenails trimmed, bilateral feet free of open sores/wounds/cuts;     NEUROLOGIC: sensation intact to monofilament bilaterally;     PSYCHIATRIC:  appropriate affect and demeanor; normal speech pattern; grossly normal memory;     Left foot exam    Skin is intact without sores or ulcers    Right foot exam    Skin is intact without sores or ulcers         ASSESSMENT           272.0   E78.2  Hypercholesterolemia              DDx:     401.1   I10  Hypertension              DDx:     250.40   E11.22  Type 2 diabetes with renal manifestations              DDx:     414.00   I25.10  Coronary artery disease              DDx:     585.3   N18.3  Chronic kidney disease, Stage III (moderate)              DDx:         ORDERS:         Meds Prescribed:       Refill of: Zocor (Simvastatin) 40mg Tablet Take 1/2  tablet(s) by mouth daily  #90 (Ninety) tablet(s) Refills: 1       Refill of: Cozaar (Losartan) 100mg Tablet Take 1 tablet(s) by mouth daily  #90 (Ninety) tablet(s) Refills: 1       Refill of: Lasix (Furosemide) 20mg Tablet 1/2 - 1 tablet PO daily ONLY as needed  #90 (Ninety) tablet(s) Refills: 1       Refill of: Metoprolol Succinate 50mg Tablets, Extended Release Take 1 tablet(s) by mouth daily  #90 (Ninety) tablet(s) Refills: 1       Refill of: Norvasc (Amlodipine ) 5mg Tablet 1 tab daily  #90 (Ninety) tablet(s) Refills: 1       Refill of: Isosorbide Mononitrate 30mg Tablets, Extended Release 1 tab daily  #90 (Ninety) tablet(s) Refills: 1         Lab Orders:       [id number]  DIAB1 - [facility] LIPID,CMP, A1C: 08365, 75189, 57757  (Send-Out)         [id number]  BDUA - [facility] Urinalysis, automated, with micro  (Send-Out)           Other Orders:       2028F  Foot examination performed (includes examination through visual inspection, sensory exam with monofi  (In-House)           Calculated BMI above the upper parameter and a follow-up plan was documented in the medical record  (In-House)                   PLAN:           Hypercholesterolemia reviewed labs from , reviewed last colonoscopy and mammogram. Pt. due for colonoscopy in 2023, pt. past due for mammogram, but wishes to wait for referral until Medicare takes affect.      MIPS     BMI Elevated - Follow-Up Plan: She was provided education on weight loss strategies     RECOMMENDATIONS given include: exercise, low cholesterol/low fat diet, and weight loss.      FOLLOW-UP: pt. sent today for fasting labs, due for routine labs and med refills in 6mos           Prescriptions:       Refill of: Zocor (Simvastatin) 40mg Tablet Take 1/2  tablet(s) by mouth daily  #90 (Ninety) tablet(s) Refills: 1           Orders:         Calculated BMI above the upper parameter and a follow-up plan was documented in the medical record  (In-House)            Hypertension         RECOMMENDATIONS given include: perform routine monitoring of blood pressure with home blood pressure cuff, exercise, weight loss, and Advised about free BP checks on the last Saturday of each month.      FOLLOW-UP: Schedule a follow-up visit in 6 months.            Prescriptions:       Refill of: Cozaar (Losartan) 100mg Tablet Take 1 tablet(s) by mouth daily  #90 (Ninety) tablet(s) Refills: 1       Refill of: Lasix (Furosemide) 20mg Tablet 1/2 - 1 tablet PO daily ONLY as needed  #90 (Ninety) tablet(s) Refills: 1       Refill of: Metoprolol Succinate 50mg Tablets, Extended Release Take 1 tablet(s) by mouth daily  #90 (Ninety) tablet(s) Refills: 1       Refill of: Norvasc (Amlodipine ) 5mg Tablet 1 tab daily  #90 (Ninety) tablet(s) Refills: 1          Type 2 diabetes with renal manifestations Pt. due this month for yearly diabetic eye exam. Pt. states she will make this appt. on her own. Will consider increasing Metformin dose once labs resulted., reviewed and updated diabetes flow sheet         RECOMMENDATIONS: instructed in use of home glucose checks and weight loss.      FOLLOW-UP: sent today for fasting labs,  pt. due for routine labs and med refills in 6mo LABORATORY:  Labs ordered to be performed today include Diabetes Panel 1; CMP, Lipid, A1C and urinalysis with micro.            Orders:       [id number]  DIAB1 - [facility] LIPID,CMP, A1C: 26887, 75004, 20974  (Send-Out)         38029  BDUAM - [facility] Urinalysis, automated, with micro  (Send-Out)             Patient Education Handouts:       Diabetes (Foot and Skin Problems)           Coronary artery disease         FOLLOW-UP:    - with cardiologist as advised ()     RECOMMENDATIONS:    - monitor symptoms    -  take medications as  prescribed           Prescriptions:       Refill of: Isosorbide Mononitrate 30mg Tablets, Extended Release 1 tab daily  #90 (Ninety) tablet(s) Refills: 1          Chronic kidney disease, Stage III (moderate) send for nephrology notes             Other Orders:       2028F  Foot examination performed (includes examination through visual inspection, sensory exam with monofi  (In-House)           Patient Recommendations:        For  Hypercholesterolemia:     Maintain a regular exercise program. Reduce the amount of cholesterol and saturated fat in your diet. Try to lose some weight; even modest weight reduction can improve your blood pressure.          For  Hypertension:     Begin monitoring your blood pressure by brief nurse visits at our office, a home blood pressure monitor, or by checking on the machines in pharmacies or stores.  Keep a log of the readings. Maintain a regular exercise program. Try to lose some weight; even modest weight reduction can improve your blood pressure.  Schedule a follow-up visit in 6 months.          For  Coronary artery disease:         FOLLOW-UP: - with cardiologist as advised ()     RECOMMENDATIONS:    - Monitor symptoms    -             CHARGE CAPTURE           **Please note: ICD descriptions below are intended for billing purposes only and may not represent clinical diagnoses**        Primary Diagnosis:          272.0 Hypercholesterolemia            E78.2    Mixed hyperlipidemia              Orders:          [id number]   Office/outpatient visit; established patient, level 4  (In-House)                Calculated BMI above the upper parameter and a follow-up plan was documented in the medical record  (In-House)           401.1 Hypertension            I10    Essential (primary) hypertension    250.40 Type 2 diabetes with renal manifestations            E11.22    Type 2 diabetes mellitus with diabetic chronic kidney disease    414.00 Coronary artery disease            I25.10    Atherosclerotic heart disease of native coronary artery without angina pectoris    585.3 Chronic kidney disease, Stage III (moderate)            N18.3    Chronic kidney disease, stage 3 (moderate)        Other Orders:           2028F   Foot examination performed (includes examination through visual inspection, sensory exam with monofi  (In-House)

## 2021-05-18 NOTE — PROGRESS NOTES
Jesse Espinal 1955     Office/Outpatient Visit    Visit Date: Thu, Jul 12, 2018 10:16 am    Provider: Quyen Farah N.P. (Assistant: Mendy Delcid MA)    Location: [facility]        Electronically signed by Quyen Farah N.P. on  07/12/2018 11:10:45 AM                             SUBJECTIVE:        CC:     Ms. Espinal is a 63 year old White female.  follow up for medication refills;         HPI:         Ms. Espinal presents with hypercholesterolemia.  Current treatment includes Zocor.  Compliance with treatment has been good; she takes her medication as directed.  She denies experiencing any hypercholesterolemia related symptoms.          In regard to the hypertension, her current cardiac medication regimen includes a diuretic ( Lasix ), a beta-blocker ( Toprol-XL ), a calcium channel blocker ( Norvasc ), and an angiotensin receptor blocker ( Cozaar ).  She is tolerating the medication well without side effects.  Compliance with treatment has been good; she takes her medication as directed.  only taking lasix prn, she did see nephrologist and has a follow up in 8-2018         Additionally, she presents with history of type 2 diabetes.  current meds include an oral hypoglycemic ( Glucophage XR ( 500mg qd ) ).  She reports home blood glucose readings have been fairly good, with average fasting glucoses running in the 120-150 mg/dL range.  Most recent lab results include Hemoglobin A1c:  5.8 (%) (01/22/2018), LDL:  93 (mg/dL) (01/22/2018), HDL:  40 (mg/dL) (01/22/2018), Triglycerides:  165 (mg/dL) (01/22/2018), Microalbuminuria:  891.0 (mg/g creat) (01/22/2018), Dilated Eye Exam by date:  01/26/2018 (01/22/2018), Foot Exam (Annual):  01/22/2018 (01/22/2018).      ROS:     CONSTITUTIONAL:  Negative for chills, fatigue, fever, and weight change.      CARDIOVASCULAR:  Negative for chest pain, palpitations, tachycardia, orthopnea, and edema.      RESPIRATORY:  Negative for cough,  dyspnea, and hemoptysis.      INTEGUMENTARY/BREAST:  Positive for sore place on bottom of left foot.      NEUROLOGICAL:  Negative for dizziness, headaches, paresthesias, and weakness.      ENDOCRINE:  Positive for hair loss (thinning and fingernails thinning).          PMH/FMH/SH:     Last Reviewed on 2018 10:29 AM by Quyen Farah    Past Medical History:             GYNECOLOGICAL HISTORY:             PREVENTIVE HEALTH MAINTENANCE             Hepatitis C Medicare Screening: was last done ; negative         Surgical History:         Appendectomy    Cholecystectomy    Hysterectomy      Coronary Artery Stent Placement: 13 and 14 LAD; Procedures: colonoscopy 13, diverticulosis and internal hemorrhoids heart cath , severe 3 vessel CAD     Procedures: cystoscopy 16         Family History:     Father:  at age 45; Cause of death was stroke;  liver disease     Mother:  at age 75; Cause of death was colon;  Coronary Artery Disease; Hypertension;  Breast Cancer; Colon Cancer;  Type 2 Diabetes     Brother(s): Coronary Artery Disease;  Lung Cancer     Sister(s): Coronary Artery Disease; Hyperlipidemia     Son(s): Hypertension         Social History:     Occupation: feng gone back to work part time     Marital Status:          Tobacco/Alcohol/Supplements:     Last Reviewed on 2018 10:22 AM by Mendy Delcid    Tobacco: She has a past history of cigarette smoking; quit date:  .              Immunizations:     Zostavax versus Placebo 2008     zzFluzone pf-quadrivalent 3 and up 2016     Fluzone Quadrivalent (3+ years) 2017     Zostavax (Zoster) 2008         Allergies:     Last Reviewed on 2018 10:20 AM by Mendy Delcid    Effient:    Sulfas:    shell fish:        Current Medications:     Last Reviewed on 2018 10:21 AM by Mendy Delcid    One Touch Ultra Blue Test Strips  Reagent Strips check blood sugar 1-2 times daily DX:  E11.9     Cozaar 100mg Tablet Take 1 tablet(s) by mouth daily     Lasix 20mg Tablets 1/2 - 1 tablet PO daily ONLY as needed     Metformin HCl 500mg Tablets, Extended Release 1 tab q day with largest meal     Metoprolol Succinate 50mg Tablets, Extended Release Take 1 tablet(s) by mouth daily     Norvasc 5mg Tablet 1 tab daily     Zocor 40mg Tablet Take 1/2  tablet(s) by mouth daily     Lancet   Lancet as directed  QD  Diag   E11.9   One Touch     Brilinta 60mg Tablet Take 1 tablet(s) by mouth bid     ASA  81 MG QD         OBJECTIVE:        Vitals:         Current: 7/12/2018 10:19:30 AM    Ht:  5 ft, 3.5 in;  Wt: 204.3 lbs;  BMI: 35.6    T: 97.5 F (oral);  BP: 166/68 mm Hg (left arm, sitting);  P: 66 bpm (left arm (BP Cuff), sitting);  sCr: 1.05 mg/dL;  GFR: 61.07        Repeat:     10:24:23 AM     BP:   162/88mm Hg (left arm, sitting)     10:47:28 AM     BP:   160/72mm Hg (left arm, sitting)         Exams:     PHYSICAL EXAM:     GENERAL: vital signs recorded - well developed, well nourished;  no apparent distress;     NECK: carotid exam reveals no bruits;     RESPIRATORY: normal respiratory rate and pattern with no distress; normal breath sounds with no rales, rhonchi, wheezes or rubs;     CARDIOVASCULAR: normal rate; rhythm is regular;  no systolic murmur; no edema;     BREAST/INTEGUMENT: wart(s) located on Left foot one /lateral mid aspect     PSYCHIATRIC:  appropriate affect and demeanor; normal speech pattern; grossly normal memory;         ASSESSMENT           272.0   E78.5  Hypercholesterolemia              DDx:     401.1   I10  Hypertension              DDx:     250.00   E11.9  Type 2 diabetes              DDx:     078.12   B07.0  Plantar wart              DDx:     704.02   L65.9  Hair thinning              DDx:         ORDERS:         Meds Prescribed:       Refill of: Zocor (Simvastatin) 40mg Tablet Take 1/2  tablet(s) by mouth daily  #90 (Ninety) tablet(s) Refills: 1       Refill of: Cozaar (Losartan) 100mg  Tablet Take 1 tablet(s) by mouth daily  #90 (Ninety) tablet(s) Refills: 1       Refill of: Lasix (Furosemide) 20mg Tablet 1/2 - 1 tablet PO daily ONLY as needed  #90 (Ninety) tablet(s) Refills: 1       Refill of: Metoprolol Succinate 50mg Tablets, Extended Release Take 1 tablet(s) by mouth daily  #90 (Ninety) tablet(s) Refills: 1       Refill of: Norvasc (Amlodipine ) 5mg Tablet 1 tab daily  #90 (Ninety) tablet(s) Refills: 1       Refill of: Metformin HCl 500mg Tablets, Extended Release 1 tab q day with largest meal  #90 (Ninety) tablet(s) Refills: 1         Lab Orders:       [id number]  DIAB2 - [facility] CMP A1C LIPID AND MICRO ALBUM CR RATIO: [id number]  (Send-Out)         36977  TSH - [facility] TSH  (Send-Out)         86170  BDCBC - [facility] CBC with 3 part diff  (Send-Out)           Procedures Ordered:       REFER  Referral to Specialist or Other Facility  (Send-Out)                   PLAN:          Hypercholesterolemia          FOLLOW-UP: Schedule a follow-up visit in 6 months.            Prescriptions:       Refill of: Zocor (Simvastatin) 40mg Tablet Take 1/2  tablet(s) by mouth daily  #90 (Ninety) tablet(s) Refills: 1          Hypertension may need to take diuretic daily /keep appt with neprhology         RECOMMENDATIONS given include: perform routine monitoring of blood pressure with home blood pressure cuff.      FOLLOW-UP: recheck BP           Prescriptions:       Refill of: Cozaar (Losartan) 100mg Tablet Take 1 tablet(s) by mouth daily  #90 (Ninety) tablet(s) Refills: 1       Refill of: Lasix (Furosemide) 20mg Tablet 1/2 - 1 tablet PO daily ONLY as needed  #90 (Ninety) tablet(s) Refills: 1       Refill of: Metoprolol Succinate 50mg Tablets, Extended Release Take 1 tablet(s) by mouth daily  #90 (Ninety) tablet(s) Refills: 1       Refill of: Norvasc (Amlodipine ) 5mg Tablet 1 tab daily  #90 (Ninety) tablet(s) Refills: 1           Patient Education Handouts:       Hepatitis A           Type 2 diabetes      LABORATORY:  Labs ordered to be performed today include Diabetes Panel 2;CMP, A1C, Lipid, Microalbumin:Creatinine Ratio.            Prescriptions:       Refill of: Metformin HCl 500mg Tablets, Extended Release 1 tab q day with largest meal  #90 (Ninety) tablet(s) Refills: 1           Orders:       [id number]  DIAB2 - [facility] CMP A1C LIPID AND MICRO ALBUM CR RATIO: [id number]  (Send-Out)            Plantar wart         REFERRALS:  Referral initiated to a podiatrist ( [facility] ).            Orders:       REFER  Referral to Specialist or Other Facility  (Send-Out)            Hair thinning     LABORATORY:  Labs ordered to be performed today include CBC and TSH.            Orders:       [id number]  TSH - [facility] TSH  (Send-Out)         58868  BDCBC - [facility] CBC with 3 part diff  (Send-Out)               Patient Recommendations:        For  Hypercholesterolemia:     Schedule a follow-up visit in 6 months.          For  Hypertension:     Begin monitoring your blood pressure by brief nurse visits at our office, a home blood pressure monitor, or by checking on the machines in pharmacies or stores.  Keep a log of the readings.              CHARGE CAPTURE           **Please note: ICD descriptions below are intended for billing purposes only and may not represent clinical diagnoses**        Primary Diagnosis:         272.0 Hypercholesterolemia            E78.5    Hyperlipidemia, unspecified              Orders:          [id number]   Office/outpatient visit; established patient, level 4  (In-House)           401.1 Hypertension            I10    Essential (primary) hypertension    250.00 Type 2 diabetes            E11.9    Type 2 diabetes mellitus without complications    078.12 Plantar wart            B07.0    Plantar wart    704.02 Hair thinning            L65.9    Nonscarring hair loss, unspecified        ADDENDUMS:      ____________________________________    Addendum: 07/13/2018 11:21 AM -           Visit Note Faxed to:        User Entered Recipient; Number [phone]

## 2021-05-18 NOTE — PROGRESS NOTES
Jesse Espinal  1955     Office/Outpatient Visit    Visit Date: Mon, Mar 29, 2021 09:49 am    Provider: Quyen Farah N.P. (Assistant: Quyen Ledbetter LPN)    Location: [facility]        Electronically signed by Quyen Farah N.P. on  2021 11:47:56 AM                             Subjective:        CC: Ms. Espinal is a 66 year old White female.  Discuss medications;         HPI:           Ms. Espinal presents with type 2 diabetes mellitus without complications.  Current meds include an oral hypoglycemic ( Glucophage XR ( 500mg qd ) ).  She reports home blood glucose readings have been fairly good, with average fasting glucoses running in the 120-150 mg/dL range.  Most recent lab results include Weight (lb):  217.6 (2021), Hemoglobin A1c:  6.3 (%) (2021), LDL:  61 (mg/dL) (2021), HDL:  41 (mg/dL) (2021), Triglycerides:  145 (mg/dL) (2021), Microalbuminuria:  1572.8 (mg/g creat) (2018), Dilated Eye Exam by date:  2018 (2018), Foot Exam (Annual):  2020 (2020).  She was seeing Dr Celaya and when she recently went for an appt he was not there and then was contacted by a different neph, he added Jardiance.  She does not know why.      ROS:     CONSTITUTIONAL:  Negative for fever.      CARDIOVASCULAR:  Negative for chest pain, palpitations, tachycardia, orthopnea, and edema.      RESPIRATORY:  Negative for recent cough and dyspnea.      INTEGUMENTARY/BREAST:  Positive for rash (poison weed rash to right forearm bibi r).      NEUROLOGICAL:  Negative for dizziness, headaches, paresthesias, and weakness.          Past Medical History / Family History / Social History:         Last Reviewed on 3/29/2021 10:18 AM by Quyen Farah    Past Medical History:                 PAST MEDICAL HISTORY             GYNECOLOGICAL HISTORY:             CURRENT MEDICAL PROVIDERS:    Cardiologist: Dr Dailey         PREVENTIVE  HEALTH MAINTENANCE             Hepatitis C Medicare Screening: was last done ; negative         Surgical History:         Appendectomy    Cholecystectomy    Hysterectomy     Coronary Artery Stent Placement: 13 and 14 LAD; Procedures: colonoscopy 13, diverticulosis and internal hemorrhoids heart cath , severe 3 vessel CAD     Procedures: cystoscopy 16         Family History:     Father:  at age 45; Cause of death was stroke;  liver disease     Mother:  at age 75; Cause of death was colon;  Coronary Artery Disease; Hypertension;  Breast Cancer; Colon Cancer;  Type 2 Diabetes     Brother(s): 4 brother(s) total; 2 ;  Coronary Artery Disease;  Lung Cancer     Sister(s): 3 sister(s) total; 2 ;  Coronary Artery Disease; Hyperlipidemia; Neurological/Genetic Cerebrovascular Accident (  70 );  pneumonia     Son(s): 3 son(s) total;  Hypertension; Hyperlipidemia         Social History:     Occupation: [employer] off work      Marital Status:      Children: 3 children         Tobacco/Alcohol/Supplements:     Last Reviewed on 3/29/2021 09:52 AM by Quyen Ledbetter    Tobacco: She has a past history of cigarette smoking; quit date:  .          Immunizations: has had covid vaccines    Influenza, high dose seasonal (FLUZONE HIGH-DOSE 4160-0993) 2019    influenza, high-dose, quadrivalent (FLUZONE HIGH-DOSE QUAD ) 10/1/2020    Zostavax versus Placebo 2008    zzFluzone pf-quadrivalent 3 and up 2016    Fluzone Quadrivalent (3+ years) 2017    Fluzone Quadrivalent (3+ years) 2018    Zostavax (Zoster live) 2008        Allergies:     Last Reviewed on 3/29/2021 09:52 AM by Quyen Ledbetter    shell fish:      Effient:      Sulfas:          Current Medications:     Last Reviewed on 3/29/2021 09:54 AM by Quyen Ledbetter    losartan 100 mg oral tablet [TAKE 1 TABLET BY MOUTH DAILY]    ASA  81 MG QD     amLODIPine 5 mg oral tablet [TAKE 1  TABLET BY MOUTH ONCE DAILY]    Brilinta 60 mg oral tablet [Take 1 tablet(s) by mouth bid]    metFORMIN 500 mg oral Tablet, Extended Release 24 hr [TAKE 1 TABLET BY MOUTH EVERY DAY WITH LARGEST MEAL]    Lancet   Lancet [as directed  QD  Diag   E11.9   One Touch]    OneTouch Ultra Blue Test Strip  [USE TO TEST BLOOD SUGAR 1-2 TIMES DAILY]    isosorbide mononitrate 30 mg oral Tablet, Extended Release 24 hr [TAKE 1 TABLET BY MOUTH EVERY DAY]    rosuvastatin 20 mg oral tablet [take 1 tablet (20 mg) by oral route once daily]    metoprolol succinate 100 mg oral Tablet, Extended Release 24 hr [take 1 tablet (100 mg) by oral route once daily]    Medrol (Emeka) 4 mg oral Tablet, Dose Pack [take by oral route as directed per package instructions]        Objective:        Vitals:         Current: 3/29/2021 9:56:22 AM    Ht:  5 ft, 3.5 in;  Wt: 218.4 lbs;  BMI: 38.1T: 97.2 F (temporal);  BP: 132/84 mm Hg (left arm, sitting);  P: 65 bpm (left arm (BP Cuff), sitting);  sCr: 1.19 mg/dL;  GFR: 53.31        Exams:     PHYSICAL EXAM:     GENERAL: vital signs recorded - well developed, well nourished;  no apparent distress;     NECK: carotid exam reveals no bruits;     RESPIRATORY: normal respiratory rate and pattern with no distress; normal breath sounds with no rales, rhonchi, wheezes or rubs;     CARDIOVASCULAR: normal rate; rhythm is regular;  no systolic murmur; no edema;     BREAST/INTEGUMENT: macular rash to right medial forearm;     PSYCHIATRIC:  appropriate affect and demeanor; normal speech pattern; grossly normal memory;         Assessment:         E11.8   Type 2 diabetes mellitus with unspecified complications           Plan:         Type 2 diabetes mellitus with unspecified complicationsreviewed last labs in 1-2021; needs eye exam, marian Khalil, had to reschedule, she will get that scheduled /will find out what happened with  dr maier, and sent for the tele health visit/ Obey aguilar  (okay to hold jardiance for now )             Charge Capture:         Primary Diagnosis:     E11.8  Type 2 diabetes mellitus with unspecified complications           Orders:      [id number]  Office/outpatient visit; established patient, level 3  (In-House)

## 2021-07-01 VITALS
TEMPERATURE: 97.9 F | BODY MASS INDEX: 35.53 KG/M2 | SYSTOLIC BLOOD PRESSURE: 145 MMHG | WEIGHT: 208.1 LBS | HEIGHT: 64 IN | HEART RATE: 81 BPM | DIASTOLIC BLOOD PRESSURE: 72 MMHG

## 2021-07-01 VITALS
HEART RATE: 68 BPM | BODY MASS INDEX: 35.44 KG/M2 | SYSTOLIC BLOOD PRESSURE: 122 MMHG | HEIGHT: 64 IN | TEMPERATURE: 98 F | WEIGHT: 207.6 LBS | DIASTOLIC BLOOD PRESSURE: 81 MMHG

## 2021-07-01 VITALS
TEMPERATURE: 97.5 F | HEART RATE: 66 BPM | SYSTOLIC BLOOD PRESSURE: 160 MMHG | WEIGHT: 204.3 LBS | HEIGHT: 64 IN | DIASTOLIC BLOOD PRESSURE: 72 MMHG | BODY MASS INDEX: 34.88 KG/M2

## 2021-07-01 VITALS
BODY MASS INDEX: 35.37 KG/M2 | HEART RATE: 89 BPM | DIASTOLIC BLOOD PRESSURE: 58 MMHG | HEIGHT: 64 IN | SYSTOLIC BLOOD PRESSURE: 143 MMHG | TEMPERATURE: 98.2 F | WEIGHT: 207.2 LBS

## 2021-07-02 ENCOUNTER — TELEPHONE (OUTPATIENT)
Dept: CARDIOLOGY | Facility: CLINIC | Age: 66
End: 2021-07-02

## 2021-07-02 ENCOUNTER — HOSPITAL ENCOUNTER (OUTPATIENT)
Dept: CARDIOLOGY | Facility: HOSPITAL | Age: 66
Discharge: HOME | End: 2021-07-02
Admitting: INTERNAL MEDICINE

## 2021-07-02 VITALS
HEIGHT: 64 IN | SYSTOLIC BLOOD PRESSURE: 132 MMHG | BODY MASS INDEX: 37.28 KG/M2 | WEIGHT: 218.4 LBS | HEART RATE: 65 BPM | DIASTOLIC BLOOD PRESSURE: 84 MMHG | TEMPERATURE: 97.2 F

## 2021-07-02 VITALS
BODY MASS INDEX: 35.89 KG/M2 | SYSTOLIC BLOOD PRESSURE: 136 MMHG | TEMPERATURE: 98.2 F | HEART RATE: 69 BPM | WEIGHT: 210.2 LBS | DIASTOLIC BLOOD PRESSURE: 86 MMHG | HEIGHT: 64 IN

## 2021-07-02 VITALS
DIASTOLIC BLOOD PRESSURE: 84 MMHG | TEMPERATURE: 97.8 F | WEIGHT: 211.4 LBS | HEIGHT: 64 IN | BODY MASS INDEX: 36.09 KG/M2 | SYSTOLIC BLOOD PRESSURE: 154 MMHG | HEART RATE: 66 BPM

## 2021-07-02 VITALS
DIASTOLIC BLOOD PRESSURE: 67 MMHG | WEIGHT: 217.6 LBS | HEART RATE: 76 BPM | TEMPERATURE: 97.1 F | SYSTOLIC BLOOD PRESSURE: 151 MMHG | BODY MASS INDEX: 37.15 KG/M2 | HEIGHT: 64 IN

## 2021-07-02 VITALS — WEIGHT: 210 LBS | BODY MASS INDEX: 35.85 KG/M2 | HEIGHT: 64 IN

## 2021-07-02 DIAGNOSIS — E11.8 TYPE 2 DIABETES MELLITUS WITH COMPLICATION, WITHOUT LONG-TERM CURRENT USE OF INSULIN (HCC): ICD-10-CM

## 2021-07-02 DIAGNOSIS — I10 ESSENTIAL HYPERTENSION: ICD-10-CM

## 2021-07-02 DIAGNOSIS — Z95.5 HISTORY OF CORONARY ARTERY STENT PLACEMENT: ICD-10-CM

## 2021-07-02 LAB
BH CV NUCLEAR PRIOR STUDY: 3
BH CV REST NUCLEAR ISOTOPE DOSE: 10.8 MCI
BH CV STRESS BP STAGE 1: NORMAL
BH CV STRESS BP STAGE 2: NORMAL
BH CV STRESS DURATION MIN STAGE 1: 3
BH CV STRESS DURATION MIN STAGE 2: 3
BH CV STRESS DURATION SEC STAGE 1: 0
BH CV STRESS DURATION SEC STAGE 2: 0
BH CV STRESS GRADE STAGE 1: 10
BH CV STRESS GRADE STAGE 2: 12
BH CV STRESS HR STAGE 1: 120
BH CV STRESS HR STAGE 2: 135
BH CV STRESS METS STAGE 1: 5
BH CV STRESS METS STAGE 2: 7.5
BH CV STRESS NUCLEAR ISOTOPE DOSE: 35.3 MCI
BH CV STRESS PROTOCOL 1: NORMAL
BH CV STRESS RECOVERY BP: NORMAL MMHG
BH CV STRESS RECOVERY HR: 97 BPM
BH CV STRESS SPEED STAGE 1: 1.7
BH CV STRESS SPEED STAGE 2: 2.5
BH CV STRESS STAGE 1: 1
BH CV STRESS STAGE 2: 2
LV EF NUC BP: 70 %
MAXIMAL PREDICTED HEART RATE: 153 BPM
PERCENT MAX PREDICTED HR: 88.24 %
STRESS BASELINE BP: NORMAL MMHG
STRESS BASELINE HR: 86 BPM
STRESS PERCENT HR: 104 %
STRESS POST ESTIMATED WORKLOAD: 7 METS
STRESS POST EXERCISE DUR MIN: 6 MIN
STRESS POST EXERCISE DUR SEC: 0 SEC
STRESS POST PEAK BP: NORMAL MMHG
STRESS POST PEAK HR: 135 BPM
STRESS TARGET HR: 130 BPM

## 2021-07-02 PROCEDURE — 93017 CV STRESS TEST TRACING ONLY: CPT

## 2021-07-02 PROCEDURE — 0 TECHNETIUM TETROFOSMIN KIT: Performed by: INTERNAL MEDICINE

## 2021-07-02 PROCEDURE — 25010000002 REGADENOSON 0.4 MG/5ML SOLUTION: Performed by: INTERNAL MEDICINE

## 2021-07-02 PROCEDURE — A9502 TC99M TETROFOSMIN: HCPCS | Performed by: INTERNAL MEDICINE

## 2021-07-02 PROCEDURE — 78452 HT MUSCLE IMAGE SPECT MULT: CPT | Performed by: INTERNAL MEDICINE

## 2021-07-02 PROCEDURE — 93018 CV STRESS TEST I&R ONLY: CPT | Performed by: INTERNAL MEDICINE

## 2021-07-02 PROCEDURE — 78452 HT MUSCLE IMAGE SPECT MULT: CPT

## 2021-07-02 PROCEDURE — 93016 CV STRESS TEST SUPVJ ONLY: CPT | Performed by: INTERNAL MEDICINE

## 2021-07-02 RX ADMIN — TETROFOSMIN 1 DOSE: 1.38 INJECTION, POWDER, LYOPHILIZED, FOR SOLUTION INTRAVENOUS at 08:04

## 2021-07-02 RX ADMIN — REGADENOSON 0.4 MG: 0.08 INJECTION, SOLUTION INTRAVENOUS at 09:38

## 2021-07-02 RX ADMIN — TETROFOSMIN 1 DOSE: 1.38 INJECTION, POWDER, LYOPHILIZED, FOR SOLUTION INTRAVENOUS at 09:37

## 2021-07-08 RX ORDER — TICAGRELOR 60 MG/1
TABLET ORAL
Qty: 180 TABLET | Refills: 2 | Status: SHIPPED | OUTPATIENT
Start: 2021-07-08 | End: 2022-03-04

## 2021-07-22 RX ORDER — METFORMIN HYDROCHLORIDE 500 MG/1
TABLET, EXTENDED RELEASE ORAL
Qty: 90 TABLET | Refills: 0 | Status: SHIPPED | OUTPATIENT
Start: 2021-07-22 | End: 2021-07-27 | Stop reason: SDUPTHER

## 2021-07-22 RX ORDER — LOSARTAN POTASSIUM 100 MG/1
TABLET ORAL
Qty: 90 TABLET | Refills: 0 | Status: SHIPPED | OUTPATIENT
Start: 2021-07-22 | End: 2021-07-27 | Stop reason: SDUPTHER

## 2021-07-22 RX ORDER — METOPROLOL SUCCINATE 100 MG/1
TABLET, EXTENDED RELEASE ORAL
Qty: 90 TABLET | Refills: 3 | Status: SHIPPED | OUTPATIENT
Start: 2021-07-22 | End: 2021-07-27 | Stop reason: SDUPTHER

## 2021-07-22 NOTE — TELEPHONE ENCOUNTER
LAST OV: 3/29/21  NEXT OV: 7/27/21  LAST LAB: 3/4/21    PLEASE SIGN OR ADVISE    Lab Results   Component Value Date    BUN 14 03/04/2021    CREATININE 1.08 (H) 03/04/2021    BCR 13 03/04/2021    K 4.4 03/04/2021    CO2 23 03/04/2021    CALCIUM 10.0 03/04/2021    ALBUMIN 4.5 03/04/2021    LABIL2 1.4 03/04/2021    AST 23 03/04/2021    ALT 27 03/04/2021     Microalbumin / Creatinine Urine Ratio -  Order: [id number]  Status:  Final result   Visible to patient:  No (not released)   Next appt:  07/27/2021 at 08:45 AM in [facility] ([facility])        0 Result Notes  Component   Ref Range & Units 4 mo ago   Creatinine Urine, Random   10.0 - 300.0 mg/dL 105.6    Microalbumin   0.0 - 20.0 mg/L 650.4High     Microalbumin/Creatinine Ratio   0.0 - 35.0 mg/g 615.9High           Specimen Collected: 03/04/21 10:52 Last Resulted: 03/04/21 15:42           Contains abnormal data Diabetes Panel 1  Order: [id number]  Status:  Final result   Visible to patient:  No (not released)   Next appt:  07/27/2021 at 08:45 AM in [facility] ([facility])        0 Result Notes  Component   Ref Range & Units 5 mo ago   Glucose   65 - 99 mg/dL 90    BUN   5 - 25 mg/dL 15    Creatinine   0.50 - 0.90 mg/dL 1.19High     BUN/Creatinine Ratio   6 - 20  13    GFR   >60 mL/min/ 47Low     Comment: Interpretative Data:   ------------------------------------   STAGE                  GFR   Stage 1                90 mL/min or greater   Stage 2                60-89 mL/min   Stage 3                30-59 mL/min   Stage 4                15-29 mL/min   Value <60 mL/min for 3 or more months is defined as CKD.    Sodium   135 - 147 mmol/L 139    Potassium   3.5 - 5.3 mmol/L 4.6    Chloride   99 - 111 mmol/L 105    CO2   22 - 32 mmol/L 22    Anion Gap   8 - 19 mmol/L 17    OSMOLALITY CALC   273 - 304 288    Total Protein   6.3 - 8.2 g/dL 8.0    Comment: If Patient is receiving dextran as a blood volume expander   result may show a  potential interference.    Albumin   3.5 - 5.0 g/dL 4.3    Globulin   2.0 - 3.5 g/dL 3.7High     A/G Ratio   1.4 - 2.6  1.2Low     Calcium   8.7 - 10.4 mg/dL 10.1    Alkaline Phosphatase   43 - 160 U/L 79    ALT (SGPT)   10 - 40 U/L 22    AST (SGOT)   15 - 50 U/L 23    Total Bilirubin   0.20 - 1.30 mg/dL 0.39    Triglycerides   40 - 150 mg/dL 145    Comment: <150 mg/dL-Normal   150-199 mg/dL-Borderline High   200-499 mg/dL-High   >500 mg/dL- Very High    Total Cholesterol   107 - 200 mg/dL 131    Comment: <200 mg/dL-Desirable   200-239 mg/dL-Borderline High   >240 mg/dL-High   >500 mg/dL-Very High    HDL Cholesterol   40 - 60 mg/dL 41    Comment: <40 mg/dL-Low   >60 mg/dL- Desirable    Chol/HDL Ratio   3.0 - 6.0 NA 3.2    LDL Cholesterol    70 - 100 mg/dL 61Low     Comment: Recommended  LDL Levels   <100 mg/dL-Optimal   100-129 mg/dL-Near Optimal/above optimal   130-159 mg/dL-Borderline High   160-189 mg/dL-High   >190 mg/dL-Very High    VLDL Cholesterol   5 - 37 mg/dL 29    Mean Bld Glu Estim.   mg/dL 134    Hemoglobin A1C   3.5 - 5.7 % 6.3High     Comment: **Interpretation**   <7% in Controlled Diabetic Patients.   Assay Range 3.4-18.2%   ADA 2010 Standards of Medical Care in Diabetes suggest using   a cut point of >= 6.5% for diagnosis of diabetes and an A1C   range of 5.7%-6.4% as a category of increased risk for future   diabetes.          Specimen Collected: 01/26/21 15:47 Last Resulted: 01/27/21 00:47        Lab Flowsheet     Order Details     View Encounter     Lab and Collection Details     Routing     Result History              Other Results from 1/26/2021    Contains abnormal data CBC & Differential    Status:  Final result   Visible to patient:  No (not released)   Next appt:  07/27/2021 at 08:45 AM in Family Medicine (Quyen Farah, [facility])  Order: [id number]        0 Result Notes  Component   Ref Range & Units 5 mo ago   WBC   4.80 - 10.80 10*3/uL 7.45    RBC   4.20 - 5.40 10*6/uL 4.61     Hemoglobin   12.0 - 16.0 g/dL 13.5    Hematocrit   37.0 - 47.0 % 41.4    MCV   81.0 - 99.0 fL 89.8    MCH   27.0 - 31.0 pg 29.3    MCHC   33.0 - 37.0 32.6Low     RDW   11.7 - 14.4 % 13.9    Platelets   130 - 400 10*3/uL 244    MPV   9.4 - 12.3 fL 10.5    Neutrophil Rel %   30.0 - 85.0 % 59.2    Lymphocyte Rel %   20.0 - 45.0 % 26.6    Monocyte Rel %   3.0 - 10.0 % 8.7    Eosinophil Rel %   0.0 - 7.0 % 3.8    Basophil Rel %   0.0 - 3.0 % 1.2    Neutrophils Absolute   2.00 - 8.00 10*3/uL 4.41    Lymphocytes Absolute   1.00 - 5.00 10*3/uL 1.98    Monocytes Absolute   0.20 - 1.20 10*3/uL 0.65    Eosinophils Absolute   0.00 - 0.70 10*3/uL 0.28    Basophils Absolute   0.00 - 0.20 10*3/uL 0.09    Immature Granulocyte Rel %   0.0 - 1.8 % 0.5    Abs Imm Gran   0.00 - 0.20 10*3/uL 0.04    RDW-SD   36.4 - 46.3 fL 45.7    NRBC   0.0 - 0.7 % 0.00          Specimen Collected: 01/26/21 15:47 Last Resulted: 01/26/21 19:12

## 2021-07-27 ENCOUNTER — OFFICE VISIT (OUTPATIENT)
Dept: FAMILY MEDICINE CLINIC | Age: 66
End: 2021-07-27

## 2021-07-27 ENCOUNTER — LAB (OUTPATIENT)
Dept: LAB | Facility: HOSPITAL | Age: 66
End: 2021-07-27

## 2021-07-27 VITALS
WEIGHT: 214 LBS | HEART RATE: 60 BPM | TEMPERATURE: 98 F | SYSTOLIC BLOOD PRESSURE: 128 MMHG | DIASTOLIC BLOOD PRESSURE: 65 MMHG | BODY MASS INDEX: 36.73 KG/M2

## 2021-07-27 DIAGNOSIS — N18.31 CHRONIC RENAL IMPAIRMENT, STAGE 3A (HCC): ICD-10-CM

## 2021-07-27 DIAGNOSIS — M79.89 SWELLING OF BOTH LOWER EXTREMITIES: ICD-10-CM

## 2021-07-27 DIAGNOSIS — I25.10 CORONARY ARTERY DISEASE INVOLVING NATIVE CORONARY ARTERY OF NATIVE HEART WITHOUT ANGINA PECTORIS: ICD-10-CM

## 2021-07-27 DIAGNOSIS — E11.8 TYPE 2 DIABETES MELLITUS WITH COMPLICATION, WITHOUT LONG-TERM CURRENT USE OF INSULIN (HCC): ICD-10-CM

## 2021-07-27 DIAGNOSIS — I10 ESSENTIAL HYPERTENSION: ICD-10-CM

## 2021-07-27 DIAGNOSIS — E78.49 OTHER HYPERLIPIDEMIA: Primary | ICD-10-CM

## 2021-07-27 PROBLEM — N18.5 CKD (CHRONIC KIDNEY DISEASE), STAGE V (HCC): Status: ACTIVE | Noted: 2021-07-27

## 2021-07-27 LAB
ALBUMIN SERPL-MCNC: 4.2 G/DL (ref 3.5–5.2)
ALBUMIN/GLOB SERPL: 1.2 G/DL
ALP SERPL-CCNC: 69 U/L (ref 39–117)
ALT SERPL W P-5'-P-CCNC: 20 U/L (ref 1–33)
ANION GAP SERPL CALCULATED.3IONS-SCNC: 9.9 MMOL/L (ref 5–15)
AST SERPL-CCNC: 19 U/L (ref 1–32)
BILIRUB SERPL-MCNC: 0.5 MG/DL (ref 0–1.2)
BUN SERPL-MCNC: 18 MG/DL (ref 8–23)
BUN/CREAT SERPL: 15.3 (ref 7–25)
CALCIUM SPEC-SCNC: 10.1 MG/DL (ref 8.6–10.5)
CHLORIDE SERPL-SCNC: 106 MMOL/L (ref 98–107)
CHOLEST SERPL-MCNC: 126 MG/DL (ref 0–200)
CO2 SERPL-SCNC: 23.1 MMOL/L (ref 22–29)
CREAT SERPL-MCNC: 1.18 MG/DL (ref 0.57–1)
GFR SERPL CREATININE-BSD FRML MDRD: 46 ML/MIN/1.73
GLOBULIN UR ELPH-MCNC: 3.6 GM/DL
GLUCOSE SERPL-MCNC: 117 MG/DL (ref 65–99)
HBA1C MFR BLD: 6.29 % (ref 4.8–5.6)
HDLC SERPL-MCNC: 35 MG/DL (ref 40–60)
LDLC SERPL CALC-MCNC: 69 MG/DL (ref 0–100)
LDLC/HDLC SERPL: 1.89 {RATIO}
POTASSIUM SERPL-SCNC: 4.6 MMOL/L (ref 3.5–5.2)
PROT SERPL-MCNC: 7.8 G/DL (ref 6–8.5)
SODIUM SERPL-SCNC: 139 MMOL/L (ref 136–145)
TRIGL SERPL-MCNC: 124 MG/DL (ref 0–150)
VLDLC SERPL-MCNC: 22 MG/DL (ref 5–40)

## 2021-07-27 PROCEDURE — 83036 HEMOGLOBIN GLYCOSYLATED A1C: CPT

## 2021-07-27 PROCEDURE — 80053 COMPREHEN METABOLIC PANEL: CPT

## 2021-07-27 PROCEDURE — 36415 COLL VENOUS BLD VENIPUNCTURE: CPT

## 2021-07-27 PROCEDURE — 80061 LIPID PANEL: CPT

## 2021-07-27 PROCEDURE — 99214 OFFICE O/P EST MOD 30 MIN: CPT | Performed by: NURSE PRACTITIONER

## 2021-07-27 RX ORDER — LOSARTAN POTASSIUM 100 MG/1
100 TABLET ORAL DAILY
Qty: 90 TABLET | Refills: 1 | Status: SHIPPED | OUTPATIENT
Start: 2021-07-27 | End: 2021-10-20

## 2021-07-27 RX ORDER — METOPROLOL SUCCINATE 100 MG/1
100 TABLET, EXTENDED RELEASE ORAL DAILY
Qty: 90 TABLET | Refills: 1 | Status: SHIPPED | OUTPATIENT
Start: 2021-07-27 | End: 2022-04-25

## 2021-07-27 RX ORDER — SIMVASTATIN 20 MG
20 TABLET ORAL NIGHTLY
COMMUNITY
End: 2021-07-27

## 2021-07-27 RX ORDER — METFORMIN HYDROCHLORIDE 500 MG/1
500 TABLET, EXTENDED RELEASE ORAL
Qty: 90 TABLET | Refills: 1 | Status: SHIPPED | OUTPATIENT
Start: 2021-07-27 | End: 2022-01-18

## 2021-07-27 RX ORDER — AMLODIPINE BESYLATE 5 MG/1
5 TABLET ORAL DAILY
Qty: 90 TABLET | Refills: 1 | Status: SHIPPED | OUTPATIENT
Start: 2021-07-27 | End: 2021-10-27

## 2021-07-27 RX ORDER — ROSUVASTATIN CALCIUM 20 MG/1
20 TABLET, COATED ORAL DAILY
Qty: 90 TABLET | Refills: 2 | Status: SHIPPED | OUTPATIENT
Start: 2021-07-27 | End: 2022-09-13

## 2021-07-27 NOTE — ASSESSMENT & PLAN NOTE
More symptomatic than left, she was on lasix, checking labs, discussed elevation, less salt, compression hose

## 2021-07-27 NOTE — ASSESSMENT & PLAN NOTE
Checking labs, jardiance was too expensive, waiting on labs, she uses [facility] but her rx may be better covered at her mail order pharam[facility], will check with her after labs

## 2021-07-27 NOTE — ASSESSMENT & PLAN NOTE
She never heard back from us on her neph appt with a new one, reviewed EMD for [facility]'s last note, 3-15-21

## 2021-07-27 NOTE — ASSESSMENT & PLAN NOTE
She reports having a recent stress test and has had an ECHO earlier this year, sees Dr Starr, follow up as directed

## 2021-07-27 NOTE — PROGRESS NOTES
Jesse Espinal presents to [facility].    Chief Complaint:  Hypertension (also has other issues she wants to discuss. Declined MCW for today.), Hyperlipidemia, and Diabetes         History of Present Illness:  Hypertension:  Current medication:losartan, norvasc   Tolerating Medication:yes   Checking BP at home and it is : systolic  and diastolic 59-87  Needs refills: Yes to [facility]   Lab Results       Component                Value               Date                       BUN                      14                  03/04/2021                 CREATININE               1.08 (H)            03/04/2021                 BCR                      13                  03/04/2021                 K                        4.4                 03/04/2021                 CO2                      23                  03/04/2021                 CALCIUM                  10.0                03/04/2021                 ALBUMIN                  4.5                 03/04/2021                 LABIL2                   1.4                 03/04/2021                 AST                      23                  03/04/2021                 ALT                      27                  03/04/2021              Hyperlipidemia  Current medication: crestor  Tolerating medication: Yes  Needs Refill: Yes    Lab Results       Component                Value               Date                       CHOL                     120                 06/10/2020                 CHLPL                    131                 01/26/2021                 TRIG                     145                 01/26/2021                 HDL                      41                  01/26/2021                 LDL                      61 (L)              01/26/2021                Diabetes:  Current medication : metformin, was on jardiance but too expensive, was seeing kidney specialist but [facility], he closed or left the practice    Tolerating medication: Yes  Last eye exam : May 2021   Last foot exam 1-2020  At home BS ranges: running okay   Lab Results       Component                Value               Date                       HGBA1C                   6.3 (H)             01/26/2021              PMH: fell in shower in May 25, 2021, has had a stress test /she retired from [employer] 2016, but worked part time until 2-2021 / now fully retired           Review of Systems:  Review of Systems   Constitutional: Negative for fatigue and fever.   Cardiovascular: Positive for leg swelling (left lower leg , for six weeks ).   Neurological: Negative for numbness.          Vital Signs:   /65 (BP Location: Left arm, Patient Position: Sitting, Cuff Size: Large Adult)   Pulse 60   Temp 98 °F (36.7 °C) (Oral)   Wt 97.1 kg (214 lb)   BMI 36.73 kg/m²       Physical Exam:  Physical Exam  Constitutional:       Appearance: Normal appearance.   Neck:      Vascular: No carotid bruit.   Cardiovascular:      Rate and Rhythm: Normal rate and regular rhythm.      Pulses:           Posterior tibial pulses are 2+ on the right side and 2+ on the left side.      Heart sounds: No murmur heard.     Pulmonary:      Effort: No respiratory distress.      Breath sounds: Normal breath sounds.   Musculoskeletal:         General: No swelling.      Right lower leg: Edema present.      Left lower leg: Edema (left greater than right ) present.   Feet:      Right foot:      Protective Sensation: 3 sites tested. 3 sites sensed.      Skin integrity: Skin integrity normal. No ulcer or blister.      Toenail Condition: Right toenails are normal.      Left foot:      Protective Sensation: 3 sites tested. 3 sites sensed.      Skin integrity: Skin integrity normal. No ulcer or blister.      Toenail Condition: Left toenails are normal.      Comments:      Neurological:      Mental Status: She is alert.   Psychiatric:         Mood and Affect: Mood normal.         Thought Content:  Thought content normal.         Result Review    The following data was reviewed by: [facility] on 07/27/2021:  CMP    CMP 9/16/20 1/26/21 3/4/21   Glucose 122 (A) 90 119 (A)   BUN 18 15 14   Creatinine 1.24 (A) 1.19 (A) 1.08 (A)   Sodium 139 139 136   Potassium 4.3 4.6 4.4   Chloride 105 105 103   Calcium 9.8 10.1 10.0   Albumin 4.3 4.3 4.5   Total Bilirubin 0.36 0.39 0.48   Alkaline Phosphatase 75 79 80   AST (SGOT) 19 23 23   ALT (SGPT) 21 22 27   (A) Abnormal value           reviewed   Results for orders placed or performed during the hospital encounter of 07/02/21   Stress Test With Myocardial Perfusion One Day   Result Value Ref Range    Target HR (85%) 130 bpm    Max. Pred. HR (100%) 153 bpm    BH CV REST NUCLEAR ISOTOPE DOSE 10.8 mCi    BH CV STRESS NUCLEAR ISOTOPE DOSE 35.3 mCi    BH CV STRESS PROTOCOL 1 Lamine     Stage 1 1     HR Stage 1 120     BP Stage 1 152/80     Duration Min Stage 1 3     Duration Sec Stage 1 0     Grade Stage 1 10     Speed Stage 1 1.7     BH CV STRESS METS STAGE 1 5     Stage 2 2     HR Stage 2 135     BP Stage 2 168/84     Duration Min Stage 2 3     Duration Sec Stage 2 0     Grade Stage 2 12     Speed Stage 2 2.5     BH CV STRESS METS STAGE 2 7.5     Baseline HR 86 bpm    Baseline /82 mmHg    Peak  bpm    Percent Max Pred HR 88.24 %    Percent Target  %    Peak /84 mmHg    Recovery HR 97 bpm    Recovery /78 mmHg    Exercise duration (min) 6 min    Exercise duration (sec) 0 sec    Estimated workload 7.0 METS    Nuclear Prior Study 3     Nuc Stress EF 70 %               Assessment and Plan:       Diagnoses and all orders for this visit:    1. Other hyperlipidemia (Primary)  Assessment & Plan:  Continue current rx and efforts with diet and regular exercise     Orders:  -     rosuvastatin (CRESTOR) 20 MG tablet; Take 1 tablet by mouth Daily.  Dispense: 90 tablet; Refill: 2    2. Type 2 diabetes mellitus with complication, without long-term  current use of insulin (CMS/[facility])  Assessment & Plan:  Checking labs, jardiance was too expensive, waiting on labs, [facility] uses [facility] but her rx may be better covered at her mail order [facility], will check with her after labs     Orders:  -     Comprehensive Metabolic Panel; Future  -     Lipid Panel; Future  -     Hemoglobin A1c; Future  -     metFORMIN ER (GLUCOPHAGE-XR) 500 MG 24 hr tablet; Take 1 tablet by mouth Daily With Breakfast.  Dispense: 90 tablet; Refill: 1    3. Essential hypertension  Assessment & Plan:  Continue current rx's, see neph    Orders:  -     metoprolol succinate XL (TOPROL-XL) 100 MG 24 hr tablet; Take 1 tablet by mouth Daily.  Dispense: 90 tablet; Refill: 1  -     losartan (COZAAR) 100 MG tablet; Take 1 tablet by mouth Daily.  Dispense: 90 tablet; Refill: 1  -     amLODIPine (NORVASC) 5 MG tablet; Take 1 tablet by mouth Daily.  Dispense: 90 tablet; Refill: 1    4. Coronary artery disease involving native coronary artery of native heart without angina pectoris  Assessment & Plan:  She reports having a recent stress test and has had an ECHO earlier this year, sees Dr Starr, follow up as directed       5. Chronic renal impairment, stage 3a (CMS/[facility])  Assessment & Plan:  She never heard back from us on her neph appt with a new one, reviewed EMD for Belia's last note, 3-15-21     Orders:  -     Ambulatory Referral to Nephrology    6. Swelling of both lower extremities  Assessment & Plan:  More symptomatic than left, she was on lasix, checking labs, discussed elevation, less salt, compression hose           Follow Up   Return for followup pending lab results.  Patient was given instructions and counseling regarding her condition or for health maintenance advice. Please see specific information pulled into the AVS if appropriate.

## 2021-08-04 ENCOUNTER — TELEPHONE (OUTPATIENT)
Dept: FAMILY MEDICINE CLINIC | Age: 66
End: 2021-08-04

## 2021-08-04 NOTE — TELEPHONE ENCOUNTER
HUB TO READ    TALKED TO PT IN REGARDS TO SCHEDULING A APPT WITH SPECIALIST. TOLD PT WE WOULD BE CALLING HER AND SITTING UP A TIME.

## 2021-08-11 RX ORDER — ISOSORBIDE MONONITRATE 30 MG/1
TABLET, EXTENDED RELEASE ORAL
Qty: 90 TABLET | Refills: 0 | Status: SHIPPED | OUTPATIENT
Start: 2021-08-11 | End: 2021-08-13

## 2021-08-11 NOTE — TELEPHONE ENCOUNTER
LV:3/29/21 has follow up 8/26/21   Lab Results   Component Value Date    GLUCOSE 117 (H) 07/27/2021    BUN 18 07/27/2021    CREATININE 1.18 (H) 07/27/2021    EGFRIFNONA 46 (L) 07/27/2021    BCR 15.3 07/27/2021    K 4.6 07/27/2021    CO2 23.1 07/27/2021    CALCIUM 10.1 07/27/2021    ALBUMIN 4.20 07/27/2021    LABIL2 1.4 03/04/2021    AST 19 07/27/2021    ALT 20 07/27/2021

## 2021-08-13 RX ORDER — ISOSORBIDE MONONITRATE 30 MG/1
30 TABLET, EXTENDED RELEASE ORAL DAILY
Qty: 90 TABLET | Refills: 1 | Status: SHIPPED | OUTPATIENT
Start: 2021-08-13 | End: 2022-01-31

## 2021-08-13 NOTE — TELEPHONE ENCOUNTER
Pt is requesting refills of Isosorbide mono ER 30mg. Was sent for #90 with no refills. Approved one refill.

## 2021-08-25 ENCOUNTER — TELEPHONE (OUTPATIENT)
Dept: FAMILY MEDICINE CLINIC | Age: 66
End: 2021-08-25

## 2021-08-25 NOTE — TELEPHONE ENCOUNTER
Caller: Jesse Espinal    Relationship to patient: Self    Best call back number: [phone]     Chief complaint: PATIENT STATED THAT HER LEFT FOOT AND LEG FELL THROUGH A DOCK AND HER LEFT LEG HAS A CUT ON HER CALF. PATIENT STATED THAT HER LEFT LEG IS SWOLLEN, BURNING, RED AND WARM TO TOUCH. PATIENT WAS INFORMED TO GO TO THE ER OR CALL 911. PATIENT STATED THAT SHE WAS GOING TO BE SEEN IN THE HOSPITAL.     Patient directed to call 911 or go to their nearest emergency room.     Patient verbalized understanding: [x] Yes  [] No  If no, why?

## 2021-08-26 NOTE — TELEPHONE ENCOUNTER
Pt said she didn't go to the hospital because of all the covid people there. She said her daughter n law is a nurse and she cleaned as been taking care of it. She said its better.

## 2021-10-20 DIAGNOSIS — I10 ESSENTIAL HYPERTENSION: ICD-10-CM

## 2021-10-20 RX ORDER — LOSARTAN POTASSIUM 100 MG/1
100 TABLET ORAL DAILY
Qty: 90 TABLET | Refills: 1 | Status: SHIPPED | OUTPATIENT
Start: 2021-10-20 | End: 2022-10-11

## 2021-10-27 DIAGNOSIS — I10 ESSENTIAL HYPERTENSION: ICD-10-CM

## 2021-10-27 RX ORDER — AMLODIPINE BESYLATE 5 MG/1
5 TABLET ORAL DAILY
Qty: 90 TABLET | Refills: 1 | Status: SHIPPED | OUTPATIENT
Start: 2021-10-27 | End: 2021-12-08 | Stop reason: SDUPTHER

## 2021-11-04 ENCOUNTER — OFFICE VISIT (OUTPATIENT)
Dept: PODIATRY | Facility: CLINIC | Age: 66
End: 2021-11-04

## 2021-11-04 ENCOUNTER — CLINICAL SUPPORT (OUTPATIENT)
Dept: FAMILY MEDICINE CLINIC | Age: 66
End: 2021-11-04

## 2021-11-04 VITALS
DIASTOLIC BLOOD PRESSURE: 95 MMHG | HEART RATE: 113 BPM | WEIGHT: 215 LBS | HEIGHT: 64 IN | SYSTOLIC BLOOD PRESSURE: 104 MMHG | BODY MASS INDEX: 36.7 KG/M2 | RESPIRATION RATE: 16 BRPM | TEMPERATURE: 97.5 F

## 2021-11-04 DIAGNOSIS — Z23 NEED FOR INFLUENZA VACCINATION: Primary | ICD-10-CM

## 2021-11-04 DIAGNOSIS — E11.8 DIABETIC FOOT (HCC): Primary | ICD-10-CM

## 2021-11-04 DIAGNOSIS — E11.8 TYPE 2 DIABETES MELLITUS WITH COMPLICATION, WITHOUT LONG-TERM CURRENT USE OF INSULIN (HCC): Primary | ICD-10-CM

## 2021-11-04 PROCEDURE — 99212 OFFICE O/P EST SF 10 MIN: CPT | Performed by: PODIATRIST

## 2021-11-04 PROCEDURE — 90662 IIV NO PRSV INCREASED AG IM: CPT | Performed by: FAMILY MEDICINE

## 2021-11-04 PROCEDURE — G8404 LOW EXTEMITY NEUR EXAM DOCUM: HCPCS | Performed by: PODIATRIST

## 2021-11-04 PROCEDURE — G0008 ADMIN INFLUENZA VIRUS VAC: HCPCS | Performed by: FAMILY MEDICINE

## 2021-11-04 NOTE — TELEPHONE ENCOUNTER
Rx Refill Note  Requested Prescriptions     Pending Prescriptions Disp Refills   • glucose blood test strip 100 each 3     Sig: Use to test blood sugar 1-2 times daily with Onetouch Ultra blue monitor      Last office visit with prescribing clinician: 7/27/2021      Next office visit with prescribing clinician: Visit date not found   Lab: Hemoglobin A1c (07/27/2021 10:10)    Magali Mc LPN  11/04/21, 09:28 EDT

## 2021-11-04 NOTE — PROGRESS NOTES
[facility] - PODIATRY    Today's Date: 11/04/21    Patient Name: Jesse Espinal  MRN: [id number]  CSN: [id number]  PCP: Quyen Farah APRN, last PCP visit: 26 August 2021  Referring Provider: No ref. provider found    SUBJECTIVE     Chief Complaint   Patient presents with   • Left Foot - Diabetes, Annual Exam   • Right Foot - Diabetes, Annual Exam     HPI: Jesse Espinal, a 66 y.o.female, presents to clinic for a diabetic foot evaluation.    New, Established, New Problem: Established    Location:      Duration: 2019    Onset: Insidious    Nature: IDDM    Stable, worsening, improving: Stable    Patient controlling diabetes via: Insulin    Patient denies any fevers, chills, nausea, vomiting, shortness of breath, nor any other constitutional signs nor symptoms.    No other pedal complaints at this time.    Past Medical History:   Diagnosis Date   • Acid reflux disease    • Angina pectoris (HCC)    • Anxiety    • CAD (coronary artery disease)    • Depression    • Diabetes mellitus, type II (HCC)    • Dysuria    • Fatigue    • Foot pain    • GERD (gastroesophageal reflux disease)    • Hematuria    • Hypercholesterolemia    • Hypertension    • MORRIS (obstructive sleep apnea)      Past Surgical History:   Procedure Laterality Date   • APPENDECTOMY     • CARDIAC CATHETERIZATION  07/2013    SEVERE 3 VESSEL CAD   • CHOLECYSTECTOMY  01/22/2013    DIVERTICULOSIS AND INTERNAL HEMORRHOIDS   • CORONARY ANGIOPLASTY WITH STENT PLACEMENT  x2   • CORONARY STENT PLACEMENT  7/30/2013 and 9/04/2014    LAD x4   • CYSTOSCOPY  08/04/2016   • HYSTERECTOMY     • OOPHORECTOMY       Family History   Problem Relation Age of Onset   • Colon cancer Mother    • Coronary artery disease Mother    • Hypertension Mother    • Breast cancer Mother    • Diabetes Mother    • Heart disease Mother    • Stroke Father    • Liver disease Father    • Coronary artery disease Sister    • Hyperlipidemia Sister    • Coronary artery  disease Brother    • Lung cancer Brother    • Hypertension Son    • No Known Problems Maternal Grandmother    • No Known Problems Maternal Grandfather    • No Known Problems Paternal Grandmother    • No Known Problems Paternal Grandfather      Social History     Socioeconomic History   • Marital status:    Tobacco Use   • Smoking status: Former Smoker     Types: Cigarettes   • Smokeless tobacco: Never Used   • Tobacco comment: CAFFEINE USE: 4- 8OZ GLASSES DAILY/ 2 CUPS COFFEE WKLY   Vaping Use   • Vaping Use: Never used   Substance and Sexual Activity   • Alcohol use: Yes     Alcohol/week: 1.0 standard drink     Types: 1 Glasses of wine per week     Comment: occ   • Drug use: No   • Sexual activity: Defer     Allergies   Allergen Reactions   • Other Swelling     SHELL FISH  With Hives also   • Shellfish-Derived Products Swelling   • Sulfa Antibiotics Swelling   • Contrast Dye Swelling   • Effient [Prasugrel] Hives     Current Outpatient Medications   Medication Sig Dispense Refill   • amLODIPine (NORVASC) 5 MG tablet TAKE 1 TABLET BY MOUTH DAILY 90 tablet 1   • aspirin 81 MG chewable tablet Chew 81 mg Daily.     • Brilinta 60 MG tablet tablet TAKE 1 TABLET BY MOUTH EVERY 12 HOURS 180 tablet 2   • glucose blood test strip Use to test blood sugar 1-2 times daily with Onetouch Ultra blue monitor 100 each 3   • isosorbide mononitrate (IMDUR) 30 MG 24 hr tablet Take 1 tablet by mouth Daily for 90 days. 90 tablet 1   • losartan (COZAAR) 100 MG tablet TAKE 1 TABLET BY MOUTH DAILY 90 tablet 1   • metFORMIN ER (GLUCOPHAGE-XR) 500 MG 24 hr tablet Take 1 tablet by mouth Daily With Breakfast. 90 tablet 1   • metoprolol succinate XL (TOPROL-XL) 100 MG 24 hr tablet Take 1 tablet by mouth Daily. 90 tablet 1   • rosuvastatin (CRESTOR) 20 MG tablet Take 1 tablet by mouth Daily. 90 tablet 2     No current facility-administered medications for this visit.     Review of Systems   Constitutional: Negative.    All other systems  reviewed and are negative.      OBJECTIVE     Vitals:    11/04/21 1036   BP: 104/95   Pulse: 113   Resp: 16   Temp: 97.5 °F (36.4 °C)       Body mass index is 36.9 kg/m².    Lab Results   Component Value Date    HGBA1C 6.29 (H) 07/27/2021       Lab Results   Component Value Date    GLUCOSE 117 (H) 07/27/2021    CALCIUM 10.1 07/27/2021     07/27/2021    K 4.6 07/27/2021    CO2 23.1 07/27/2021     07/27/2021    BUN 18 07/27/2021    CREATININE 1.18 (H) 07/27/2021    EGFRIFNONA 46 (L) 07/27/2021    BCR 15.3 07/27/2021    ANIONGAP 9.9 07/27/2021       Patient seen in no apparent distress.      PHYSICAL EXAM:     Foot/Ankle Exam:       General:   Diabetic Foot Exam Performed    Appearance: obesity    Orientation: AAOx3    Affect: appropriate    Gait: unimpaired    Shoe Gear:  Casual shoes    VASCULAR      Right Foot Vascularity   Normal vascular exam    Dorsalis pedis:  2+  Posterior tibial:  2+  Skin Temperature: warm    Edema Grading:  None  CFT:  < 3 seconds  Pedal Hair Growth:  Present  Varicosities: none       Left Foot Vascularity   Normal vascular exam    Dorsalis pedis:  2+  Posterior tibial:  2+  Skin Temperature: warm    Edema Grading:  None  CFT:  < 3 seconds  Pedal Hair Growth:  Present  Varicosities: none        NEUROLOGIC     Right Foot Neurologic   Normal sensation    Light touch sensation:  Normal  Vibratory sensation:  Normal  Hot/Cold sensation: normal    Protective Sensation using [place]-Miri Monofilament:  10     Left Foot Neurologic   Normal sensation    Light touch sensation:  Normal  Vibratory sensation:  Normal  Hot/cold sensation: normal    Protective Sensation using [place]-Miri Monofilament:  10     MUSCLE STRENGTH     Right Foot Muscle Strength   Normal strength    Foot dorsiflexion:  5  Foot plantar flexion:  5  Foot inversion:  5  Foot eversion:  5     Left Foot Muscle Strength   Foot dorsiflexion:  5  Foot plantar flexion:  5  Foot inversion:  5  Foot eversion:  5      RANGE OF MOTION      Right Foot Range of Motion   Foot and ankle ROM within normal limits       Left Foot Range of Motion   Foot and ankle ROM within normal limits       DERMATOLOGIC     Right Foot Dermatologic   Skin: skin intact    Nails: normal       Left Foot Dermatologic   Skin: skin intact    Nails: normal        Diabetic Foot Exam Performed      ASSESSMENT/PLAN     Diagnoses and all orders for this visit:    1. Diabetic foot (HCC) (Primary)        Comprehensive lower extremity examination and evaluation was performed.    Discussed findings and treatment plan including risks, benefits, and treatment options with patient in detail. Patient agreed with treatment plan.    Diabetic foot exam performed and documented this date, compliant with CQM required standards. Detail of findings as noted in physical exam.  Lower extremity Neurologic exam for diabetic patient performed and documented this date, compliant with PQRS required standards. Detail of findings as noted in physical exam.  Advised patient importance of good routine lower extremity hygiene. Advised patient importance of evaluating for intact skin and pain free nail borders.  Advised patient to use mirror to evaluate plantar/ soles of feet for better visualization. Advised patient monitor and phone office to be seen if any cracking to skin, open lesions, painful nail borders or if nails become elongated prior to next visit. Advised patient importance of daily cleansing of lower extremities, followed by good skin cream to maintain normal hydration of skin. Also advised patient importance of close daily monitoring of blood sugar. Advised to regulate diet and medications to maintain control of blood sugar in optimal range. Contact primary care provider if difficulties maintaining blood sugar levels.  Advised Patient of presence of Diabetes Mellitus condition.  Advised Patient risk of progression and worsening or improvement, then return of condition.  Will  monitor condition for any change in future. Treat with most appropriate treatment pending status of condition.  Counseled and advised patient extensively on nature and ramifications of diabetes. Standard instructions given to patient for good diabetic foot care and maintenance. Advised importance of careful monitoring to avoid break down and complications secondary to diabetes. Advised patient importance of strict maintenance of blood sugar control. Advised patient of possible ominous results from neglect of condition, i.e.: amputation/ loss of digits, feet and legs, or even death.  Patient states understands counseling, will monitor closely, continue good hygiene and routine diabetic foot care. Patient will contact office is questions or problems.      An After Visit Summary was printed and given to the patient at discharge, including (if requested) any available informative/educational handouts regarding diagnosis, treatment, or medications. All questions were answered to patient/family satisfaction. Should symptoms fail to improve or worsen they agree to call or return to clinic or to go to the Emergency Department. Discussed the importance of following up with any needed screening tests/labs/specialist appointments and any requested follow-up recommended by me today. Importance of maintaining follow-up discussed and patient accepts that missed appointments can delay diagnosis and potentially lead to worsening of conditions.    Return in about 1 year (around 11/4/2022) for Podiatry Diabetic Foot Exam., or sooner if acute issues arise.    This document has been electronically signed by Jose Luis Marin DPM on November 4, 2021 10:51 EDT

## 2021-12-08 ENCOUNTER — OFFICE VISIT (OUTPATIENT)
Dept: FAMILY MEDICINE CLINIC | Age: 66
End: 2021-12-08

## 2021-12-08 ENCOUNTER — LAB (OUTPATIENT)
Dept: LAB | Facility: HOSPITAL | Age: 66
End: 2021-12-08

## 2021-12-08 VITALS
SYSTOLIC BLOOD PRESSURE: 148 MMHG | DIASTOLIC BLOOD PRESSURE: 82 MMHG | WEIGHT: 214 LBS | BODY MASS INDEX: 36.73 KG/M2 | HEART RATE: 71 BPM

## 2021-12-08 DIAGNOSIS — I10 ESSENTIAL HYPERTENSION: ICD-10-CM

## 2021-12-08 DIAGNOSIS — E11.8 TYPE 2 DIABETES MELLITUS WITH COMPLICATION, WITHOUT LONG-TERM CURRENT USE OF INSULIN (HCC): ICD-10-CM

## 2021-12-08 DIAGNOSIS — E78.49 OTHER HYPERLIPIDEMIA: Primary | ICD-10-CM

## 2021-12-08 DIAGNOSIS — Z23 IMMUNIZATION DUE: ICD-10-CM

## 2021-12-08 LAB
ALBUMIN SERPL-MCNC: 4.4 G/DL (ref 3.5–5.2)
ALBUMIN/GLOB SERPL: 1.1 G/DL
ALP SERPL-CCNC: 82 U/L (ref 39–117)
ALT SERPL W P-5'-P-CCNC: 27 U/L (ref 1–33)
ANION GAP SERPL CALCULATED.3IONS-SCNC: 8.5 MMOL/L (ref 5–15)
AST SERPL-CCNC: 24 U/L (ref 1–32)
BILIRUB SERPL-MCNC: 0.5 MG/DL (ref 0–1.2)
BUN SERPL-MCNC: 17 MG/DL (ref 8–23)
BUN/CREAT SERPL: 14.4 (ref 7–25)
CALCIUM SPEC-SCNC: 10.4 MG/DL (ref 8.6–10.5)
CHLORIDE SERPL-SCNC: 106 MMOL/L (ref 98–107)
CHOLEST SERPL-MCNC: 138 MG/DL (ref 0–200)
CO2 SERPL-SCNC: 23.5 MMOL/L (ref 22–29)
CREAT SERPL-MCNC: 1.18 MG/DL (ref 0.57–1)
GFR SERPL CREATININE-BSD FRML MDRD: 46 ML/MIN/1.73
GLOBULIN UR ELPH-MCNC: 4.1 GM/DL
GLUCOSE SERPL-MCNC: 126 MG/DL (ref 65–99)
HBA1C MFR BLD: 6.66 % (ref 4.8–5.6)
HDLC SERPL-MCNC: 42 MG/DL (ref 40–60)
LDLC SERPL CALC-MCNC: 70 MG/DL (ref 0–100)
LDLC/HDLC SERPL: 1.57 {RATIO}
POTASSIUM SERPL-SCNC: 4.4 MMOL/L (ref 3.5–5.2)
PROT SERPL-MCNC: 8.5 G/DL (ref 6–8.5)
SODIUM SERPL-SCNC: 138 MMOL/L (ref 136–145)
TRIGL SERPL-MCNC: 151 MG/DL (ref 0–150)
VLDLC SERPL-MCNC: 26 MG/DL (ref 5–40)

## 2021-12-08 PROCEDURE — 80053 COMPREHEN METABOLIC PANEL: CPT

## 2021-12-08 PROCEDURE — 91300 COVID-19 (PFIZER): CPT | Performed by: NURSE PRACTITIONER

## 2021-12-08 PROCEDURE — 80061 LIPID PANEL: CPT

## 2021-12-08 PROCEDURE — 99214 OFFICE O/P EST MOD 30 MIN: CPT | Performed by: NURSE PRACTITIONER

## 2021-12-08 PROCEDURE — 83036 HEMOGLOBIN GLYCOSYLATED A1C: CPT

## 2021-12-08 PROCEDURE — 0003A COVID-19 (PFIZER): CPT | Performed by: NURSE PRACTITIONER

## 2021-12-08 PROCEDURE — 36415 COLL VENOUS BLD VENIPUNCTURE: CPT

## 2021-12-08 RX ORDER — AMLODIPINE BESYLATE 5 MG/1
5 TABLET ORAL DAILY
Qty: 90 TABLET | Refills: 1 | Status: SHIPPED | OUTPATIENT
Start: 2021-12-08 | End: 2022-08-01

## 2021-12-08 NOTE — PROGRESS NOTES
Jesse Espinal presents to [facility].    Chief Complaint:  Diabetes and Hypertension         History of Present Illness:  Diabetes:  Current medication metformin  Tolerating medication: Yes  Last eye exam : Remi, May 2021  Last foot exam 7-2021  At home BS ranges: 140's / needs test strips   Lab Results       Component                Value               Date                       HGBA1C                   6.29 (H)            07/27/2021                Hypertension:  Current medication norvasc, toprol XL and losratan   Tolerating Medication: Yes  Checking BP at home and it is 140/70's   Needs refills: Yes/ need amlodipine to [facility]   Labs   Lab Results       Component                Value               Date                       GLUCOSE                  117 (H)             07/27/2021                 BUN                      18                  07/27/2021                 CREATININE               1.18 (H)            07/27/2021                 EGFRIFNONA               46 (L)              07/27/2021                 BCR                      15.3                07/27/2021                 K                        4.6                 07/27/2021                 CO2                      23.1                07/27/2021                 CALCIUM                  10.1                07/27/2021                 ALBUMIN                  4.20                07/27/2021                 LABIL2                   1.4                 03/04/2021                 AST                      19                  07/27/2021                 ALT                      20                  07/27/2021              Hyperlipidemia  Current medication crestor   Tolerating medication: Yes  Needs Refill: yes     Lab Results       Component                Value               Date                       CHOL                     126                 07/27/2021                 CHLPL                    131                  2021                 TRIG                     124                 2021                 HDL                      35 (L)              2021                 LDL                      69                  2021              CAD, sees Dr Dailey on Brillinta     PAST MEDICAL HISTORY changes since 2021            GYNECOLOGICAL HISTORY:             CURRENT MEDICAL PROVIDERS:    Cardiologist: Dr Dailey         PREVENTIVE HEALTH MAINTENANCE             Hepatitis C Medicare Screening: was last done ; negative         Surgical History:         Appendectomy    Cholecystectomy    Hysterectomy     Coronary Artery Stent Placement: 13 and 14 LAD; Procedures: colonoscopy 13, diverticulosis and internal hemorrhoids heart cath , severe 3 vessel CAD     Procedures: cystoscopy 16         Family History:     Father:  at age 45; Cause of death was stroke;  liver disease     Mother:  at age 75; Cause of death was colon;  Coronary Artery Disease; Hypertension;  Breast Cancer; Colon Cancer;  Type 2 Diabetes     Brother(s): 4 brother(s) total; 2 ;  Coronary Artery Disease;  Lung Cancer     Sister(s): 3 sister(s) total; 2 ;  Coronary Artery Disease; Hyperlipidemia; Neurological/Genetic Cerebrovascular Accident (  70 );  pneumonia     Son(s): 3 son(s) total;  Hypertension; Hyperlipidemia         Social History:     Occupation: [employer] off work      Marital Status:      Children: 3 children                 Review of Systems:  Review of Systems   Constitutional: Negative for fatigue and fever.   Respiratory: Negative for cough and shortness of breath.    Cardiovascular: Negative for chest pain, palpitations and leg swelling.   Neurological: Negative for numbness.          Current Outpatient Medications:   •  amLODIPine (NORVASC) 5 MG tablet, Take 1 tablet by mouth Daily., Disp: 90 tablet, Rfl: 1  •  aspirin 81 MG chewable tablet, Chew 81 mg Daily.,  Disp: , Rfl:   •  Brilinta 60 MG tablet tablet, TAKE 1 TABLET BY MOUTH EVERY 12 HOURS, Disp: 180 tablet, Rfl: 2  •  glucose blood test strip, Use to test blood sugar 1-2 times daily with Onetouch Ultra blue monitor, Disp: 100 each, Rfl: 3  •  isosorbide mononitrate (IMDUR) 30 MG 24 hr tablet, Take 1 tablet by mouth Daily for 90 days., Disp: 90 tablet, Rfl: 1  •  losartan (COZAAR) 100 MG tablet, TAKE 1 TABLET BY MOUTH DAILY, Disp: 90 tablet, Rfl: 1  •  metFORMIN ER (GLUCOPHAGE-XR) 500 MG 24 hr tablet, Take 1 tablet by mouth Daily With Breakfast., Disp: 90 tablet, Rfl: 1  •  metoprolol succinate XL (TOPROL-XL) 100 MG 24 hr tablet, Take 1 tablet by mouth Daily., Disp: 90 tablet, Rfl: 1  •  rosuvastatin (CRESTOR) 20 MG tablet, Take 1 tablet by mouth Daily., Disp: 90 tablet, Rfl: 2    Vital Signs:   Vitals:    12/08/21 0933   BP: 148/82   BP Location: Left arm   Patient Position: Sitting   Pulse: 71   Weight: 97.1 kg (214 lb)         Physical Exam:  Physical Exam  Vitals reviewed.   Constitutional:       General: She is not in acute distress.     Appearance: Normal appearance.   Neck:      Vascular: No carotid bruit.   Cardiovascular:      Rate and Rhythm: Normal rate and regular rhythm.      Heart sounds: Normal heart sounds. No murmur heard.      Pulmonary:      Effort: Pulmonary effort is normal. No respiratory distress.      Breath sounds: Normal breath sounds.   Musculoskeletal:      Right lower leg: No edema.      Left lower leg: No edema.   Neurological:      Mental Status: She is alert.   Psychiatric:         Mood and Affect: Mood normal.         Behavior: Behavior normal.         Result Review      The following data was reviewed by: SPIKE Jj on 12/08/2021:    Results for orders placed or performed in visit on 07/27/21   Comprehensive Metabolic Panel    Specimen: Blood   Result Value Ref Range    Glucose 117 (H) 65 - 99 mg/dL    BUN 18 8 - 23 mg/dL    Creatinine 1.18 (H) 0.57 - 1.00 mg/dL     Sodium 139 136 - 145 mmol/L    Potassium 4.6 3.5 - 5.2 mmol/L    Chloride 106 98 - 107 mmol/L    CO2 23.1 22.0 - 29.0 mmol/L    Calcium 10.1 8.6 - 10.5 mg/dL    Total Protein 7.8 6.0 - 8.5 g/dL    Albumin 4.20 3.50 - 5.20 g/dL    ALT (SGPT) 20 1 - 33 U/L    AST (SGOT) 19 1 - 32 U/L    Alkaline Phosphatase 69 39 - 117 U/L    Total Bilirubin 0.5 0.0 - 1.2 mg/dL    eGFR Non African Amer 46 (L) >60 mL/min/1.73    Globulin 3.6 gm/dL    A/G Ratio 1.2 g/dL    BUN/Creatinine Ratio 15.3 7.0 - 25.0    Anion Gap 9.9 5.0 - 15.0 mmol/L   Lipid Panel    Specimen: Blood   Result Value Ref Range    Total Cholesterol 126 0 - 200 mg/dL    Triglycerides 124 0 - 150 mg/dL    HDL Cholesterol 35 (L) 40 - 60 mg/dL    LDL Cholesterol  69 0 - 100 mg/dL    VLDL Cholesterol 22 5 - 40 mg/dL    LDL/HDL Ratio 1.89    Hemoglobin A1c    Specimen: Blood   Result Value Ref Range    Hemoglobin A1C 6.29 (H) 4.80 - 5.60 %               Assessment and Plan:          Diagnoses and all orders for this visit:    1. Other hyperlipidemia (Primary)  Assessment & Plan:  Continue current medication and efforts with diet and exercise.         2. Essential hypertension  Assessment & Plan:  Hypertension is stable. Continue to monitor BP at home. Continue current meds. Continue to modify diet and lifestyle. Will need labs every 6 months and follow up.       Orders:  -     amLODIPine (NORVASC) 5 MG tablet; Take 1 tablet by mouth Daily.  Dispense: 90 tablet; Refill: 1    3. Type 2 diabetes mellitus with complication, without long-term current use of insulin (HCC)  Assessment & Plan:  Sending in refills on her test strips to [facility], checking labs, To work on diet, regular exercise, monitor sugars, check feet daily, see optometrist yearly or as directed.      Orders:  -     Comprehensive Metabolic Panel; Future  -     Lipid Panel; Future  -     Hemoglobin A1c; Future    4. Immunization due  -     COVID-19 Vaccine (Pfizer)        Follow Up   Return for followup  pending lab results.  Patient was given instructions and counseling regarding her condition or for health maintenance advice. Please see specific information pulled into the AVS if appropriate.

## 2021-12-08 NOTE — ASSESSMENT & PLAN NOTE
Sending in refills on her test strips to [facility], checking labs, To work on diet, regular exercise, monitor sugars, check feet daily, see optometrist yearly or as directed.

## 2021-12-11 DIAGNOSIS — E11.8 TYPE 2 DIABETES MELLITUS WITH COMPLICATION, WITHOUT LONG-TERM CURRENT USE OF INSULIN (HCC): ICD-10-CM

## 2021-12-13 RX ORDER — BLOOD SUGAR DIAGNOSTIC
STRIP MISCELLANEOUS
Qty: 100 EACH | Refills: 5 | Status: SHIPPED | OUTPATIENT
Start: 2021-12-13 | End: 2022-06-08 | Stop reason: SDUPTHER

## 2022-01-17 DIAGNOSIS — E11.8 TYPE 2 DIABETES MELLITUS WITH COMPLICATION, WITHOUT LONG-TERM CURRENT USE OF INSULIN: ICD-10-CM

## 2022-01-18 RX ORDER — METFORMIN HYDROCHLORIDE 500 MG/1
500 TABLET, EXTENDED RELEASE ORAL
Qty: 90 TABLET | Refills: 1 | Status: SHIPPED | OUTPATIENT
Start: 2022-01-18 | End: 2022-08-03

## 2022-01-31 RX ORDER — ISOSORBIDE MONONITRATE 30 MG/1
TABLET, EXTENDED RELEASE ORAL
Qty: 90 TABLET | Refills: 1 | Status: SHIPPED | OUTPATIENT
Start: 2022-01-31 | End: 2022-08-17

## 2022-03-04 RX ORDER — TICAGRELOR 60 MG/1
TABLET ORAL
Qty: 180 TABLET | Refills: 2 | Status: SHIPPED | OUTPATIENT
Start: 2022-03-04 | End: 2022-03-23 | Stop reason: SDUPTHER

## 2022-03-22 DIAGNOSIS — E78.49 OTHER HYPERLIPIDEMIA: ICD-10-CM

## 2022-03-22 RX ORDER — ROSUVASTATIN CALCIUM 20 MG/1
20 TABLET, COATED ORAL DAILY
Qty: 90 TABLET | Refills: 2 | OUTPATIENT
Start: 2022-03-22

## 2022-04-23 DIAGNOSIS — I10 ESSENTIAL HYPERTENSION: ICD-10-CM

## 2022-04-25 RX ORDER — METOPROLOL SUCCINATE 100 MG/1
TABLET, EXTENDED RELEASE ORAL
Qty: 90 TABLET | Refills: 1 | Status: SHIPPED | OUTPATIENT
Start: 2022-04-25 | End: 2023-01-25

## 2022-05-05 ENCOUNTER — TRANSCRIBE ORDERS (OUTPATIENT)
Dept: ADMINISTRATIVE | Facility: HOSPITAL | Age: 67
End: 2022-05-05

## 2022-05-05 ENCOUNTER — LAB (OUTPATIENT)
Dept: LAB | Facility: HOSPITAL | Age: 67
End: 2022-05-05

## 2022-05-05 DIAGNOSIS — R80.9 PROTEINURIA, UNSPECIFIED TYPE: ICD-10-CM

## 2022-05-05 DIAGNOSIS — N18.31 CHRONIC KIDNEY DISEASE (CKD) STAGE G3A/A1, MODERATELY DECREASED GLOMERULAR FILTRATION RATE (GFR) BETWEEN 45-59 ML/MIN/1.73 SQUARE METER AND ALBUMINURIA CREATININE RATIO LESS THAN 30 MG/G (CMS/H*: ICD-10-CM

## 2022-05-05 DIAGNOSIS — R80.9 PROTEINURIA, UNSPECIFIED TYPE: Primary | ICD-10-CM

## 2022-05-05 LAB
25(OH)D3 SERPL-MCNC: 28.8 NG/ML (ref 30–100)
ALBUMIN SERPL-MCNC: 4.3 G/DL (ref 3.5–5.2)
ANION GAP SERPL CALCULATED.3IONS-SCNC: 11.3 MMOL/L (ref 5–15)
BACTERIA UR QL AUTO: ABNORMAL /HPF
BASOPHILS # BLD AUTO: 0.06 10*3/MM3 (ref 0–0.2)
BASOPHILS NFR BLD AUTO: 0.9 % (ref 0–1.5)
BILIRUB UR QL STRIP: NEGATIVE
BUN SERPL-MCNC: 13 MG/DL (ref 8–23)
BUN/CREAT SERPL: 11.4 (ref 7–25)
CALCIUM SPEC-SCNC: 10.2 MG/DL (ref 8.6–10.5)
CHLORIDE SERPL-SCNC: 106 MMOL/L (ref 98–107)
CLARITY UR: CLEAR
CO2 SERPL-SCNC: 22.7 MMOL/L (ref 22–29)
COLOR UR: YELLOW
CREAT SERPL-MCNC: 1.14 MG/DL (ref 0.57–1)
DEPRECATED RDW RBC AUTO: 47.8 FL (ref 37–54)
EGFRCR SERPLBLD CKD-EPI 2021: 52.5 ML/MIN/1.73
EOSINOPHIL # BLD AUTO: 0.37 10*3/MM3 (ref 0–0.4)
EOSINOPHIL NFR BLD AUTO: 5.5 % (ref 0.3–6.2)
ERYTHROCYTE [DISTWIDTH] IN BLOOD BY AUTOMATED COUNT: 14.1 % (ref 12.3–15.4)
GLUCOSE SERPL-MCNC: 123 MG/DL (ref 65–99)
GLUCOSE UR STRIP-MCNC: NEGATIVE MG/DL
HCT VFR BLD AUTO: 44.7 % (ref 34–46.6)
HGB BLD-MCNC: 14.2 G/DL (ref 12–15.9)
HGB UR QL STRIP.AUTO: ABNORMAL
IMM GRANULOCYTES # BLD AUTO: 0.02 10*3/MM3 (ref 0–0.05)
IMM GRANULOCYTES NFR BLD AUTO: 0.3 % (ref 0–0.5)
KETONES UR QL STRIP: NEGATIVE
LEUKOCYTE ESTERASE UR QL STRIP.AUTO: NEGATIVE
LYMPHOCYTES # BLD AUTO: 1.87 10*3/MM3 (ref 0.7–3.1)
LYMPHOCYTES NFR BLD AUTO: 28 % (ref 19.6–45.3)
MCH RBC QN AUTO: 29.2 PG (ref 26.6–33)
MCHC RBC AUTO-ENTMCNC: 31.8 G/DL (ref 31.5–35.7)
MCV RBC AUTO: 92 FL (ref 79–97)
MONOCYTES # BLD AUTO: 0.61 10*3/MM3 (ref 0.1–0.9)
MONOCYTES NFR BLD AUTO: 9.1 % (ref 5–12)
NEUTROPHILS NFR BLD AUTO: 3.76 10*3/MM3 (ref 1.7–7)
NEUTROPHILS NFR BLD AUTO: 56.2 % (ref 42.7–76)
NITRITE UR QL STRIP: NEGATIVE
PH UR STRIP.AUTO: 6 [PH] (ref 5–8)
PHOSPHATE SERPL-MCNC: 3.5 MG/DL (ref 2.5–4.5)
PLATELET # BLD AUTO: 254 10*3/MM3 (ref 140–450)
PMV BLD AUTO: 10.1 FL (ref 6–12)
POTASSIUM SERPL-SCNC: 4.5 MMOL/L (ref 3.5–5.2)
PROT UR QL STRIP: ABNORMAL
PTH-INTACT SERPL-MCNC: 59.2 PG/ML (ref 15–65)
RBC # BLD AUTO: 4.86 10*6/MM3 (ref 3.77–5.28)
RBC # UR STRIP: ABNORMAL /HPF
REF LAB TEST METHOD: ABNORMAL
SODIUM SERPL-SCNC: 140 MMOL/L (ref 136–145)
SP GR UR STRIP: 1.02 (ref 1–1.03)
SQUAMOUS #/AREA URNS HPF: ABNORMAL /HPF
UROBILINOGEN UR QL STRIP: ABNORMAL
WBC # UR STRIP: ABNORMAL /HPF
WBC NRBC COR # BLD: 6.69 10*3/MM3 (ref 3.4–10.8)

## 2022-05-05 PROCEDURE — 86335 IMMUNFIX E-PHORSIS/URINE/CSF: CPT

## 2022-05-05 PROCEDURE — 85025 COMPLETE CBC W/AUTO DIFF WBC: CPT

## 2022-05-05 PROCEDURE — 83970 ASSAY OF PARATHORMONE: CPT

## 2022-05-05 PROCEDURE — 84165 PROTEIN E-PHORESIS SERUM: CPT

## 2022-05-05 PROCEDURE — 84155 ASSAY OF PROTEIN SERUM: CPT

## 2022-05-05 PROCEDURE — 82306 VITAMIN D 25 HYDROXY: CPT

## 2022-05-05 PROCEDURE — 36415 COLL VENOUS BLD VENIPUNCTURE: CPT

## 2022-05-05 PROCEDURE — 80069 RENAL FUNCTION PANEL: CPT

## 2022-05-05 PROCEDURE — 81001 URINALYSIS AUTO W/SCOPE: CPT

## 2022-05-09 LAB
ALBUMIN SERPL ELPH-MCNC: 3.8 G/DL (ref 2.9–4.4)
ALBUMIN/GLOB SERPL: 1 {RATIO} (ref 0.7–1.7)
ALPHA1 GLOB SERPL ELPH-MCNC: 0.2 G/DL (ref 0–0.4)
ALPHA2 GLOB SERPL ELPH-MCNC: 1 G/DL (ref 0.4–1)
B-GLOBULIN SERPL ELPH-MCNC: 1.1 G/DL (ref 0.7–1.3)
GAMMA GLOB SERPL ELPH-MCNC: 1.5 G/DL (ref 0.4–1.8)
GLOBULIN SER CALC-MCNC: 3.8 G/DL (ref 2.2–3.9)
LABORATORY COMMENT REPORT: NORMAL
M PROTEIN SERPL ELPH-MCNC: NORMAL G/DL
PROT SERPL-MCNC: 7.6 G/DL (ref 6–8.5)

## 2022-05-10 LAB — INTERPRETATION UR IFE-IMP: NORMAL

## 2022-06-03 ENCOUNTER — TELEPHONE (OUTPATIENT)
Dept: FAMILY MEDICINE CLINIC | Age: 67
End: 2022-06-03

## 2022-06-03 NOTE — TELEPHONE ENCOUNTER
Caller: Jesse Espinal    Relationship to patient: Self    Best call back number: [phone]    Date of positive COVID19 test: 06.03.2022 HOME TEST    COVID19 symptoms: SORE THROAT, FEVER/CHILLS, COUGH, HEADACHE    Date of initial quarantine: 06.03.1408    What is the patients preferred pharmacy:     [facility] #[id number] [address] [place] [address] AT [facility] OF [address] [facility] [facility] - [phone]  - [phone]   [phone]

## 2022-06-08 ENCOUNTER — LAB (OUTPATIENT)
Dept: LAB | Facility: HOSPITAL | Age: 67
End: 2022-06-08

## 2022-06-08 ENCOUNTER — TRANSCRIBE ORDERS (OUTPATIENT)
Dept: ADMINISTRATIVE | Facility: HOSPITAL | Age: 67
End: 2022-06-08

## 2022-06-08 ENCOUNTER — OFFICE VISIT (OUTPATIENT)
Dept: FAMILY MEDICINE CLINIC | Age: 67
End: 2022-06-08

## 2022-06-08 VITALS
OXYGEN SATURATION: 95 % | TEMPERATURE: 98.4 F | BODY MASS INDEX: 36.12 KG/M2 | HEIGHT: 64 IN | HEART RATE: 67 BPM | WEIGHT: 211.6 LBS | DIASTOLIC BLOOD PRESSURE: 60 MMHG | SYSTOLIC BLOOD PRESSURE: 137 MMHG

## 2022-06-08 DIAGNOSIS — E11.8 TYPE 2 DIABETES MELLITUS WITH COMPLICATION, WITHOUT LONG-TERM CURRENT USE OF INSULIN: ICD-10-CM

## 2022-06-08 DIAGNOSIS — E11.8 TYPE 2 DIABETES MELLITUS WITH COMPLICATION, WITHOUT LONG-TERM CURRENT USE OF INSULIN: Primary | ICD-10-CM

## 2022-06-08 DIAGNOSIS — E78.49 OTHER HYPERLIPIDEMIA: ICD-10-CM

## 2022-06-08 DIAGNOSIS — Z86.16 HISTORY OF COVID-19: ICD-10-CM

## 2022-06-08 DIAGNOSIS — N18.31 CHRONIC KIDNEY DISEASE (CKD) STAGE G3A/A1, MODERATELY DECREASED GLOMERULAR FILTRATION RATE (GFR) BETWEEN 45-59 ML/MIN/1.73 SQUARE METER AND ALBUMINURIA CREATININE RATIO LESS THAN 30 MG/G (CMS/H*: ICD-10-CM

## 2022-06-08 DIAGNOSIS — N18.31 CHRONIC KIDNEY DISEASE (CKD) STAGE G3A/A1, MODERATELY DECREASED GLOMERULAR FILTRATION RATE (GFR) BETWEEN 45-59 ML/MIN/1.73 SQUARE METER AND ALBUMINURIA CREATININE RATIO LESS THAN 30 MG/G (CMS/H*: Primary | ICD-10-CM

## 2022-06-08 DIAGNOSIS — I10 ESSENTIAL HYPERTENSION: ICD-10-CM

## 2022-06-08 LAB
ALBUMIN SERPL-MCNC: 4.6 G/DL (ref 3.5–5.2)
ALBUMIN/GLOB SERPL: 1.3 G/DL
ALP SERPL-CCNC: 79 U/L (ref 39–117)
ALT SERPL W P-5'-P-CCNC: 28 U/L (ref 1–33)
ANION GAP SERPL CALCULATED.3IONS-SCNC: 14.3 MMOL/L (ref 5–15)
AST SERPL-CCNC: 26 U/L (ref 1–32)
BILIRUB SERPL-MCNC: 0.4 MG/DL (ref 0–1.2)
BUN SERPL-MCNC: 17 MG/DL (ref 8–23)
BUN/CREAT SERPL: 13.1 (ref 7–25)
C3 SERPL-MCNC: 150 MG/DL (ref 82–167)
C4 SERPL-MCNC: 22 MG/DL (ref 14–44)
CALCIUM SPEC-SCNC: 10.6 MG/DL (ref 8.6–10.5)
CHLORIDE SERPL-SCNC: 100 MMOL/L (ref 98–107)
CHOLEST SERPL-MCNC: 100 MG/DL (ref 0–200)
CO2 SERPL-SCNC: 23.7 MMOL/L (ref 22–29)
CREAT SERPL-MCNC: 1.3 MG/DL (ref 0.57–1)
EGFRCR SERPLBLD CKD-EPI 2021: 44.9 ML/MIN/1.73
GLOBULIN UR ELPH-MCNC: 3.6 GM/DL
GLUCOSE SERPL-MCNC: 135 MG/DL (ref 65–99)
HBA1C MFR BLD: 6.8 % (ref 4.8–5.6)
HDLC SERPL-MCNC: 30 MG/DL (ref 40–60)
LDLC SERPL CALC-MCNC: 45 MG/DL (ref 0–100)
LDLC/HDLC SERPL: 1.35 {RATIO}
POTASSIUM SERPL-SCNC: 3.8 MMOL/L (ref 3.5–5.2)
PROT SERPL-MCNC: 8.2 G/DL (ref 6–8.5)
SODIUM SERPL-SCNC: 138 MMOL/L (ref 136–145)
TRIGL SERPL-MCNC: 147 MG/DL (ref 0–150)
VLDLC SERPL-MCNC: 25 MG/DL (ref 5–40)

## 2022-06-08 PROCEDURE — 86235 NUCLEAR ANTIGEN ANTIBODY: CPT

## 2022-06-08 PROCEDURE — 83520 IMMUNOASSAY QUANT NOS NONAB: CPT

## 2022-06-08 PROCEDURE — 86037 ANCA TITER EACH ANTIBODY: CPT

## 2022-06-08 PROCEDURE — 86160 COMPLEMENT ANTIGEN: CPT

## 2022-06-08 PROCEDURE — 36415 COLL VENOUS BLD VENIPUNCTURE: CPT

## 2022-06-08 PROCEDURE — 80053 COMPREHEN METABOLIC PANEL: CPT

## 2022-06-08 PROCEDURE — 99214 OFFICE O/P EST MOD 30 MIN: CPT | Performed by: NURSE PRACTITIONER

## 2022-06-08 PROCEDURE — 83036 HEMOGLOBIN GLYCOSYLATED A1C: CPT

## 2022-06-08 PROCEDURE — 80061 LIPID PANEL: CPT

## 2022-06-08 PROCEDURE — 86225 DNA ANTIBODY NATIVE: CPT

## 2022-06-08 RX ORDER — HYDROCHLOROTHIAZIDE 12.5 MG/1
12.5 TABLET ORAL NIGHTLY
COMMUNITY
Start: 2022-05-13

## 2022-06-08 RX ORDER — BLOOD SUGAR DIAGNOSTIC
1 STRIP MISCELLANEOUS SEE ADMIN INSTRUCTIONS
Qty: 100 EACH | Refills: 5 | Status: SHIPPED | OUTPATIENT
Start: 2022-06-08 | End: 2023-01-31 | Stop reason: SDUPTHER

## 2022-06-08 NOTE — ASSESSMENT & PLAN NOTE
Hypertension is stable. Continue to monitor BP at home. Continue current meds. Continue to modify diet and lifestyle. Will need labs every 6 months and follow up.   Her bp has improved with the recent addition of diuretic neph gave her, reviewed her log sheet of bp readings

## 2022-06-08 NOTE — TELEPHONE ENCOUNTER
Rx Refill Note  Requested Prescriptions     Pending Prescriptions Disp Refills   • glucose blood (OneTouch Ultra) test strip 100 each 5     Si each by Other route See Admin Instructions. Use once to twice daily as needed      Last office visit with prescribing clinician: 2022      Next office visit with prescribing clinician: 2022      Magali Mc LPN  22, 10:59 EDT

## 2022-06-08 NOTE — ASSESSMENT & PLAN NOTE
To work on diet, regular exercise, monitor sugars, check feet daily, see optometrist yearly or as directed.  Keep podiatry follow up appt

## 2022-06-08 NOTE — PROGRESS NOTES
Jesse Espinal presents to [facility].    Chief Complaint:  Hypertension, Hyperlipidemia, and Diabetes         History of Present Illness:  Diabetes:  Current medication: metformin, does not need refills   Tolerating medication: Yes  Last eye exam: 5-2022  Last foot exam: 7-2021 & 9-2021 podiatry did her foot exam   At home BS ranges: <140  Lab Results       Component                Value               Date                       HGBA1C                   6.66 (H)            12/08/2021              Hyperlipidemia  Current medication: crestor   Tolerating medication: Yes  Needs Refill: No     Lab Results       Component                Value               Date                       CHOL                     138                 12/08/2021                 CHLPL                    131                 01/26/2021                 TRIG                     151 (H)             12/08/2021                 HDL                      42                  12/08/2021                 LDL                      70                  12/08/2021              Hypertension:  Current medication: norvasc,losartn, toprol, HCTZ   Tolerating Medication: Yes  Checking BP at home and it is: 120-160 over 50-90's  Needs refills: No  Labs:  Lab Results       Component                Value               Date                       GLUCOSE                  123 (H)             05/05/2022                 BUN                      13                  05/05/2022                 CREATININE               1.14 (H)            05/05/2022                 EGFRIFNONA               46 (L)              12/08/2021                 BCR                      11.4                05/05/2022                 K                        4.5                 05/05/2022                 CO2                      22.7                05/05/2022                 CALCIUM                  10.2                05/05/2022                 PROTENTOTREF             7.6                  2022                 ALBUMIN                  3.8                 2022                 ALBUMIN                  4.30                2022                 LABIL2                   1.0                 2022                 AST                      24                  2021                 ALT                      27                  2021              CAD, sees Dr Dailey, on brillinta     Sees neph grecia and has lab orders that he wants her to have done for her CKD 3a    PAST MEDICAL HISTORY changes since 2021      COVID + , mild ( was in hospital)         GYNECOLOGICAL HISTORY:             CURRENT MEDICAL PROVIDERS:    Cardiologist: Dr Dailey         PREVENTIVE HEALTH MAINTENANCE             Hepatitis C Medicare Screening: was last done ; negative         Surgical History:         Appendectomy    Cholecystectomy    Hysterectomy     Coronary Artery Stent Placement: 13 and 14 LAD; Procedures: colonoscopy 13, diverticulosis and internal hemorrhoids heart cath , severe 3 vessel CAD     Procedures: cystoscopy 16         Family History:     Father:  at age 45; Cause of death was stroke;  liver disease     Mother:  at age 75; Cause of death was colon;  Coronary Artery Disease; Hypertension;  Breast Cancer; Colon Cancer;  Type 2 Diabetes     Brother(s): 4 brother(s) total; 2 ;  Coronary Artery Disease;  Lung Cancer     Sister(s): 3 sister(s) total; 2 ;  Coronary Artery Disease; Hyperlipidemia; Neurological/Genetic Cerebrovascular Accident (  70 );  pneumonia     Son(s): 3 son(s) total;  Hypertension; Hyperlipidemia         Social History:     Occupation: [employer] off work      Marital Status:      Children: 3 children                 Review of Systems:  Review of Systems   Constitutional: Negative for fatigue and fever (last week, not now ).   HENT: Positive for sore throat  "(intermittently ).    Respiratory: Positive for cough (occ ) and shortness of breath (with mod to heavy exertion ).    Cardiovascular: Negative for chest pain, palpitations and leg swelling.   Neurological: Negative for numbness.          Current Outpatient Medications:   •  amLODIPine (NORVASC) 5 MG tablet, Take 1 tablet by mouth Daily., Disp: 90 tablet, Rfl: 1  •  aspirin 81 MG chewable tablet, Chew 81 mg Daily., Disp: , Rfl:   •  hydroCHLOROthiazide (HYDRODIURIL) 12.5 MG tablet, Take 12.5 mg by mouth Every Night., Disp: , Rfl:   •  isosorbide mononitrate (IMDUR) 30 MG 24 hr tablet, TAKE 1 TABLET BY MOUTH DAILY, Disp: 90 tablet, Rfl: 1  •  losartan (COZAAR) 100 MG tablet, TAKE 1 TABLET BY MOUTH DAILY, Disp: 90 tablet, Rfl: 1  •  metFORMIN ER (GLUCOPHAGE-XR) 500 MG 24 hr tablet, TAKE 1 TABLET BY MOUTH DAILY WITH BREAKFAST, Disp: 90 tablet, Rfl: 1  •  metoprolol succinate XL (TOPROL-XL) 100 MG 24 hr tablet, TAKE 1 TABLET(100 MG) BY MOUTH EVERY DAY, Disp: 90 tablet, Rfl: 1  •  rosuvastatin (CRESTOR) 20 MG tablet, Take 1 tablet by mouth Daily., Disp: 90 tablet, Rfl: 2  •  ticagrelor (Brilinta) 60 MG tablet tablet, Take 1 tablet by mouth Every 12 (Twelve) Hours., Disp: 180 tablet, Rfl: 2  •  glucose blood (OneTouch Ultra) test strip, 1 each by Other route See Admin Instructions. Use once to twice daily as needed, Disp: 100 each, Rfl: 5    Vital Signs:   Vitals:    06/08/22 1008   BP: 137/60   BP Location: Right arm   Patient Position: Sitting   Pulse: 67   Temp: 98.4 °F (36.9 °C)   TempSrc: Oral   SpO2: 95%  Comment: on room air   Weight: 96 kg (211 lb 9.6 oz)   Height: 162.6 cm (64\")         Physical Exam:  Physical Exam  Vitals reviewed.   Constitutional:       General: She is not in acute distress.     Appearance: Normal appearance.   HENT:      Right Ear: Tympanic membrane normal.      Left Ear: Tympanic membrane normal.      Nose: No congestion.      Mouth/Throat:      Pharynx: Oropharynx is clear.   Neck:      " Vascular: No carotid bruit.   Cardiovascular:      Rate and Rhythm: Normal rate and regular rhythm.      Heart sounds: Normal heart sounds. No murmur heard.  Pulmonary:      Effort: Pulmonary effort is normal. No respiratory distress.      Breath sounds: Normal breath sounds.   Musculoskeletal:      Right lower leg: No edema.      Left lower leg: No edema.   Lymphadenopathy:      Cervical: No cervical adenopathy.   Neurological:      Mental Status: She is alert.   Psychiatric:         Mood and Affect: Mood normal.         Behavior: Behavior normal.         Result Review      The following data was reviewed by: SPIKE Jj on 06/08/2022:    Results for orders placed or performed in visit on 05/05/22   Immunofixation, Urine - Urine, Clean Catch    Specimen: Urine, Clean Catch   Result Value Ref Range    LILLI Interpretation:U Comment    Protein Electrophoresis, Total    Specimen: Blood   Result Value Ref Range    Total Protein 7.6 6.0 - 8.5 g/dL    Albumin 3.8 2.9 - 4.4 g/dL    Alpha-1-Globulin 0.2 0.0 - 0.4 g/dL    Alpha-2-Globulin 1.0 0.4 - 1.0 g/dL    Beta Globulin 1.1 0.7 - 1.3 g/dL    Gamma Globulin 1.5 0.4 - 1.8 g/dL    M-Pollo Not Observed Not Observed g/dL    Globulin 3.8 2.2 - 3.9 g/dL    A/G Ratio 1.0 0.7 - 1.7    Please note Comment    Renal Function Panel    Specimen: Blood   Result Value Ref Range    Glucose 123 (H) 65 - 99 mg/dL    BUN 13 8 - 23 mg/dL    Creatinine 1.14 (H) 0.57 - 1.00 mg/dL    Sodium 140 136 - 145 mmol/L    Potassium 4.5 3.5 - 5.2 mmol/L    Chloride 106 98 - 107 mmol/L    CO2 22.7 22.0 - 29.0 mmol/L    Calcium 10.2 8.6 - 10.5 mg/dL    Albumin 4.30 3.50 - 5.20 g/dL    Phosphorus 3.5 2.5 - 4.5 mg/dL    Anion Gap 11.3 5.0 - 15.0 mmol/L    BUN/Creatinine Ratio 11.4 7.0 - 25.0    eGFR 52.5 (L) >60.0 mL/min/1.73   Vitamin D 25 Hydroxy    Specimen: Blood   Result Value Ref Range    25 Hydroxy, Vitamin D 28.8 (L) 30.0 - 100.0 ng/ml   PTH, Intact    Specimen: Blood   Result Value  Ref Range    PTH, Intact 59.2 15.0 - 65.0 pg/mL   CBC Auto Differential    Specimen: Blood   Result Value Ref Range    WBC 6.69 3.40 - 10.80 10*3/mm3    RBC 4.86 3.77 - 5.28 10*6/mm3    Hemoglobin 14.2 12.0 - 15.9 g/dL    Hematocrit 44.7 34.0 - 46.6 %    MCV 92.0 79.0 - 97.0 fL    MCH 29.2 26.6 - 33.0 pg    MCHC 31.8 31.5 - 35.7 g/dL    RDW 14.1 12.3 - 15.4 %    RDW-SD 47.8 37.0 - 54.0 fl    MPV 10.1 6.0 - 12.0 fL    Platelets 254 140 - 450 10*3/mm3    Neutrophil % 56.2 42.7 - 76.0 %    Lymphocyte % 28.0 19.6 - 45.3 %    Monocyte % 9.1 5.0 - 12.0 %    Eosinophil % 5.5 0.3 - 6.2 %    Basophil % 0.9 0.0 - 1.5 %    Immature Grans % 0.3 0.0 - 0.5 %    Neutrophils, Absolute 3.76 1.70 - 7.00 10*3/mm3    Lymphocytes, Absolute 1.87 0.70 - 3.10 10*3/mm3    Monocytes, Absolute 0.61 0.10 - 0.90 10*3/mm3    Eosinophils, Absolute 0.37 0.00 - 0.40 10*3/mm3    Basophils, Absolute 0.06 0.00 - 0.20 10*3/mm3    Immature Grans, Absolute 0.02 0.00 - 0.05 10*3/mm3   Urinalysis without microscopic (no culture) - Urine, Clean Catch    Specimen: Urine, Clean Catch   Result Value Ref Range    Color, UA Yellow Yellow, Straw    Appearance, UA Clear Clear    pH, UA 6.0 5.0 - 8.0    Specific Gravity, UA 1.025 1.005 - 1.030    Glucose, UA Negative Negative    Ketones, UA Negative Negative    Bilirubin, UA Negative Negative    Blood, UA Moderate (2+) (A) Negative    Protein,  mg/dL (2+) (A) Negative    Leuk Esterase, UA Negative Negative    Nitrite, UA Negative Negative    Urobilinogen, UA 0.2 E.U./dL 0.2 - 1.0 E.U./dL   Urinalysis, Microscopic Only - Urine, Clean Catch    Specimen: Urine, Clean Catch   Result Value Ref Range    RBC, UA 6-12 (A) None Seen /HPF    WBC, UA None Seen None Seen /HPF    Bacteria, UA Trace (A) None Seen /HPF    Squamous Epithelial Cells, UA 3-6 (A) None Seen, 0-2 /HPF    Methodology Manual Light Microscopy                Assessment and Plan:          Diagnoses and all orders for this visit:    1. Type 2 diabetes  mellitus with complication, without long-term current use of insulin (HCC) (Primary)  Assessment & Plan:  To work on diet, regular exercise, monitor sugars, check feet daily, see optometrist yearly or as directed.  Keep podiatry follow up appt     Orders:  -     Comprehensive Metabolic Panel; Future  -     Lipid Panel; Future  -     Hemoglobin A1c; Future    2. Other hyperlipidemia  Assessment & Plan:  Continue current medication and efforts with diet and exercise.       Orders:  -     Comprehensive Metabolic Panel; Future  -     Lipid Panel; Future    3. Essential hypertension  Assessment & Plan:  Hypertension is stable. Continue to monitor BP at home. Continue current meds. Continue to modify diet and lifestyle. Will need labs every 6 months and follow up.   Her bp has improved with the recent addition of diuretic neph gave her, reviewed her log sheet of bp readings     Orders:  -     Comprehensive Metabolic Panel; Future    4. History of COVID-19  Assessment & Plan:  Rest, increase fluids, follow up if symptoms progress or change             Follow Up   Return if symptoms worsen or fail to improve, for followup pending lab results.  Patient was given instructions and counseling regarding her condition or for health maintenance advice. Please see specific information pulled into the AVS if appropriate.

## 2022-06-10 LAB
CENTROMERE B AB SER-ACNC: <0.2 AI (ref 0–0.9)
CHROMATIN AB SERPL-ACNC: <0.2 AI (ref 0–0.9)
DSDNA AB SER-ACNC: <1 IU/ML (ref 0–9)
ENA JO1 AB SER-ACNC: <0.2 AI (ref 0–0.9)
ENA RNP AB SER-ACNC: <0.2 AI (ref 0–0.9)
ENA SCL70 AB SER-ACNC: <0.2 AI (ref 0–0.9)
ENA SM AB SER-ACNC: <0.2 AI (ref 0–0.9)
ENA SS-A AB SER-ACNC: <0.2 AI (ref 0–0.9)
ENA SS-B AB SER-ACNC: <0.2 AI (ref 0–0.9)
Lab: NORMAL

## 2022-06-12 LAB
C-ANCA TITR SER IF: ABNORMAL TITER
MYELOPEROXIDASE AB SER IA-ACNC: <9 U/ML (ref 0–9)
P-ANCA ATYPICAL TITR SER IF: ABNORMAL TITER
P-ANCA TITR SER IF: ABNORMAL TITER
PROTEINASE3 AB SER IA-ACNC: <3.5 U/ML (ref 0–3.5)

## 2022-07-19 ENCOUNTER — OFFICE VISIT (OUTPATIENT)
Dept: CARDIOLOGY | Facility: CLINIC | Age: 67
End: 2022-07-19

## 2022-07-19 VITALS
SYSTOLIC BLOOD PRESSURE: 128 MMHG | WEIGHT: 209 LBS | HEART RATE: 67 BPM | DIASTOLIC BLOOD PRESSURE: 76 MMHG | BODY MASS INDEX: 35.68 KG/M2 | HEIGHT: 64 IN

## 2022-07-19 DIAGNOSIS — I10 ESSENTIAL HYPERTENSION: ICD-10-CM

## 2022-07-19 DIAGNOSIS — E66.01 CLASS 2 SEVERE OBESITY DUE TO EXCESS CALORIES WITH SERIOUS COMORBIDITY AND BODY MASS INDEX (BMI) OF 36.0 TO 36.9 IN ADULT: ICD-10-CM

## 2022-07-19 DIAGNOSIS — Z95.5 HISTORY OF CORONARY ARTERY STENT PLACEMENT: ICD-10-CM

## 2022-07-19 DIAGNOSIS — E11.8 TYPE 2 DIABETES MELLITUS WITH COMPLICATION, WITHOUT LONG-TERM CURRENT USE OF INSULIN: ICD-10-CM

## 2022-07-19 DIAGNOSIS — I25.10 CORONARY ARTERY DISEASE INVOLVING NATIVE CORONARY ARTERY OF NATIVE HEART WITHOUT ANGINA PECTORIS: Primary | ICD-10-CM

## 2022-07-19 PROCEDURE — 93000 ELECTROCARDIOGRAM COMPLETE: CPT | Performed by: INTERNAL MEDICINE

## 2022-07-19 PROCEDURE — 99214 OFFICE O/P EST MOD 30 MIN: CPT | Performed by: INTERNAL MEDICINE

## 2022-07-30 DIAGNOSIS — I10 ESSENTIAL HYPERTENSION: ICD-10-CM

## 2022-08-01 RX ORDER — AMLODIPINE BESYLATE 5 MG/1
5 TABLET ORAL DAILY
Qty: 90 TABLET | Refills: 1 | Status: SHIPPED | OUTPATIENT
Start: 2022-08-01 | End: 2023-02-06

## 2022-08-03 DIAGNOSIS — E11.8 TYPE 2 DIABETES MELLITUS WITH COMPLICATION, WITHOUT LONG-TERM CURRENT USE OF INSULIN: ICD-10-CM

## 2022-08-03 RX ORDER — METFORMIN HYDROCHLORIDE 500 MG/1
500 TABLET, EXTENDED RELEASE ORAL
Qty: 90 TABLET | Refills: 1 | Status: SHIPPED | OUTPATIENT
Start: 2022-08-03 | End: 2022-12-14 | Stop reason: SDUPTHER

## 2022-08-17 RX ORDER — ISOSORBIDE MONONITRATE 30 MG/1
TABLET, EXTENDED RELEASE ORAL
Qty: 90 TABLET | Refills: 1 | Status: SHIPPED | OUTPATIENT
Start: 2022-08-17 | End: 2023-02-16

## 2022-08-17 NOTE — TELEPHONE ENCOUNTER
Rx Refill Note  Requested Prescriptions     Pending Prescriptions Disp Refills   • isosorbide mononitrate (IMDUR) 30 MG 24 hr tablet [Pharmacy Med Name: ISOSORBIDE MONONITRATE 30MG ER TABS] 90 tablet 1     Sig: TAKE 1 TABLET BY MOUTH DAILY      Last office visit with prescribing clinician: 6/8/2022      Next office visit with prescribing clinician: 12/14/2022    Magali Mc LPN  08/17/22, 11:00 EDT

## 2022-09-12 DIAGNOSIS — E78.49 OTHER HYPERLIPIDEMIA: ICD-10-CM

## 2022-09-13 RX ORDER — ROSUVASTATIN CALCIUM 20 MG/1
20 TABLET, COATED ORAL DAILY
Qty: 90 TABLET | Refills: 2 | Status: SHIPPED | OUTPATIENT
Start: 2022-09-13 | End: 2023-03-14

## 2022-10-11 DIAGNOSIS — I10 ESSENTIAL HYPERTENSION: ICD-10-CM

## 2022-10-11 RX ORDER — LOSARTAN POTASSIUM 100 MG/1
100 TABLET ORAL DAILY
Qty: 90 TABLET | Refills: 1 | Status: SHIPPED | OUTPATIENT
Start: 2022-10-11 | End: 2023-01-18

## 2022-11-07 ENCOUNTER — LAB (OUTPATIENT)
Dept: LAB | Facility: HOSPITAL | Age: 67
End: 2022-11-07

## 2022-11-07 ENCOUNTER — CLINICAL SUPPORT (OUTPATIENT)
Dept: FAMILY MEDICINE CLINIC | Age: 67
End: 2022-11-07

## 2022-11-07 ENCOUNTER — TRANSCRIBE ORDERS (OUTPATIENT)
Dept: ADMINISTRATIVE | Facility: HOSPITAL | Age: 67
End: 2022-11-07

## 2022-11-07 DIAGNOSIS — N18.31 CHRONIC KIDNEY DISEASE (CKD) STAGE G3A/A1, MODERATELY DECREASED GLOMERULAR FILTRATION RATE (GFR) BETWEEN 45-59 ML/MIN/1.73 SQUARE METER AND ALBUMINURIA CREATININE RATIO LESS THAN 30 MG/G (CMS/H*: Primary | ICD-10-CM

## 2022-11-07 DIAGNOSIS — N18.31 CHRONIC KIDNEY DISEASE (CKD) STAGE G3A/A1, MODERATELY DECREASED GLOMERULAR FILTRATION RATE (GFR) BETWEEN 45-59 ML/MIN/1.73 SQUARE METER AND ALBUMINURIA CREATININE RATIO LESS THAN 30 MG/G (CMS/H*: ICD-10-CM

## 2022-11-07 DIAGNOSIS — Z23 NEED FOR INFLUENZA VACCINATION: Primary | ICD-10-CM

## 2022-11-07 LAB
BACTERIA UR QL AUTO: NORMAL /HPF
BASOPHILS # BLD AUTO: 0.04 10*3/MM3 (ref 0–0.2)
BASOPHILS NFR BLD AUTO: 0.5 % (ref 0–1.5)
BILIRUB UR QL STRIP: NEGATIVE
CLARITY UR: CLEAR
COLOR UR: YELLOW
CREAT UR-MCNC: 27.3 MG/DL
DEPRECATED RDW RBC AUTO: 45.5 FL (ref 37–54)
EOSINOPHIL # BLD AUTO: 0.24 10*3/MM3 (ref 0–0.4)
EOSINOPHIL NFR BLD AUTO: 3.1 % (ref 0.3–6.2)
ERYTHROCYTE [DISTWIDTH] IN BLOOD BY AUTOMATED COUNT: 13.3 % (ref 12.3–15.4)
GLUCOSE UR STRIP-MCNC: NEGATIVE MG/DL
HCT VFR BLD AUTO: 42.7 % (ref 34–46.6)
HGB BLD-MCNC: 13.3 G/DL (ref 12–15.9)
HGB UR QL STRIP.AUTO: ABNORMAL
IMM GRANULOCYTES # BLD AUTO: 0 10*3/MM3 (ref 0–0.05)
IMM GRANULOCYTES NFR BLD AUTO: 0 % (ref 0–0.5)
KETONES UR QL STRIP: NEGATIVE
LEUKOCYTE ESTERASE UR QL STRIP.AUTO: NEGATIVE
LYMPHOCYTES # BLD AUTO: 2.05 10*3/MM3 (ref 0.7–3.1)
LYMPHOCYTES NFR BLD AUTO: 26.8 % (ref 19.6–45.3)
MCH RBC QN AUTO: 28.2 PG (ref 26.6–33)
MCHC RBC AUTO-ENTMCNC: 31.1 G/DL (ref 31.5–35.7)
MCV RBC AUTO: 90.7 FL (ref 79–97)
MONOCYTES # BLD AUTO: 0.77 10*3/MM3 (ref 0.1–0.9)
MONOCYTES NFR BLD AUTO: 10.1 % (ref 5–12)
NEUTROPHILS NFR BLD AUTO: 4.54 10*3/MM3 (ref 1.7–7)
NEUTROPHILS NFR BLD AUTO: 59.5 % (ref 42.7–76)
NITRITE UR QL STRIP: NEGATIVE
PH UR STRIP.AUTO: 6.5 [PH] (ref 5–8)
PLATELET # BLD AUTO: 285 10*3/MM3 (ref 140–450)
PMV BLD AUTO: 10 FL (ref 6–12)
PROT ?TM UR-MCNC: 4.2 MG/DL
PROT UR QL STRIP: NEGATIVE
PROT/CREAT UR: 0.15 MG/G{CREAT}
RBC # BLD AUTO: 4.71 10*6/MM3 (ref 3.77–5.28)
RBC # UR STRIP: NORMAL /HPF
REF LAB TEST METHOD: NORMAL
SP GR UR STRIP: 1.01 (ref 1–1.03)
SQUAMOUS #/AREA URNS HPF: NORMAL /HPF
UROBILINOGEN UR QL STRIP: ABNORMAL
WBC # UR STRIP: NORMAL /HPF
WBC NRBC COR # BLD: 7.64 10*3/MM3 (ref 3.4–10.8)

## 2022-11-07 PROCEDURE — 90662 IIV NO PRSV INCREASED AG IM: CPT | Performed by: FAMILY MEDICINE

## 2022-11-07 PROCEDURE — 82570 ASSAY OF URINE CREATININE: CPT

## 2022-11-07 PROCEDURE — G0008 ADMIN INFLUENZA VIRUS VAC: HCPCS | Performed by: FAMILY MEDICINE

## 2022-11-07 PROCEDURE — 84156 ASSAY OF PROTEIN URINE: CPT

## 2022-11-07 PROCEDURE — 36415 COLL VENOUS BLD VENIPUNCTURE: CPT

## 2022-11-07 PROCEDURE — 80048 BASIC METABOLIC PNL TOTAL CA: CPT

## 2022-11-07 PROCEDURE — 85025 COMPLETE CBC W/AUTO DIFF WBC: CPT

## 2022-11-07 PROCEDURE — 81001 URINALYSIS AUTO W/SCOPE: CPT

## 2022-11-08 LAB
ANION GAP SERPL CALCULATED.3IONS-SCNC: 10 MMOL/L (ref 5–15)
BUN SERPL-MCNC: 18 MG/DL (ref 8–23)
BUN/CREAT SERPL: 15.5 (ref 7–25)
CALCIUM SPEC-SCNC: 9.9 MG/DL (ref 8.6–10.5)
CHLORIDE SERPL-SCNC: 101 MMOL/L (ref 98–107)
CO2 SERPL-SCNC: 24 MMOL/L (ref 22–29)
CREAT SERPL-MCNC: 1.16 MG/DL (ref 0.57–1)
EGFRCR SERPLBLD CKD-EPI 2021: 51.5 ML/MIN/1.73
GLUCOSE SERPL-MCNC: 99 MG/DL (ref 65–99)
POTASSIUM SERPL-SCNC: 3.9 MMOL/L (ref 3.5–5.2)
SODIUM SERPL-SCNC: 135 MMOL/L (ref 136–145)

## 2022-12-14 ENCOUNTER — LAB (OUTPATIENT)
Dept: LAB | Facility: HOSPITAL | Age: 67
End: 2022-12-14

## 2022-12-14 ENCOUNTER — OFFICE VISIT (OUTPATIENT)
Dept: FAMILY MEDICINE CLINIC | Age: 67
End: 2022-12-14

## 2022-12-14 VITALS
RESPIRATION RATE: 16 BRPM | WEIGHT: 210.8 LBS | SYSTOLIC BLOOD PRESSURE: 134 MMHG | BODY MASS INDEX: 35.99 KG/M2 | HEIGHT: 64 IN | HEART RATE: 67 BPM | DIASTOLIC BLOOD PRESSURE: 76 MMHG

## 2022-12-14 DIAGNOSIS — Z23 IMMUNIZATION DUE: ICD-10-CM

## 2022-12-14 DIAGNOSIS — Z00.00 ROUTINE GENERAL MEDICAL EXAMINATION AT A HEALTH CARE FACILITY: Primary | ICD-10-CM

## 2022-12-14 DIAGNOSIS — Z12.31 SCREENING MAMMOGRAM FOR BREAST CANCER: ICD-10-CM

## 2022-12-14 DIAGNOSIS — I10 ESSENTIAL HYPERTENSION: ICD-10-CM

## 2022-12-14 DIAGNOSIS — E11.8 TYPE 2 DIABETES MELLITUS WITH COMPLICATION, WITHOUT LONG-TERM CURRENT USE OF INSULIN: ICD-10-CM

## 2022-12-14 DIAGNOSIS — E78.49 OTHER HYPERLIPIDEMIA: ICD-10-CM

## 2022-12-14 DIAGNOSIS — Z12.11 COLON CANCER SCREENING: ICD-10-CM

## 2022-12-14 DIAGNOSIS — Z78.0 POSTMENOPAUSAL: ICD-10-CM

## 2022-12-14 LAB
ALBUMIN SERPL-MCNC: 4.3 G/DL (ref 3.5–5.2)
ALBUMIN/GLOB SERPL: 1.2 G/DL
ALP SERPL-CCNC: 71 U/L (ref 39–117)
ALT SERPL W P-5'-P-CCNC: 20 U/L (ref 1–33)
ANION GAP SERPL CALCULATED.3IONS-SCNC: 12.4 MMOL/L (ref 5–15)
AST SERPL-CCNC: 21 U/L (ref 1–32)
BILIRUB SERPL-MCNC: 0.4 MG/DL (ref 0–1.2)
BUN SERPL-MCNC: 19 MG/DL (ref 8–23)
BUN/CREAT SERPL: 16.4 (ref 7–25)
CALCIUM SPEC-SCNC: 10.2 MG/DL (ref 8.6–10.5)
CHLORIDE SERPL-SCNC: 103 MMOL/L (ref 98–107)
CHOLEST SERPL-MCNC: 128 MG/DL (ref 0–200)
CO2 SERPL-SCNC: 23.6 MMOL/L (ref 22–29)
CREAT SERPL-MCNC: 1.16 MG/DL (ref 0.57–1)
EGFRCR SERPLBLD CKD-EPI 2021: 51.5 ML/MIN/1.73
GLOBULIN UR ELPH-MCNC: 3.6 GM/DL
GLUCOSE SERPL-MCNC: 130 MG/DL (ref 65–99)
HBA1C MFR BLD: 6.9 % (ref 4.8–5.6)
HDLC SERPL-MCNC: 35 MG/DL (ref 40–60)
LDLC SERPL CALC-MCNC: 69 MG/DL (ref 0–100)
LDLC/HDLC SERPL: 1.87 {RATIO}
POTASSIUM SERPL-SCNC: 4.1 MMOL/L (ref 3.5–5.2)
PROT SERPL-MCNC: 7.9 G/DL (ref 6–8.5)
SODIUM SERPL-SCNC: 139 MMOL/L (ref 136–145)
TRIGL SERPL-MCNC: 137 MG/DL (ref 0–150)
VLDLC SERPL-MCNC: 24 MG/DL (ref 5–40)

## 2022-12-14 PROCEDURE — 90677 PCV20 VACCINE IM: CPT | Performed by: NURSE PRACTITIONER

## 2022-12-14 PROCEDURE — G0439 PPPS, SUBSEQ VISIT: HCPCS | Performed by: NURSE PRACTITIONER

## 2022-12-14 PROCEDURE — 80053 COMPREHEN METABOLIC PANEL: CPT

## 2022-12-14 PROCEDURE — 36415 COLL VENOUS BLD VENIPUNCTURE: CPT

## 2022-12-14 PROCEDURE — 1159F MED LIST DOCD IN RCRD: CPT | Performed by: NURSE PRACTITIONER

## 2022-12-14 PROCEDURE — 1170F FXNL STATUS ASSESSED: CPT | Performed by: NURSE PRACTITIONER

## 2022-12-14 PROCEDURE — 1126F AMNT PAIN NOTED NONE PRSNT: CPT | Performed by: NURSE PRACTITIONER

## 2022-12-14 PROCEDURE — 80061 LIPID PANEL: CPT

## 2022-12-14 PROCEDURE — 83036 HEMOGLOBIN GLYCOSYLATED A1C: CPT

## 2022-12-14 PROCEDURE — G0009 ADMIN PNEUMOCOCCAL VACCINE: HCPCS | Performed by: NURSE PRACTITIONER

## 2022-12-14 RX ORDER — METFORMIN HYDROCHLORIDE 500 MG/1
500 TABLET, EXTENDED RELEASE ORAL
Qty: 90 TABLET | Refills: 1 | Status: SHIPPED | OUTPATIENT
Start: 2022-12-14

## 2022-12-14 RX ORDER — PSEUDOEPHEDRINE HCL 30 MG
100 TABLET ORAL
COMMUNITY

## 2022-12-14 NOTE — PROGRESS NOTES
The ABCs of the Annual Wellness Visit  Welcome to Medicare Visit    Subjective     Jesse Espinal is a 67 y.o. female who presents for a  Welcome to Medicare Visit.    Medicare wellness HPI  Exercises regularly: no   Eats healthy: not now   Last mammogram:  Last DEXA:none   Last pap smear:n/a   BSE: yes   Wears seatbelts: yes   Living will: no, booklet   Optometrist:lyric SPRING  Dentist: CLEMENTINE Wall   Tobacco: quit in    Alcohol intake: socially   Drugs: no   Falls: fell in shower in , has made some changes to help prevent  Colonoscopy:  diverticulosis and hemorrhoids   Immunizations:zostavac in , due covid booster dose     Diabetes:  Current medication: metformin, [facility]   Tolerating medication: Yes  Last eye exam: May at [place] 2022  Last foot exam: > 1 year   At home BS ranges:  140's   Lab Results   Component Value Date    HGBA1C 6.80 (H) 2022     Hypertension:  Current medication: losartan, norvasc, toprol and HCTZ   Tolerating Medication: Yes   Checking BP at home and it is: 130's 80's or lower   Needs refills: none   Labs:  Lab Results   Component Value Date    GLUCOSE 99 2022    BUN 18 2022    CREATININE 1.16 (H) 2022    EGFRIFNONA 46 (L) 2021    BCR 15.5 2022    K 3.9 2022    CO2 24.0 2022    CALCIUM 9.9 2022    PROTENTOTREF 7.6 2022    ALBUMIN 4.60 2022    LABIL2 1.0 2022    AST 26 2022    ALT 28 2022         PAST MEDICAL HISTORY changes since 2022      COVID + , mild ( was in hospital)         GYNECOLOGICAL HISTORY:             CURRENT MEDICAL PROVIDERS:    Cardiologist: Dr Dailey         PREVENTIVE HEALTH MAINTENANCE             Hepatitis C Medicare Screening: was last done ; negative         Surgical History:         Appendectomy    Cholecystectomy    Hysterectomy     Coronary Artery Stent Placement: 13 and 14 LAD; Procedures: colonoscopy  13, diverticulosis and internal hemorrhoids heart cath , severe 3 vessel CAD     Procedures: cystoscopy 15         Family History:     Father:  at age 45; Cause of death was stroke;  liver disease     Mother:  at age 75; Cause of death was colon;  Coronary Artery Disease; Hypertension;  Breast Cancer; Colon Cancer;  Type 2 Diabetes     Brother(s): 4 brother(s) total; 2 ;  Coronary Artery Disease;  Lung Cancer     Sister(s): 3 sister(s) total; 2 ;  Coronary Artery Disease; Hyperlipidemia; Neurological/Genetic Cerebrovascular Accident (  70 );  pneumonia     Son(s): 3 son(s) total;  Hypertension; Hyperlipidemia         Social History:     Occupation:[employer]       Marital Status:      Children: 3 children          The following portions of the patient's history were reviewed and   updated as appropriate: allergies, current medications, past family history, past medical history, past social history, past surgical history and problem list.     Compared to one year ago, the patient feels her physical   health is the same.    Compared to one year ago, the patient feels her mental   health is the same.    Recent Hospitalizations:  She was not admitted to the hospital during the last year.       Current Medical Providers:  Patient Care Team:  Quyen Farah APRN as PCP - General (Family Medicine)    Outpatient Medications Prior to Visit   Medication Sig Dispense Refill   • amLODIPine (NORVASC) 5 MG tablet TAKE 1 TABLET BY MOUTH DAILY 90 tablet 1   • docusate sodium 100 MG capsule Take 100 mg by mouth.     • glucose blood (OneTouch Ultra) test strip 1 each by Other route See Admin Instructions. Use once to twice daily as needed 100 each 5   • hydroCHLOROthiazide (HYDRODIURIL) 12.5 MG tablet Take 12.5 mg by mouth Every Night.     • isosorbide mononitrate (IMDUR) 30 MG 24 hr tablet TAKE 1 TABLET BY MOUTH DAILY 90 tablet 1   • losartan (COZAAR) 100 MG tablet  TAKE 1 TABLET BY MOUTH DAILY 90 tablet 1   • metoprolol succinate XL (TOPROL-XL) 100 MG 24 hr tablet TAKE 1 TABLET(100 MG) BY MOUTH EVERY DAY 90 tablet 1   • rosuvastatin (CRESTOR) 20 MG tablet TAKE 1 TABLET BY MOUTH DAILY 90 tablet 2   • ticagrelor (Brilinta) 60 MG tablet tablet Take 1 tablet by mouth Every 12 (Twelve) Hours. 180 tablet 2   • metFORMIN ER (GLUCOPHAGE-XR) 500 MG 24 hr tablet TAKE 1 TABLET BY MOUTH DAILY WITH BREAKFAST 90 tablet 1   • aspirin 81 MG chewable tablet Chew 81 mg Daily.       No facility-administered medications prior to visit.       No opioid medication identified on active medication list. I have reviewed chart for other potential  high risk medication/s and harmful drug interactions in the elderly.          Aspirin is on active medication list. Aspirin use is not indicated based on review of current medical condition/s. Risk of harm outweighs potential benefits. Patient instructed to discontinue this medication.  .      Patient Active Problem List   Diagnosis   • Coronary artery disease involving native coronary artery of native heart without angina pectoris   • History of coronary artery stent placement   • Other hyperlipidemia   • Type 2 diabetes mellitus with complication, without long-term current use of insulin ([facility])   • Class 2 severe obesity due to excess calories with serious comorbidity and body mass index (BMI) of 36.0 to 36.9 in adult ([facility])   • Hypertension   • Heart murmur   • Stage 3a chronic kidney disease (CKD) ([facility])   • Swelling of both lower extremities   • Immunization due   • Essential hypertension   • History of COVID-19   • Routine general medical examination at a health care facility   • Postmenopausal   • Screening mammogram for breast cancer   • Colon cancer screening     Advance Care Planning  Advance Directive is not on file.  ACP discussion was held with the patient during this visit. Patient does not have an advance directive, information provided.      "  Objective   Vitals:    22 0924   BP: 134/76   BP Location: Right arm   Patient Position: Sitting   Cuff Size: Large Adult   Pulse: 67   Resp: 16   Weight: 95.6 kg (210 lb 12.8 oz)   Height: 162.6 cm (64\")   PainSc: 0-No pain     Estimated body mass index is 36.18 kg/m² as calculated from the following:    Height as of this encounter: 162.6 cm (64\").    Weight as of this encounter: 95.6 kg (210 lb 12.8 oz).    Class 2 Severe Obesity (BMI >=35 and <=39.9). Obesity-related health conditions include the following: hypertension, coronary heart disease and diabetes mellitus. Obesity is unchanged. BMI is is above average; BMI management plan is completed. We discussed portion control and increasing exercise.      Does the patient have evidence of cognitive impairment?   No         Procedures       HEALTH RISK ASSESSMENT    Smoking Status:  Social History     Tobacco Use   Smoking Status Former   • Packs/day: 2.50   • Years: 40.00   • Pack years: 100.00   • Types: Cigarettes   • Quit date:    • Years since quittin.9   Smokeless Tobacco Never   Tobacco Comments    CAFFEINE USE: 4- 8OZ GLASSES DAILY/ 2 CUPS COFFEE WKLY     Alcohol Consumption:  Social History     Substance and Sexual Activity   Alcohol Use Yes   • Alcohol/week: 1.0 standard drink   • Types: 1 Glasses of wine per week    Comment: occ       Fall Risk Screen:    OLIVEADI Fall Risk Assessment was completed, and patient is at HIGH risk for falls. Assessment completed on:2022    Depression Screen:   PHQ-2/PHQ-9 Depression Screening 2022   Retired PHQ-9 Total Score -   Retired Total Score -   Little Interest or Pleasure in Doing Things 0-->not at all   Feeling Down, Depressed or Hopeless 0-->not at all   PHQ-9: Brief Depression Severity Measure Score 0       Health Habits and Functional and Cognitive Screening:  Functional & Cognitive Status 2022   Do you have difficulty preparing food and eating? No   Do you have difficulty bathing " yourself, getting dressed or grooming yourself? No   Do you have difficulty using the toilet? No   Do you have difficulty moving around from place to place? No   Do you have trouble with steps or getting out of a bed or a chair? No   Current Diet Unhealthy Diet   Dental Exam Up to date   Eye Exam Up to date   Current Exercises Include No Regular Exercise   Do you need help using the phone?  No   Are you deaf or do you have serious difficulty hearing?  Yes   Do you need help with transportation? No   Do you need help shopping? No   Do you need help preparing meals?  No   Do you need help with housework?  No   Do you need help with laundry? No   Do you need help taking your medications? No   Do you need help managing money? No   Do you ever drive or ride in a car without wearing a seat belt? No   Have you felt unusual stress, anger or loneliness in the last month? No   Who do you live with? Spouse   If you need help, do you have trouble finding someone available to you? No   Have you been bothered in the last four weeks by sexual problems? No   Do you have difficulty concentrating, remembering or making decisions? No       Visual Acuity:    No results found.    Age-appropriate Screening Schedule:  Refer to the list below for future screening recommendations based on patient's age, sex and/or medical conditions. Orders for these recommended tests are listed in the plan section. The patient has been provided with a written plan.    Health Maintenance   Topic Date Due   • DXA SCAN  Never done   • TDAP/TD VACCINES (1 - Tdap) Never done   • ZOSTER VACCINE (1 of 2) Never done   • PAP SMEAR  Never done   • MAMMOGRAM  02/10/2022   • HEMOGLOBIN A1C  12/08/2022   • DIABETIC EYE EXAM  05/04/2023   • LIPID PANEL  06/08/2023   • URINE MICROALBUMIN  11/07/2023   • DIABETIC FOOT EXAM  12/14/2023   • INFLUENZA VACCINE  Completed        CMS Preventative Services Quick Reference  Risk Factors Identified During  "Encounter    Immunizations Discussed/Encouraged: Tdap, Prevnar 20 (Pneumococcal 20-valent conjugate), Shingrix and COVID19  The above risks/problems have been discussed with the patient.  Pertinent information has been shared with the patient in the After Visit Summary.    Follow Up:   Initial Medicare Visit in one year    An After Visit Summary and PPPS were made available to the patient.      Additional E&M Note during same encounter follows:  Patient has multiple medical problems which are significant and separately identifiable that require additional work above and beyond the Medicare Wellness Visit.      Chief Complaint  Medicare Wellness-Initial Visit    Subjective        HPI  Jesse Espinal is also being seen today for medicare wellness     Review of Systems   Constitutional: Negative for fatigue and fever.   HENT: Negative for sore throat.    Eyes: Negative for visual disturbance.   Respiratory: Negative for cough and shortness of breath.    Cardiovascular: Negative for chest pain, palpitations and leg swelling.   Gastrointestinal: Negative for abdominal pain.   Genitourinary: Negative for difficulty urinating.   Musculoskeletal: Negative for arthralgias.   Skin: Negative for rash.   Hematological: Negative for adenopathy.   Psychiatric/Behavioral: Negative for sleep disturbance.       Objective   Vital Signs:  /76 (BP Location: Right arm, Patient Position: Sitting, Cuff Size: Large Adult)   Pulse 67   Resp 16   Ht 162.6 cm (64\")   Wt 95.6 kg (210 lb 12.8 oz)   BMI 36.18 kg/m²     Physical Exam  Constitutional:       Appearance: Normal appearance.   HENT:      Right Ear: Tympanic membrane normal.      Left Ear: Tympanic membrane normal.      Mouth/Throat:      Pharynx: Oropharynx is clear.   Eyes:      Pupils: Pupils are equal, round, and reactive to light.   Neck:      Vascular: No carotid bruit.   Cardiovascular:      Rate and Rhythm: Normal rate and regular rhythm.      Pulses:           " Posterior tibial pulses are 2+ on the right side and 2+ on the left side.      Heart sounds: No murmur heard.  Pulmonary:      Effort: No respiratory distress.      Breath sounds: Normal breath sounds.   Chest:   Breasts:     Right: Normal. No mass, nipple discharge or skin change.      Left: Normal. No mass, nipple discharge or skin change.   Abdominal:      Palpations: Abdomen is soft.      Tenderness: There is no abdominal tenderness.   Musculoskeletal:         General: No swelling.   Feet:      Right foot:      Protective Sensation: 3 sites tested. 3 sites sensed.      Skin integrity: Dry skin present. No ulcer or blister.      Toenail Condition: Right toenails are normal.      Left foot:      Protective Sensation: 3 sites tested. 3 sites sensed.      Skin integrity: Dry skin present. No ulcer or blister.      Toenail Condition: Left toenails are normal.      Comments: Diabetic Foot Exam Performed and Monofilament Test Performed     Lymphadenopathy:      Upper Body:      Right upper body: No axillary adenopathy.      Left upper body: No axillary adenopathy.   Skin:     General: Skin is warm and dry.   Neurological:      General: No focal deficit present.      Mental Status: She is alert.   Psychiatric:         Mood and Affect: Mood normal.         Thought Content: Thought content normal.                      Assessment and Plan   Diagnoses and all orders for this visit:    1. Routine general medical examination at a health care facility (Primary)  Assessment & Plan:  Advise regular exercise, healthy eating, always wear seat belts. Living will discussed, booklet given, fall prevention discussed.  Immunizations discussed.   To continue yearly optometry and dental exams.          2. Postmenopausal  Assessment & Plan:  Set up DEXA     Orders:  -     DEXA Bone Density Axial; Future    3. Screening mammogram for breast cancer  Assessment & Plan:  Continue monthly BSE, set up mammogram     Orders:  -     Mammo  Screening Digital Tomosynthesis Bilateral With CAD; Future    4. Other hyperlipidemia  Assessment & Plan:  Continue current medication and efforts with diet and exercise.   See cardiology as directed       5. Type 2 diabetes mellitus with complication, without long-term current use of insulin (HCC)  Assessment & Plan:  To work on diet, regular exercise, monitor sugars, check feet daily, see optometrist yearly or as directed.  Send for her last optometry note Remi 5-2022    Orders:  -     Comprehensive Metabolic Panel; Future  -     Lipid Panel; Future  -     Hemoglobin A1c; Future  -     metFORMIN ER (GLUCOPHAGE-XR) 500 MG 24 hr tablet; Take 1 tablet by mouth Daily With Breakfast.  Dispense: 90 tablet; Refill: 1    6. Immunization due  Assessment & Plan:  Will update tooday with pneum 20 +  Advised to check on tdap and shingrex and to get covid booster     Orders:  -     Pneumococcal Conjugate Vaccine 20-Valent (PCV20)    7. Colon cancer screening  Assessment & Plan:  Will set up her screening for after 4-23-23    Orders:  -     Ambulatory Referral to Gastroenterology    8. Essential hypertension  Assessment & Plan:  Hypertension is stable. Continue to monitor BP at home. Continue current meds. Continue to modify diet and lifestyle. Will need labs every 6 months and follow up.             Follow Up   Return for followup pending lab results.  Patient was given instructions and counseling regarding her condition or for health maintenance advice. Please see specific information pulled into the AVS if appropriate.

## 2022-12-14 NOTE — ASSESSMENT & PLAN NOTE
Advise regular exercise, healthy eating, always wear seat belts. Living will discussed, booklet given, fall prevention discussed.  Immunizations discussed.   To continue yearly optometry and dental exams.

## 2022-12-14 NOTE — ASSESSMENT & PLAN NOTE
To work on diet, regular exercise, monitor sugars, check feet daily, see optometrist yearly or as directed.  Send for her last optometry note Remi 5-2022

## 2022-12-14 NOTE — ASSESSMENT & PLAN NOTE
Will update tooday with pneum 20 +  Advised to check on tdap and shingrex and to get covid booster

## 2022-12-21 ENCOUNTER — HOSPITAL ENCOUNTER (OUTPATIENT)
Dept: MAMMOGRAPHY | Facility: HOSPITAL | Age: 67
Discharge: HOME | End: 2022-12-21

## 2022-12-21 ENCOUNTER — HOSPITAL ENCOUNTER (OUTPATIENT)
Dept: BONE DENSITY | Facility: HOSPITAL | Age: 67
Discharge: HOME | End: 2022-12-21

## 2022-12-21 DIAGNOSIS — Z78.0 POSTMENOPAUSAL: ICD-10-CM

## 2022-12-21 DIAGNOSIS — Z12.31 SCREENING MAMMOGRAM FOR BREAST CANCER: ICD-10-CM

## 2022-12-21 PROCEDURE — 77067 SCR MAMMO BI INCL CAD: CPT

## 2022-12-21 PROCEDURE — 77063 BREAST TOMOSYNTHESIS BI: CPT

## 2022-12-21 PROCEDURE — 77080 DXA BONE DENSITY AXIAL: CPT

## 2023-01-06 RX ORDER — TICAGRELOR 60 MG/1
TABLET ORAL
Qty: 180 TABLET | Refills: 2 | Status: SHIPPED | OUTPATIENT
Start: 2023-01-06

## 2023-01-18 DIAGNOSIS — I10 ESSENTIAL HYPERTENSION: ICD-10-CM

## 2023-01-18 RX ORDER — LOSARTAN POTASSIUM 100 MG/1
100 TABLET ORAL DAILY
Qty: 90 TABLET | Refills: 1 | Status: SHIPPED | OUTPATIENT
Start: 2023-01-18

## 2023-01-25 DIAGNOSIS — I10 ESSENTIAL HYPERTENSION: ICD-10-CM

## 2023-01-25 RX ORDER — METOPROLOL SUCCINATE 100 MG/1
TABLET, EXTENDED RELEASE ORAL
Qty: 90 TABLET | Refills: 1 | Status: SHIPPED | OUTPATIENT
Start: 2023-01-25

## 2023-01-31 DIAGNOSIS — E11.8 TYPE 2 DIABETES MELLITUS WITH COMPLICATION, WITHOUT LONG-TERM CURRENT USE OF INSULIN: ICD-10-CM

## 2023-02-04 DIAGNOSIS — I10 ESSENTIAL HYPERTENSION: ICD-10-CM

## 2023-02-06 RX ORDER — AMLODIPINE BESYLATE 5 MG/1
5 TABLET ORAL DAILY
Qty: 90 TABLET | Refills: 1 | Status: SHIPPED | OUTPATIENT
Start: 2023-02-06

## 2023-02-06 NOTE — TELEPHONE ENCOUNTER
Rx Refill Note  Requested Prescriptions     Pending Prescriptions Disp Refills   • amLODIPine (NORVASC) 5 MG tablet [Pharmacy Med Name: AMLODIPINE BESYLATE 5MG TABLETS] 90 tablet 1     Sig: TAKE 1 TABLET BY MOUTH DAILY      Last office visit with prescribing clinician: 12/14/2022     Next office visit with prescribing clinician: 6/14/23        Cookie Hancock LPN  02/06/23, 09:27 EST

## 2023-02-16 RX ORDER — ISOSORBIDE MONONITRATE 30 MG/1
TABLET, EXTENDED RELEASE ORAL
Qty: 90 TABLET | Refills: 1 | Status: SHIPPED | OUTPATIENT
Start: 2023-02-16

## 2023-02-16 NOTE — TELEPHONE ENCOUNTER
Rx Refill Note  Requested Prescriptions     Pending Prescriptions Disp Refills   • isosorbide mononitrate (IMDUR) 30 MG 24 hr tablet [Pharmacy Med Name: ISOSORBIDE MONONITRATE 30MG ER TABS] 90 tablet 1     Sig: TAKE 1 TABLET BY MOUTH DAILY      Last office visit with prescribing clinician: 12/14/2022     Next office visit with prescribing clinician: 6/14/2023   Cookie Hancock LPN  02/16/23, 09:15 EST

## 2023-03-13 DIAGNOSIS — E78.49 OTHER HYPERLIPIDEMIA: ICD-10-CM

## 2023-03-14 RX ORDER — ROSUVASTATIN CALCIUM 20 MG/1
20 TABLET, COATED ORAL DAILY
Qty: 90 TABLET | Refills: 1 | Status: SHIPPED | OUTPATIENT
Start: 2023-03-14

## 2023-04-27 DIAGNOSIS — I10 ESSENTIAL HYPERTENSION: ICD-10-CM

## 2023-04-27 RX ORDER — METOPROLOL SUCCINATE 100 MG/1
TABLET, EXTENDED RELEASE ORAL
Qty: 90 TABLET | Refills: 1 | OUTPATIENT
Start: 2023-04-27

## 2023-05-11 DIAGNOSIS — E11.8 TYPE 2 DIABETES MELLITUS WITH COMPLICATION, WITHOUT LONG-TERM CURRENT USE OF INSULIN: ICD-10-CM

## 2023-05-12 RX ORDER — METFORMIN HYDROCHLORIDE 500 MG/1
500 TABLET, EXTENDED RELEASE ORAL
Qty: 90 TABLET | Refills: 1 | Status: SHIPPED | OUTPATIENT
Start: 2023-05-12

## 2023-06-14 ENCOUNTER — OFFICE VISIT (OUTPATIENT)
Dept: FAMILY MEDICINE CLINIC | Age: 68
End: 2023-06-14
Payer: MEDICARE

## 2023-06-14 ENCOUNTER — LAB (OUTPATIENT)
Dept: LAB | Facility: HOSPITAL | Age: 68
End: 2023-06-14
Payer: MEDICARE

## 2023-06-14 VITALS
DIASTOLIC BLOOD PRESSURE: 77 MMHG | HEART RATE: 54 BPM | WEIGHT: 213.4 LBS | SYSTOLIC BLOOD PRESSURE: 126 MMHG | HEIGHT: 64 IN | BODY MASS INDEX: 36.43 KG/M2 | OXYGEN SATURATION: 95 %

## 2023-06-14 DIAGNOSIS — Z95.5 HISTORY OF CORONARY ARTERY STENT PLACEMENT: ICD-10-CM

## 2023-06-14 DIAGNOSIS — E11.8 TYPE 2 DIABETES MELLITUS WITH COMPLICATION, WITHOUT LONG-TERM CURRENT USE OF INSULIN: ICD-10-CM

## 2023-06-14 DIAGNOSIS — I10 ESSENTIAL HYPERTENSION: ICD-10-CM

## 2023-06-14 DIAGNOSIS — N18.31 STAGE 3A CHRONIC KIDNEY DISEASE (CKD): ICD-10-CM

## 2023-06-14 DIAGNOSIS — E78.49 OTHER HYPERLIPIDEMIA: Primary | ICD-10-CM

## 2023-06-14 DIAGNOSIS — E78.49 OTHER HYPERLIPIDEMIA: ICD-10-CM

## 2023-06-14 LAB
ALBUMIN SERPL-MCNC: 4.5 G/DL (ref 3.5–5.2)
ALBUMIN/GLOB SERPL: 1.3 G/DL
ALP SERPL-CCNC: 71 U/L (ref 39–117)
ALT SERPL W P-5'-P-CCNC: 27 U/L (ref 1–33)
ANION GAP SERPL CALCULATED.3IONS-SCNC: 11.3 MMOL/L (ref 5–15)
AST SERPL-CCNC: 25 U/L (ref 1–32)
BILIRUB SERPL-MCNC: 0.5 MG/DL (ref 0–1.2)
BUN SERPL-MCNC: 18 MG/DL (ref 8–23)
BUN/CREAT SERPL: 14.3 (ref 7–25)
CALCIUM SPEC-SCNC: 10.4 MG/DL (ref 8.6–10.5)
CHLORIDE SERPL-SCNC: 103 MMOL/L (ref 98–107)
CHOLEST SERPL-MCNC: 129 MG/DL (ref 0–200)
CO2 SERPL-SCNC: 23.7 MMOL/L (ref 22–29)
CREAT SERPL-MCNC: 1.26 MG/DL (ref 0.57–1)
EGFRCR SERPLBLD CKD-EPI 2021: 46.3 ML/MIN/1.73
GLOBULIN UR ELPH-MCNC: 3.6 GM/DL
GLUCOSE SERPL-MCNC: 142 MG/DL (ref 65–99)
HBA1C MFR BLD: 7.1 % (ref 4.8–5.6)
HDLC SERPL-MCNC: 35 MG/DL (ref 40–60)
LDLC SERPL CALC-MCNC: 68 MG/DL (ref 0–100)
LDLC/HDLC SERPL: 1.85 {RATIO}
POTASSIUM SERPL-SCNC: 4.3 MMOL/L (ref 3.5–5.2)
PROT SERPL-MCNC: 8.1 G/DL (ref 6–8.5)
SODIUM SERPL-SCNC: 138 MMOL/L (ref 136–145)
TRIGL SERPL-MCNC: 146 MG/DL (ref 0–150)
VLDLC SERPL-MCNC: 26 MG/DL (ref 5–40)

## 2023-06-14 PROCEDURE — 83036 HEMOGLOBIN GLYCOSYLATED A1C: CPT

## 2023-06-14 PROCEDURE — 80061 LIPID PANEL: CPT

## 2023-06-14 PROCEDURE — 80053 COMPREHEN METABOLIC PANEL: CPT

## 2023-06-14 PROCEDURE — 36415 COLL VENOUS BLD VENIPUNCTURE: CPT

## 2023-06-14 NOTE — PROGRESS NOTES
Jesse Espinal presents to [facility].    Chief Complaint:  Hyperlipidemia (6 month ), Hypertension, and Diabetes         History of Present Illness:  Diabetes:  Current medication: metformin 500   Tolerating medication: Yes  Last eye exam:  over one year ago   Last foot exam:   At home BS ranges: 145-160  Refills needed: not out yet / [place]   Lab Results       Component                Value               Date                       HGBA1C                   6.90 (H)            12/14/2022                Hyperlipidemia  Current medication: crestor   Tolerating medication: Yes  Needs Refill: no     Lab Results       Component                Value               Date                       CHOL                     128                 12/14/2022                 CHLPL                    131                 01/26/2021                 TRIG                     137                 12/14/2022                 HDL                      35 (L)              12/14/2022                 LDL                      69                  12/14/2022                Hypertension:  Current medication: norvasc, HCTZ, Toprol XL and losartan   Tolerating Medication: Yes  Checking BP at home and it is: 130's 60-70  Needs refills: no   Labs:  Lab Results       Component                Value               Date                       GLUCOSE                  130 (H)             12/14/2022                 BUN                      19                  12/14/2022                 CREATININE               1.16 (H)            12/14/2022                 EGFRIFNONA               46 (L)              12/08/2021                 BCR                      16.4                12/14/2022                 K                        4.1                 12/14/2022                 CO2                      23.6                12/14/2022                 CALCIUM                  10.2                12/14/2022                  PROTENTOTREF             7.6                 2022                 ALBUMIN                  4.30                2022                 LABIL2                   1.0                 2022                 AST                      21                  2022                 ALT                      20                  2022              PAST MEDICAL HISTORY changes since 2022      COVID + -, mild ( was in hospital)         GYNECOLOGICAL HISTORY:             CURRENT MEDICAL PROVIDERS:    Cardiologist: Dr Dailey         PREVENTIVE HEALTH MAINTENANCE             Hepatitis C Medicare Screening: was last done ; negative         Surgical History:         Appendectomy    Cholecystectomy    Hysterectomy     Coronary Artery Stent Placement: 13 and 14 LAD; Procedures: colonoscopy 13, diverticulosis and internal hemorrhoids heart cath , severe 3 vessel CAD     Procedures: cystoscopy 16         Family History:     Father:  at age 45; Cause of death was stroke;  liver disease     Mother:  at age 75; Cause of death was colon;  Coronary Artery Disease; Hypertension;  Breast Cancer; Colon Cancer;  Type 2 Diabetes     Brother(s): 4 brother(s) total; 2 ;  Coronary Artery Disease;  Lung Cancer     Sister(s): 3 sister(s) total; 2 ;  Coronary Artery Disease; Hyperlipidemia; Neurological/Genetic Cerebrovascular Accident (  70 );  pneumonia     Son(s): 3 son(s) total;  Hypertension; Hyperlipidemia         Social History:     Occupation:retired  [employer]       Marital Status:      Children: 3 children             Review of Systems:  Review of Systems   Constitutional:  Negative for fatigue and fever.   Respiratory:  Negative for cough and shortness of breath.    Cardiovascular:  Negative for chest pain, palpitations and leg swelling.        Current Outpatient Medications:     amLODIPine (NORVASC) 5 MG tablet, TAKE 1 TABLET BY MOUTH  "DAILY, Disp: 90 tablet, Rfl: 1    Brilinta 60 MG tablet tablet, TAKE 1 TABLET BY MOUTH EVERY 12 HOURS, Disp: 180 tablet, Rfl: 2    docusate sodium 100 MG capsule, Take 1 capsule by mouth., Disp: , Rfl:     glucose blood (OneTouch Ultra) test strip, 1 each by Other route See Admin Instructions. Use once to twice daily as needed, Disp: 100 each, Rfl: 3    hydroCHLOROthiazide (HYDRODIURIL) 12.5 MG tablet, Take 1 tablet by mouth Every Night., Disp: , Rfl:     isosorbide mononitrate (IMDUR) 30 MG 24 hr tablet, TAKE 1 TABLET BY MOUTH DAILY, Disp: 90 tablet, Rfl: 1    losartan (COZAAR) 100 MG tablet, TAKE 1 TABLET BY MOUTH DAILY, Disp: 90 tablet, Rfl: 1    metFORMIN ER (GLUCOPHAGE-XR) 500 MG 24 hr tablet, TAKE 1 TABLET BY MOUTH DAILY WITH BREAKFAST, Disp: 90 tablet, Rfl: 1    metoprolol succinate XL (TOPROL-XL) 100 MG 24 hr tablet, TAKE 1 TABLET(100 MG) BY MOUTH EVERY DAY, Disp: 90 tablet, Rfl: 1    rosuvastatin (CRESTOR) 20 MG tablet, TAKE 1 TABLET BY MOUTH DAILY, Disp: 90 tablet, Rfl: 1    Vital Signs:   Vitals:    06/14/23 0904 06/14/23 0925   BP: 146/75 126/77   BP Location: Right arm Right arm   Patient Position: Sitting Sitting   Cuff Size: Adult Adult   Pulse: 67 54   SpO2: 95%  Comment: room air    Weight: 96.8 kg (213 lb 6.4 oz)    Height: 162.6 cm (64.02\")          Physical Exam:  Physical Exam  Vitals reviewed.   Constitutional:       General: She is not in acute distress.     Appearance: Normal appearance.   Neck:      Vascular: No carotid bruit.   Cardiovascular:      Rate and Rhythm: Normal rate and regular rhythm.      Heart sounds: Normal heart sounds. No murmur heard.  Pulmonary:      Effort: Pulmonary effort is normal. No respiratory distress.      Breath sounds: Normal breath sounds.   Musculoskeletal:      Right lower leg: No edema.      Left lower leg: No edema.   Neurological:      Mental Status: She is alert.   Psychiatric:         Mood and Affect: Mood normal.         Behavior: Behavior normal. "       Result Review      The following data was reviewed by: SPIKE Jj on 06/14/2023:    Results for orders placed or performed in visit on 12/14/22   Comprehensive Metabolic Panel    Specimen: Blood   Result Value Ref Range    Glucose 130 (H) 65 - 99 mg/dL    BUN 19 8 - 23 mg/dL    Creatinine 1.16 (H) 0.57 - 1.00 mg/dL    Sodium 139 136 - 145 mmol/L    Potassium 4.1 3.5 - 5.2 mmol/L    Chloride 103 98 - 107 mmol/L    CO2 23.6 22.0 - 29.0 mmol/L    Calcium 10.2 8.6 - 10.5 mg/dL    Total Protein 7.9 6.0 - 8.5 g/dL    Albumin 4.30 3.50 - 5.20 g/dL    ALT (SGPT) 20 1 - 33 U/L    AST (SGOT) 21 1 - 32 U/L    Alkaline Phosphatase 71 39 - 117 U/L    Total Bilirubin 0.4 0.0 - 1.2 mg/dL    Globulin 3.6 gm/dL    A/G Ratio 1.2 g/dL    BUN/Creatinine Ratio 16.4 7.0 - 25.0    Anion Gap 12.4 5.0 - 15.0 mmol/L    eGFR 51.5 (L) >60.0 mL/min/1.73   Lipid Panel    Specimen: Blood   Result Value Ref Range    Total Cholesterol 128 0 - 200 mg/dL    Triglycerides 137 0 - 150 mg/dL    HDL Cholesterol 35 (L) 40 - 60 mg/dL    LDL Cholesterol  69 0 - 100 mg/dL    VLDL Cholesterol 24 5 - 40 mg/dL    LDL/HDL Ratio 1.87    Hemoglobin A1c    Specimen: Blood   Result Value Ref Range    Hemoglobin A1C 6.90 (H) 4.80 - 5.60 %               Assessment and Plan:          Diagnoses and all orders for this visit:    1. Other hyperlipidemia (Primary)  Assessment & Plan:  Continue current medication and efforts with diet and exercise.       Orders:  -     Lipid Panel; Future    2. Essential hypertension  Assessment & Plan:  Hypertension is stable. to monitor BP at home. Continue current meds. Continue to modify diet and lifestyle.       Orders:  -     Comprehensive Metabolic Panel; Future    3. Stage 3a chronic kidney disease (CKD)  Assessment & Plan:  Rechecking labs today       4. Type 2 diabetes mellitus with complication, without long-term current use of insulin  Assessment & Plan:  To work on diet, regular exercise, monitor sugars,  check feet daily, set up optometrist appt  Checking labs today       Orders:  -     Comprehensive Metabolic Panel; Future  -     Lipid Panel; Future  -     Hemoglobin A1c; Future    5. History of coronary artery stent placement  Assessment & Plan:  She has upcoming follow up with cardiology in August             Follow Up   Return for followup pending lab results.  Patient was given instructions and counseling regarding her condition or for health maintenance advice. Please see specific information pulled into the AVS if appropriate.

## 2023-06-14 NOTE — ASSESSMENT & PLAN NOTE
To work on diet, regular exercise, monitor sugars, check feet daily, set up optometrist appt  Checking labs today

## 2023-08-02 ENCOUNTER — OFFICE VISIT (OUTPATIENT)
Dept: CARDIOLOGY | Facility: CLINIC | Age: 68
End: 2023-08-02
Payer: MEDICARE

## 2023-08-02 VITALS
WEIGHT: 211.4 LBS | OXYGEN SATURATION: 97 % | SYSTOLIC BLOOD PRESSURE: 120 MMHG | BODY MASS INDEX: 36.09 KG/M2 | HEIGHT: 64 IN | HEART RATE: 69 BPM | DIASTOLIC BLOOD PRESSURE: 80 MMHG

## 2023-08-02 DIAGNOSIS — E66.01 CLASS 2 SEVERE OBESITY DUE TO EXCESS CALORIES WITH SERIOUS COMORBIDITY AND BODY MASS INDEX (BMI) OF 36.0 TO 36.9 IN ADULT: ICD-10-CM

## 2023-08-02 DIAGNOSIS — I25.10 CORONARY ARTERY DISEASE INVOLVING NATIVE CORONARY ARTERY OF NATIVE HEART WITHOUT ANGINA PECTORIS: Primary | ICD-10-CM

## 2023-08-02 DIAGNOSIS — E11.8 TYPE 2 DIABETES MELLITUS WITH COMPLICATION, WITHOUT LONG-TERM CURRENT USE OF INSULIN: ICD-10-CM

## 2023-08-02 DIAGNOSIS — E78.49 OTHER HYPERLIPIDEMIA: ICD-10-CM

## 2023-08-07 ENCOUNTER — TELEPHONE (OUTPATIENT)
Dept: CARDIOLOGY | Facility: CLINIC | Age: 68
End: 2023-08-07

## 2023-08-07 NOTE — TELEPHONE ENCOUNTER
Caller: Jesse Espinal    Relationship: Self    Best call back number:[phone]     Who are you requesting to speak with (clinical staff, provider,  specific staff member): DR ALBARADO NURSE/MA    What was the call regarding: PT STATES SHE HAD SOME FOLLOW UP QUESTIONS REGARDING SOME THINGS THAT DR HANSEN MENTIONED IN HIS LAST OV -     Is it okay if the provider responds through MyChart: CALL BACK PLEASE

## 2023-08-07 NOTE — TELEPHONE ENCOUNTER
Patient calling stating in her visit you mentioned that her stents are old and closing off, also about something leaking.  She would like to talk with you about this. Wondering if something can be done.

## 2023-10-16 RX ORDER — TICAGRELOR 60 MG/1
TABLET ORAL
Qty: 180 TABLET | Refills: 2 | Status: SHIPPED | OUTPATIENT
Start: 2023-10-16

## 2023-10-30 ENCOUNTER — TRANSCRIBE ORDERS (OUTPATIENT)
Dept: ADMINISTRATIVE | Facility: HOSPITAL | Age: 68
End: 2023-10-30
Payer: MEDICARE

## 2023-10-30 ENCOUNTER — LAB (OUTPATIENT)
Dept: LAB | Facility: HOSPITAL | Age: 68
End: 2023-10-30
Payer: MEDICARE

## 2023-10-30 DIAGNOSIS — N18.31 CHRONIC KIDNEY DISEASE (CKD) STAGE G3A/A1, MODERATELY DECREASED GLOMERULAR FILTRATION RATE (GFR) BETWEEN 45-59 ML/MIN/1.73 SQUARE METER AND ALBUMINURIA CREATININE RATIO LESS THAN 30 MG/G (CMS/H*: ICD-10-CM

## 2023-10-30 DIAGNOSIS — N18.31 CHRONIC KIDNEY DISEASE (CKD) STAGE G3A/A1, MODERATELY DECREASED GLOMERULAR FILTRATION RATE (GFR) BETWEEN 45-59 ML/MIN/1.73 SQUARE METER AND ALBUMINURIA CREATININE RATIO LESS THAN 30 MG/G (CMS/H*: Primary | ICD-10-CM

## 2023-10-30 DIAGNOSIS — I10 ESSENTIAL HYPERTENSION: ICD-10-CM

## 2023-10-30 LAB
ALBUMIN SERPL-MCNC: 4.4 G/DL (ref 3.5–5.2)
ANION GAP SERPL CALCULATED.3IONS-SCNC: 12.3 MMOL/L (ref 5–15)
BACTERIA UR QL AUTO: NORMAL /HPF
BASOPHILS # BLD AUTO: 0.02 10*3/MM3 (ref 0–0.2)
BASOPHILS NFR BLD AUTO: 0.3 % (ref 0–1.5)
BILIRUB UR QL STRIP: NEGATIVE
BUN SERPL-MCNC: 19 MG/DL (ref 8–23)
BUN/CREAT SERPL: 15.4 (ref 7–25)
CALCIUM SPEC-SCNC: 10.3 MG/DL (ref 8.6–10.5)
CHLORIDE SERPL-SCNC: 103 MMOL/L (ref 98–107)
CLARITY UR: CLEAR
CO2 SERPL-SCNC: 22.7 MMOL/L (ref 22–29)
COLOR UR: YELLOW
CREAT SERPL-MCNC: 1.23 MG/DL (ref 0.57–1)
CREAT UR-MCNC: 107.9 MG/DL
DEPRECATED RDW RBC AUTO: 45 FL (ref 37–54)
EGFRCR SERPLBLD CKD-EPI 2021: 47.7 ML/MIN/1.73
EOSINOPHIL # BLD AUTO: 0.37 10*3/MM3 (ref 0–0.4)
EOSINOPHIL NFR BLD AUTO: 5.4 % (ref 0.3–6.2)
ERYTHROCYTE [DISTWIDTH] IN BLOOD BY AUTOMATED COUNT: 13.4 % (ref 12.3–15.4)
GLUCOSE SERPL-MCNC: 150 MG/DL (ref 65–99)
GLUCOSE UR STRIP-MCNC: NEGATIVE MG/DL
HCT VFR BLD AUTO: 43.2 % (ref 34–46.6)
HGB BLD-MCNC: 14.2 G/DL (ref 12–15.9)
HGB UR QL STRIP.AUTO: ABNORMAL
IMM GRANULOCYTES # BLD AUTO: 0.03 10*3/MM3 (ref 0–0.05)
IMM GRANULOCYTES NFR BLD AUTO: 0.4 % (ref 0–0.5)
KETONES UR QL STRIP: NEGATIVE
LEUKOCYTE ESTERASE UR QL STRIP.AUTO: NEGATIVE
LYMPHOCYTES # BLD AUTO: 1.98 10*3/MM3 (ref 0.7–3.1)
LYMPHOCYTES NFR BLD AUTO: 28.7 % (ref 19.6–45.3)
MCH RBC QN AUTO: 29.8 PG (ref 26.6–33)
MCHC RBC AUTO-ENTMCNC: 32.9 G/DL (ref 31.5–35.7)
MCV RBC AUTO: 90.6 FL (ref 79–97)
MONOCYTES # BLD AUTO: 0.55 10*3/MM3 (ref 0.1–0.9)
MONOCYTES NFR BLD AUTO: 8 % (ref 5–12)
NEUTROPHILS NFR BLD AUTO: 3.94 10*3/MM3 (ref 1.7–7)
NEUTROPHILS NFR BLD AUTO: 57.2 % (ref 42.7–76)
NITRITE UR QL STRIP: NEGATIVE
PH UR STRIP.AUTO: 6 [PH] (ref 5–8)
PHOSPHATE SERPL-MCNC: 3.5 MG/DL (ref 2.5–4.5)
PLATELET # BLD AUTO: 280 10*3/MM3 (ref 140–450)
PMV BLD AUTO: 10.2 FL (ref 6–12)
POTASSIUM SERPL-SCNC: 4.1 MMOL/L (ref 3.5–5.2)
PROT ?TM UR-MCNC: 14.5 MG/DL
PROT UR QL STRIP: NEGATIVE
PROT/CREAT UR: 0.13 MG/G{CREAT}
PTH-INTACT SERPL-MCNC: 42.2 PG/ML (ref 15–65)
RBC # BLD AUTO: 4.77 10*6/MM3 (ref 3.77–5.28)
RBC # UR STRIP: NORMAL /HPF
REF LAB TEST METHOD: NORMAL
SODIUM SERPL-SCNC: 138 MMOL/L (ref 136–145)
SP GR UR STRIP: 1.01 (ref 1–1.03)
SQUAMOUS #/AREA URNS HPF: NORMAL /HPF
UROBILINOGEN UR QL STRIP: ABNORMAL
WBC # UR STRIP: NORMAL /HPF
WBC NRBC COR # BLD: 6.89 10*3/MM3 (ref 3.4–10.8)

## 2023-10-30 PROCEDURE — 85025 COMPLETE CBC W/AUTO DIFF WBC: CPT

## 2023-10-30 PROCEDURE — 82570 ASSAY OF URINE CREATININE: CPT

## 2023-10-30 PROCEDURE — 83970 ASSAY OF PARATHORMONE: CPT

## 2023-10-30 PROCEDURE — 81001 URINALYSIS AUTO W/SCOPE: CPT

## 2023-10-30 PROCEDURE — 80069 RENAL FUNCTION PANEL: CPT

## 2023-10-30 PROCEDURE — 84156 ASSAY OF PROTEIN URINE: CPT

## 2023-10-30 PROCEDURE — 36415 COLL VENOUS BLD VENIPUNCTURE: CPT

## 2023-10-30 RX ORDER — LOSARTAN POTASSIUM 100 MG/1
100 TABLET ORAL DAILY
Qty: 90 TABLET | Refills: 1 | Status: SHIPPED | OUTPATIENT
Start: 2023-10-30

## 2023-12-11 DIAGNOSIS — E78.49 OTHER HYPERLIPIDEMIA: ICD-10-CM

## 2023-12-11 RX ORDER — ROSUVASTATIN CALCIUM 20 MG/1
20 TABLET, COATED ORAL DAILY
Qty: 90 TABLET | Refills: 1 | Status: SHIPPED | OUTPATIENT
Start: 2023-12-11

## 2024-01-02 ENCOUNTER — LAB (OUTPATIENT)
Dept: LAB | Facility: HOSPITAL | Age: 69
End: 2024-01-02
Payer: MEDICARE

## 2024-01-02 ENCOUNTER — OFFICE VISIT (OUTPATIENT)
Dept: FAMILY MEDICINE CLINIC | Age: 69
End: 2024-01-02
Payer: MEDICARE

## 2024-01-02 ENCOUNTER — TELEPHONE (OUTPATIENT)
Dept: FAMILY MEDICINE CLINIC | Age: 69
End: 2024-01-02

## 2024-01-02 VITALS
WEIGHT: 198.8 LBS | HEART RATE: 87 BPM | SYSTOLIC BLOOD PRESSURE: 130 MMHG | TEMPERATURE: 97.8 F | HEIGHT: 64 IN | BODY MASS INDEX: 33.94 KG/M2 | DIASTOLIC BLOOD PRESSURE: 78 MMHG

## 2024-01-02 DIAGNOSIS — I10 ESSENTIAL HYPERTENSION: ICD-10-CM

## 2024-01-02 DIAGNOSIS — E11.8 TYPE 2 DIABETES MELLITUS WITH COMPLICATION, WITHOUT LONG-TERM CURRENT USE OF INSULIN: ICD-10-CM

## 2024-01-02 DIAGNOSIS — E78.49 OTHER HYPERLIPIDEMIA: ICD-10-CM

## 2024-01-02 DIAGNOSIS — Z12.31 SCREENING MAMMOGRAM FOR BREAST CANCER: ICD-10-CM

## 2024-01-02 DIAGNOSIS — Z12.11 COLON CANCER SCREENING: ICD-10-CM

## 2024-01-02 DIAGNOSIS — L65.9 HAIR THINNING: ICD-10-CM

## 2024-01-02 DIAGNOSIS — Z00.00 ROUTINE GENERAL MEDICAL EXAMINATION AT A HEALTH CARE FACILITY: Primary | ICD-10-CM

## 2024-01-02 LAB
ALBUMIN SERPL-MCNC: 4.8 G/DL (ref 3.5–5.2)
ALBUMIN/GLOB SERPL: 1.4 G/DL
ALP SERPL-CCNC: 72 U/L (ref 39–117)
ALT SERPL W P-5'-P-CCNC: 24 U/L (ref 1–33)
ANION GAP SERPL CALCULATED.3IONS-SCNC: 12.2 MMOL/L (ref 5–15)
AST SERPL-CCNC: 29 U/L (ref 1–32)
BASOPHILS # BLD AUTO: 0.04 10*3/MM3 (ref 0–0.2)
BASOPHILS NFR BLD AUTO: 0.5 % (ref 0–1.5)
BILIRUB SERPL-MCNC: 0.5 MG/DL (ref 0–1.2)
BUN SERPL-MCNC: 18 MG/DL (ref 8–23)
BUN/CREAT SERPL: 13 (ref 7–25)
CALCIUM SPEC-SCNC: 10.6 MG/DL (ref 8.6–10.5)
CHLORIDE SERPL-SCNC: 103 MMOL/L (ref 98–107)
CHOLEST SERPL-MCNC: 120 MG/DL (ref 0–200)
CO2 SERPL-SCNC: 23.8 MMOL/L (ref 22–29)
CREAT SERPL-MCNC: 1.38 MG/DL (ref 0.57–1)
DEPRECATED RDW RBC AUTO: 45.8 FL (ref 37–54)
EGFRCR SERPLBLD CKD-EPI 2021: 41.5 ML/MIN/1.73
EOSINOPHIL # BLD AUTO: 0.29 10*3/MM3 (ref 0–0.4)
EOSINOPHIL NFR BLD AUTO: 3.8 % (ref 0.3–6.2)
ERYTHROCYTE [DISTWIDTH] IN BLOOD BY AUTOMATED COUNT: 13.7 % (ref 12.3–15.4)
GLOBULIN UR ELPH-MCNC: 3.5 GM/DL
GLUCOSE SERPL-MCNC: 135 MG/DL (ref 65–99)
HBA1C MFR BLD: 6.6 % (ref 4.8–5.6)
HCT VFR BLD AUTO: 42.1 % (ref 34–46.6)
HDLC SERPL-MCNC: 39 MG/DL (ref 40–60)
HGB BLD-MCNC: 13.7 G/DL (ref 12–15.9)
IMM GRANULOCYTES # BLD AUTO: 0.01 10*3/MM3 (ref 0–0.05)
IMM GRANULOCYTES NFR BLD AUTO: 0.1 % (ref 0–0.5)
LDLC SERPL CALC-MCNC: 60 MG/DL (ref 0–100)
LDLC/HDLC SERPL: 1.48 {RATIO}
LYMPHOCYTES # BLD AUTO: 1.61 10*3/MM3 (ref 0.7–3.1)
LYMPHOCYTES NFR BLD AUTO: 21 % (ref 19.6–45.3)
MCH RBC QN AUTO: 29.4 PG (ref 26.6–33)
MCHC RBC AUTO-ENTMCNC: 32.5 G/DL (ref 31.5–35.7)
MCV RBC AUTO: 90.3 FL (ref 79–97)
MONOCYTES # BLD AUTO: 0.65 10*3/MM3 (ref 0.1–0.9)
MONOCYTES NFR BLD AUTO: 8.5 % (ref 5–12)
NEUTROPHILS NFR BLD AUTO: 5.07 10*3/MM3 (ref 1.7–7)
NEUTROPHILS NFR BLD AUTO: 66.1 % (ref 42.7–76)
PLATELET # BLD AUTO: 268 10*3/MM3 (ref 140–450)
PMV BLD AUTO: 10 FL (ref 6–12)
POTASSIUM SERPL-SCNC: 4.6 MMOL/L (ref 3.5–5.2)
PROT SERPL-MCNC: 8.3 G/DL (ref 6–8.5)
RBC # BLD AUTO: 4.66 10*6/MM3 (ref 3.77–5.28)
SODIUM SERPL-SCNC: 139 MMOL/L (ref 136–145)
TRIGL SERPL-MCNC: 116 MG/DL (ref 0–150)
TSH SERPL DL<=0.05 MIU/L-ACNC: 1.78 UIU/ML (ref 0.27–4.2)
VLDLC SERPL-MCNC: 21 MG/DL (ref 5–40)
WBC NRBC COR # BLD AUTO: 7.67 10*3/MM3 (ref 3.4–10.8)

## 2024-01-02 PROCEDURE — 84443 ASSAY THYROID STIM HORMONE: CPT | Performed by: NURSE PRACTITIONER

## 2024-01-02 PROCEDURE — 85025 COMPLETE CBC W/AUTO DIFF WBC: CPT

## 2024-01-02 PROCEDURE — 36415 COLL VENOUS BLD VENIPUNCTURE: CPT

## 2024-01-02 PROCEDURE — 80053 COMPREHEN METABOLIC PANEL: CPT

## 2024-01-02 PROCEDURE — 83036 HEMOGLOBIN GLYCOSYLATED A1C: CPT

## 2024-01-02 PROCEDURE — 80061 LIPID PANEL: CPT

## 2024-01-02 RX ORDER — ACETAMINOPHEN 325 MG/1
325 TABLET ORAL EVERY 6 HOURS PRN
COMMUNITY

## 2024-01-02 NOTE — ASSESSMENT & PLAN NOTE
To work on diet, regular exercise, continue to monitor sugars, check feet daily, see optometrist yearly

## 2024-01-02 NOTE — PROGRESS NOTES
The ABCs of the Annual Wellness Visit  Subsequent Medicare Wellness Visit    Subjective    Jesse Espinal is a 68 y.o. female who presents for a Subsequent Medicare Wellness Visit.    Medicare wellness HPI  Exercises regularly:1 day a week  Eats healthy:tries   Last mammogram:12- benign  Last DEXA:12-21-22 normal   Last pap smear:history of hysterecomy   BSE:yes   Wears seatbelts:yes   Living will:has a form, will get her son to be her health care surrogate, will complete and bring back   Optometrist:lyric   Dentist:CLEMENTINE Wall   Tobacco:none, quit 2009  Alcohol intake:weekly 1-2 drinks  Drugs:none   Falls:none   Colonoscopy: 2013  Immunizations:pnem 2 vaccines, zostavac     The following portions of the patient's history were reviewed and   updated as appropriate: allergies, current medications, past family history, past medical history, past social history, past surgical history, and problem list.    Compared to one year ago, the patient feels her physical   health is the same.    Compared to one year ago, the patient feels her mental   health is the same.    Recent Hospitalizations:  She was not admitted to the hospital during the last year.       Current Medical Providers:  Patient Care Team:  Quyen Farah APRN as PCP - General (Family Medicine)    Outpatient Medications Prior to Visit   Medication Sig Dispense Refill    acetaminophen (TYLENOL) 325 MG tablet Take 1 tablet by mouth Every 6 (Six) Hours As Needed.      amLODIPine (NORVASC) 5 MG tablet Take 1 tablet by mouth Daily. (Patient taking differently: Take 2 tablets by mouth Daily.) 90 tablet 1    Blood Glucose Monitoring Suppl device 1 each 2 (Two) Times a Day. Pt to use to check blood sugar twice daily for E11.9 1 each 0    Brilinta 60 MG tablet tablet TAKE 1 TABLET BY MOUTH EVERY 12 HOURS 180 tablet 2    docusate sodium 100 MG capsule Take 1 capsule by mouth.      glucose blood (OneTouch Ultra) test strip 1 each by Other  route See Admin Instructions. Use once to twice daily as needed 100 each 3    glucose blood test strip Use to check blood sugar twice daily for E11.9 200 each 12    hydroCHLOROthiazide (HYDRODIURIL) 12.5 MG tablet Take 1 tablet by mouth Every Night.      isosorbide mononitrate (IMDUR) 30 MG 24 hr tablet Take 1 tablet by mouth Daily. 90 tablet 1    Lancets misc 1 each 2 (Two) Times a Day. Use to check blood sugar twice daily for E11.9 200 each 3    losartan (COZAAR) 100 MG tablet TAKE 1 TABLET BY MOUTH DAILY 90 tablet 1    metFORMIN ER (GLUCOPHAGE-XR) 500 MG 24 hr tablet TAKE 1 TABLET BY MOUTH DAILY WITH BREAKFAST 90 tablet 1    metoprolol succinate XL (TOPROL-XL) 100 MG 24 hr tablet Take 1 tablet by mouth Daily. 90 tablet 1    rosuvastatin (CRESTOR) 20 MG tablet TAKE 1 TABLET BY MOUTH DAILY 90 tablet 1     No facility-administered medications prior to visit.       No opioid medication identified on active medication list. I have reviewed chart for other potential  high risk medication/s and harmful drug interactions in the elderly.        Aspirin is not on active medication list.  Aspirin use is not indicated based on review of current medical condition/s. Risk of harm outweighs potential benefits.  .    Patient Active Problem List   Diagnosis    Coronary artery disease involving native coronary artery of native heart without angina pectoris    History of coronary artery stent placement    Other hyperlipidemia    Type 2 diabetes mellitus with complication, without long-term current use of insulin    Class 2 severe obesity due to excess calories with serious comorbidity and body mass index (BMI) of 36.0 to 36.9 in adult    Heart murmur    Stage 3a chronic kidney disease (CKD)    Swelling of both lower extremities    Immunization due    Essential hypertension    History of COVID-19    Routine general medical examination at a health care facility    Postmenopausal    Screening mammogram for breast cancer    Colon  "cancer screening    Hair thinning     Advance Care Planning   Advance Care Planning     Advance Directive is not on file.  ACP discussion was held with the patient during this visit. Patient does not have an advance directive, information provided.     Objective    Vitals:    24 0929   BP: 130/78   BP Location: Left arm   Patient Position: Sitting   Cuff Size: Large Adult   Pulse: 87   Temp: 97.8 °F (36.6 °C)   TempSrc: Oral   Weight: 90.2 kg (198 lb 12.8 oz)   Height: 162.6 cm (64.02\")     Estimated body mass index is 34.1 kg/m² as calculated from the following:    Height as of this encounter: 162.6 cm (64.02\").    Weight as of this encounter: 90.2 kg (198 lb 12.8 oz).    BMI is >= 30 and <35. (Class 1 Obesity). The following options were offered after discussion;: exercise counseling/recommendations and nutrition counseling/recommendations      Does the patient have evidence of cognitive impairment? No          HEALTH RISK ASSESSMENT    Smoking Status:  Social History     Tobacco Use   Smoking Status Former    Packs/day: 2.50    Years: 40.00    Additional pack years: 0.00    Total pack years: 100.00    Types: Cigarettes    Quit date:     Years since quitting: 15.0   Smokeless Tobacco Never   Tobacco Comments    CAFFEINE USE: 4- 8OZ GLASSES DAILY/ 2 CUPS COFFEE WKLY     Alcohol Consumption:  Social History     Substance and Sexual Activity   Alcohol Use Yes    Alcohol/week: 1.0 standard drink of alcohol    Types: 1 Glasses of wine per week    Comment: occ     Fall Risk Screen:    STEADI Fall Risk Assessment was completed, and patient is at LOW risk for falls.Assessment completed on:2024    Depression Screenin/2/2024     9:31 AM   PHQ-2/PHQ-9 Depression Screening   Little Interest or Pleasure in Doing Things 0-->not at all   Feeling Down, Depressed or Hopeless 0-->not at all   PHQ-9: Brief Depression Severity Measure Score 0       Health Habits and Functional and Cognitive Screening:      " 1/2/2024     9:33 AM   Functional & Cognitive Status   Do you have difficulty preparing food and eating? No   Do you have difficulty bathing yourself, getting dressed or grooming yourself? No   Do you have difficulty using the toilet? No   Do you have difficulty moving around from place to place? No   Do you have trouble with steps or getting out of a bed or a chair? No   Current Diet Unhealthy Diet   Dental Exam Up to date   Eye Exam Not up to date   Exercise (times per week) 0 times per week   Current Exercises Include No Regular Exercise   Do you need help using the phone?  No   Are you deaf or do you have serious difficulty hearing?  Yes   Do you need help to go to places out of walking distance? No   Do you need help shopping? No   Do you need help preparing meals?  No   Do you need help with housework?  No   Do you need help with laundry? No   Do you need help taking your medications? No   Do you need help managing money? No   Do you ever drive or ride in a car without wearing a seat belt? No   Have you felt unusual stress, anger or loneliness in the last month? Yes   Who do you live with? Alone   If you need help, do you have trouble finding someone available to you? No   Have you been bothered in the last four weeks by sexual problems? No   Do you have difficulty concentrating, remembering or making decisions? Yes       Age-appropriate Screening Schedule:  Refer to the list below for future screening recommendations based on patient's age, sex and/or medical conditions. Orders for these recommended tests are listed in the plan section. The patient has been provided with a written plan.    Health Maintenance   Topic Date Due    TDAP/TD VACCINES (1 - Tdap) Never done    ZOSTER VACCINE (2 of 3) 11/15/2008    COLORECTAL CANCER SCREENING  04/22/2023    DIABETIC EYE EXAM  05/04/2023    INFLUENZA VACCINE  08/01/2023    COVID-19 Vaccine (4 - 2023-24 season) 09/01/2023    DIABETIC FOOT EXAM  12/14/2023    HEMOGLOBIN  A1C  12/14/2023    BMI FOLLOWUP  12/14/2023    LIPID PANEL  06/14/2024    URINE MICROALBUMIN  10/30/2024    MAMMOGRAM  12/21/2024    DXA SCAN  12/21/2024    ANNUAL WELLNESS VISIT  01/02/2025    HEPATITIS C SCREENING  Completed    Pneumococcal Vaccine 65+  Completed    PAP SMEAR  Discontinued                  CMS Preventative Services Quick Reference  Risk Factors Identified During Encounter  Immunizations Discussed/Encouraged: Tdap, Shingrix, COVID19, and RSV (Respiratory Syncytial Virus)  The above risks/problems have been discussed with the patient.  Pertinent information has been shared with the patient in the After Visit Summary.  An After Visit Summary and PPPS were made available to the patient.    Follow Up:   Next Medicare Wellness visit to be scheduled in 1 year.       Additional E&M Note during same encounter follows:  Patient has multiple medical problems which are significant and separately identifiable that require additional work above and beyond the Medicare Wellness Visit.      Chief Complaint  Medicare Wellness-subsequent    Subjective        Diabetes:  Current medication: metformin, no refills needed   Tolerating medication: Yes  Last eye exam: needs an appt   Last foot exam:   At home BS ranges: 140-145   Lab Results       Component                Value               Date                       HGBA1C                   7.10 (H)            06/14/2023                Hyperlipidemia  Current medication: crestor   Tolerating medication: Yes  Needs Refill: no    Lab Results       Component                Value               Date                       CHOL                     129                 06/14/2023                 CHLPL                    131                 01/26/2021                 TRIG                     146                 06/14/2023                 HDL                      35 (L)              06/14/2023                 LDL                      68                  06/14/2023                 Hypertension:  Current medication: norvasc, HCTZ, Toprol XL and losartan   Tolerating Medication: Yes  Checking BP at home and it is: runs normal usually   Needs refills: no  Labs:  Lab Results       Component                Value               Date                       GLUCOSE                  150 (H)             10/30/2023                 BUN                      19                  10/30/2023                 CREATININE               1.23 (H)            10/30/2023                 EGFRIFNONA               46 (L)              2021                 BCR                      15.4                10/30/2023                 K                        4.1                 10/30/2023                 CO2                      22.7                10/30/2023                 CALCIUM                  10.3                10/30/2023                 PROTENTOTREF             7.6                 2022                 ALBUMIN                  4.4                 10/30/2023                 LABIL2                   1.0                 2022                 AST                      25                  2023                 ALT                      27                  2023              PAST MEDICAL HISTORY changes since 2023:      COVID + , mild ( was in hospital)         GYNECOLOGICAL HISTORY:             CURRENT MEDICAL PROVIDERS:    Cardiologist: Dr Dailey         PREVENTIVE HEALTH MAINTENANCE             Hepatitis C Medicare Screening: was last done ; negative         Surgical History:         Appendectomy    Cholecystectomy    Hysterectomy     Coronary Artery Stent Placement: 13 and 14 LAD; Procedures: colonoscopy 13, diverticulosis and internal hemorrhoids heart cath , severe 3 vessel CAD     Procedures: cystoscopy 16         Family History:     Father:  at age 45; Cause of death was stroke;  liver disease     Mother:  at age 75; Cause of death  "was colon;  Coronary Artery Disease; Hypertension;  Breast Cancer; Colon Cancer;  Type 2 Diabetes     Brother(s): 4 brother(s) total; 2 ;  Coronary Artery Disease;  Lung Cancer     Sister(s): 3 sister(s) total; 2 ;  Coronary Artery Disease; Hyperlipidemia; Neurological/Genetic Cerebrovascular Accident (  70 );  pneumonia     Son(s): 3 son(s) total;  Hypertension; Hyperlipidemia         Social History:     Occupation:retired  [employer]       Marital Status:  2023     Children: 3 children                Jesse Espinal is also being seen today for wellness and follow up     Review of Systems   Constitutional:  Negative for fatigue and fever.   HENT:  Positive for dental problem (tooth pulled in ) and ear pain (left, intermittently since ). Negative for sore throat.    Eyes:  Negative for visual disturbance.   Respiratory:  Negative for cough and shortness of breath.    Cardiovascular:  Negative for chest pain, palpitations and leg swelling.   Gastrointestinal:  Positive for abdominal pain (with gas) and constipation (chronic).   Endocrine:        Hair thinning    Genitourinary:  Negative for difficulty urinating.   Musculoskeletal:  Negative for arthralgias.   Skin:  Negative for rash.   Hematological:  Negative for adenopathy.   Psychiatric/Behavioral:  Negative for sleep disturbance.        Objective   Vital Signs:  /78 (BP Location: Left arm, Patient Position: Sitting, Cuff Size: Large Adult)   Pulse 87   Temp 97.8 °F (36.6 °C) (Oral)   Ht 162.6 cm (64.02\")   Wt 90.2 kg (198 lb 12.8 oz)   BMI 34.10 kg/m²     Physical Exam  Vitals reviewed.   Constitutional:       General: She is not in acute distress.     Appearance: Normal appearance. She is well-developed.   HENT:      Head: Normocephalic. Hair is normal.      Right Ear: Hearing, tympanic membrane, ear canal and external ear normal. No decreased hearing noted. No drainage.      Left Ear: Hearing, " tympanic membrane, ear canal and external ear normal. No decreased hearing noted.      Nose: Nose normal. No nasal deformity.      Mouth/Throat:      Mouth: Mucous membranes are moist.   Eyes:      General: Lids are normal.      Extraocular Movements: Extraocular movements intact.      Conjunctiva/sclera: Conjunctivae normal.      Pupils: Pupils are equal, round, and reactive to light.   Neck:      Thyroid: No thyromegaly.      Vascular: No carotid bruit or JVD.   Cardiovascular:      Rate and Rhythm: Normal rate and regular rhythm.      Pulses: Normal pulses.           Posterior tibial pulses are 2+ on the right side and 2+ on the left side.      Heart sounds: Normal heart sounds. No murmur heard.     No friction rub. No gallop.   Pulmonary:      Effort: Pulmonary effort is normal. No respiratory distress.      Breath sounds: Normal breath sounds. No wheezing.   Chest:   Breasts:     Right: Normal. No mass, nipple discharge or skin change.      Left: Normal. No mass, nipple discharge or skin change.   Abdominal:      General: Bowel sounds are normal.      Palpations: Abdomen is soft. There is no mass.      Tenderness: There is no abdominal tenderness.   Musculoskeletal:         General: No swelling, tenderness or deformity. Normal range of motion.      Cervical back: Normal range of motion and neck supple.   Feet:      Right foot:      Protective Sensation: 3 sites tested.  3 sites sensed.      Skin integrity: Skin integrity normal. No ulcer or blister.      Toenail Condition: Right toenails are normal.      Left foot:      Protective Sensation: 3 sites tested.  3 sites sensed.      Skin integrity: Skin integrity normal. No ulcer or blister.      Toenail Condition: Left toenails are normal.      Comments: Diabetic Foot Exam Performed and Monofilament Test Performed     Lymphadenopathy:      Cervical: No cervical adenopathy.      Upper Body:      Right upper body: No axillary adenopathy.      Left upper body: No  axillary adenopathy.   Skin:     General: Skin is warm and dry.      Findings: No erythema or rash.   Neurological:      Mental Status: She is alert and oriented to person, place, and time.      Motor: No abnormal muscle tone.      Gait: Gait normal.      Deep Tendon Reflexes: Reflexes are normal and symmetric.   Psychiatric:         Mood and Affect: Mood normal.         Behavior: Behavior normal.         Thought Content: Thought content normal.         Judgment: Judgment normal.                         Assessment and Plan   Diagnoses and all orders for this visit:    1. Routine general medical examination at a health care facility (Primary)  Assessment & Plan:  Advise regular exercise, healthy eating, always wear seat belts. Living will discussed, complete form and bring in a copy, fall prevention discussed.  Immunizations discussed. Advised to check with her pharmacy on Tdap, shingrex, RSV and covid vaccines, [facility]  To set up her yearly optometry exam and continue dental exams.          2. Type 2 diabetes mellitus with complication, without long-term current use of insulin  Assessment & Plan:  To work on diet, regular exercise, continue to monitor sugars, check feet daily, see optometrist yearly     Orders:  -     Comprehensive Metabolic Panel; Future  -     Lipid Panel; Future  -     Hemoglobin A1c; Future    3. Essential hypertension  Assessment & Plan:  Hypertension is stable. Continue to monitor BP at home. Continue current meds. Continue to modify diet and lifestyle.       4. Other hyperlipidemia  Assessment & Plan:  Continue current medication and efforts with diet and exercise.       Orders:  -     Lipid Panel; Future    5. Screening mammogram for breast cancer  Assessment & Plan:  To set up her mammogram, continue monthly BSE     Orders:  -     Mammo Screening Digital Tomosynthesis Bilateral With CAD; Future    6. Colon cancer screening  Assessment & Plan:  Send for  colon screening     Orders:  -     Ambulatory Referral to Gastroenterology    7. Hair thinning  Assessment & Plan:  Checking some labs     Orders:  -     CBC w AUTO Differential; Future  -     TSH Rfx On Abnormal To Free T4             Follow Up   Return for followup pending lab results.  Patient was given instructions and counseling regarding her condition or for health maintenance advice. Please see specific information pulled into the AVS if appropriate.

## 2024-01-02 NOTE — TELEPHONE ENCOUNTER
----- Message from SPIKE Jj sent at 1/2/2024 10:01 AM EST -----  Flu vaccine at [place], get record

## 2024-01-02 NOTE — ASSESSMENT & PLAN NOTE
Advise regular exercise, healthy eating, always wear seat belts. Living will discussed, complete form and bring in a copy, fall prevention discussed.  Immunizations discussed. Advised to check with her pharmacy on Tdap, shingrex, RSV and covid vaccines, send for her flu vaccine from [facility]  To set up her yearly optometry exam and continue dental exams.

## 2024-01-17 ENCOUNTER — TELEPHONE (OUTPATIENT)
Dept: GASTROENTEROLOGY | Facility: CLINIC | Age: 69
End: 2024-01-17
Payer: MEDICARE

## 2024-01-17 ENCOUNTER — PREP FOR SURGERY (OUTPATIENT)
Dept: OTHER | Facility: HOSPITAL | Age: 69
End: 2024-01-17
Payer: MEDICARE

## 2024-01-17 DIAGNOSIS — Z80.0 FAMILY HISTORY OF MALIGNANT NEOPLASM OF COLON: Primary | ICD-10-CM

## 2024-01-17 NOTE — TELEPHONE ENCOUNTER
NO PERSONAL HX OF POLYPS    NO FAMILY HX OF POLYPS     FAMILY HX OF COLON CA            LIST OF  MEDICATIONS    METFORMIN   LOSARTAN AMLODIPINE  ISOSORBIDE MONONITRATE  BRILLINTA  HYDROCHLOROTHIAZIDE  ROSUVASTATIN  METOPROL BIOTIN  STOOL SOFTENER          OA QUESTIONNAIRE SCANNED IN MEDIA

## 2024-01-23 ENCOUNTER — HOSPITAL ENCOUNTER (OUTPATIENT)
Dept: MAMMOGRAPHY | Facility: HOSPITAL | Age: 69
Discharge: HOME | End: 2024-01-23
Admitting: NURSE PRACTITIONER
Payer: MEDICARE

## 2024-01-23 DIAGNOSIS — Z12.31 SCREENING MAMMOGRAM FOR BREAST CANCER: ICD-10-CM

## 2024-01-23 PROCEDURE — 77067 SCR MAMMO BI INCL CAD: CPT

## 2024-01-23 PROCEDURE — 77063 BREAST TOMOSYNTHESIS BI: CPT

## 2024-01-24 ENCOUNTER — TELEPHONE (OUTPATIENT)
Dept: GASTROENTEROLOGY | Facility: CLINIC | Age: 69
End: 2024-01-24
Payer: MEDICARE

## 2024-01-30 ENCOUNTER — HOSPITAL ENCOUNTER (OUTPATIENT)
Facility: HOSPITAL | Age: 69
Setting detail: HOSPITAL OUTPATIENT SURGERY
Discharge: HOME | End: 2024-01-30
Attending: INTERNAL MEDICINE | Admitting: INTERNAL MEDICINE
Payer: MEDICARE

## 2024-01-30 ENCOUNTER — ANESTHESIA EVENT (OUTPATIENT)
Dept: GASTROENTEROLOGY | Facility: HOSPITAL | Age: 69
End: 2024-01-30
Payer: MEDICARE

## 2024-01-30 ENCOUNTER — ANESTHESIA (OUTPATIENT)
Dept: GASTROENTEROLOGY | Facility: HOSPITAL | Age: 69
End: 2024-01-30
Payer: MEDICARE

## 2024-01-30 VITALS
HEART RATE: 63 BPM | OXYGEN SATURATION: 94 % | WEIGHT: 194 LBS | SYSTOLIC BLOOD PRESSURE: 147 MMHG | RESPIRATION RATE: 16 BRPM | DIASTOLIC BLOOD PRESSURE: 74 MMHG | HEIGHT: 64 IN | BODY MASS INDEX: 33.12 KG/M2

## 2024-01-30 DIAGNOSIS — Z80.0 FAMILY HISTORY OF MALIGNANT NEOPLASM OF COLON: ICD-10-CM

## 2024-01-30 LAB — GLUCOSE BLDC GLUCOMTR-MCNC: 110 MG/DL (ref 70–130)

## 2024-01-30 PROCEDURE — 25010000002 GLYCOPYRROLATE 0.2 MG/ML SOLUTION

## 2024-01-30 PROCEDURE — 88305 TISSUE EXAM BY PATHOLOGIST: CPT | Performed by: INTERNAL MEDICINE

## 2024-01-30 PROCEDURE — 25810000003 LACTATED RINGERS PER 1000 ML: Performed by: INTERNAL MEDICINE

## 2024-01-30 PROCEDURE — 25010000002 PROPOFOL 10 MG/ML EMULSION

## 2024-01-30 PROCEDURE — 82948 REAGENT STRIP/BLOOD GLUCOSE: CPT

## 2024-01-30 PROCEDURE — S0260 H&P FOR SURGERY: HCPCS | Performed by: INTERNAL MEDICINE

## 2024-01-30 PROCEDURE — 88300 SURGICAL PATH GROSS: CPT | Performed by: INTERNAL MEDICINE

## 2024-01-30 DEVICE — DEV CLIP ENDO RESOLUTION360 CONTRL ROT 235CM: Type: IMPLANTABLE DEVICE | Site: CECUM | Status: FUNCTIONAL

## 2024-01-30 RX ORDER — PROPOFOL 10 MG/ML
VIAL (ML) INTRAVENOUS AS NEEDED
Status: DISCONTINUED | OUTPATIENT
Start: 2024-01-30 | End: 2024-01-30 | Stop reason: SURG

## 2024-01-30 RX ORDER — GLYCOPYRROLATE 0.2 MG/ML
INJECTION INTRAMUSCULAR; INTRAVENOUS AS NEEDED
Status: DISCONTINUED | OUTPATIENT
Start: 2024-01-30 | End: 2024-01-30 | Stop reason: SURG

## 2024-01-30 RX ORDER — SODIUM CHLORIDE, SODIUM LACTATE, POTASSIUM CHLORIDE, CALCIUM CHLORIDE 600; 310; 30; 20 MG/100ML; MG/100ML; MG/100ML; MG/100ML
1000 INJECTION, SOLUTION INTRAVENOUS CONTINUOUS
Status: DISCONTINUED | OUTPATIENT
Start: 2024-01-30 | End: 2024-01-30 | Stop reason: HOSPADM

## 2024-01-30 RX ORDER — LIDOCAINE HYDROCHLORIDE 20 MG/ML
INJECTION, SOLUTION INFILTRATION; PERINEURAL AS NEEDED
Status: DISCONTINUED | OUTPATIENT
Start: 2024-01-30 | End: 2024-01-30 | Stop reason: SURG

## 2024-01-30 RX ADMIN — LIDOCAINE HYDROCHLORIDE 60 MG: 20 INJECTION, SOLUTION INFILTRATION; PERINEURAL at 11:09

## 2024-01-30 RX ADMIN — PROPOFOL 100 MG: 10 INJECTION, EMULSION INTRAVENOUS at 11:09

## 2024-01-30 RX ADMIN — PROPOFOL 160 MCG/KG/MIN: 10 INJECTION, EMULSION INTRAVENOUS at 11:09

## 2024-01-30 RX ADMIN — SODIUM CHLORIDE, POTASSIUM CHLORIDE, SODIUM LACTATE AND CALCIUM CHLORIDE 1000 ML: 600; 310; 30; 20 INJECTION, SOLUTION INTRAVENOUS at 11:02

## 2024-01-30 RX ADMIN — GLYCOPYRROLATE 0.1 MG: 0.2 INJECTION INTRAMUSCULAR; INTRAVENOUS at 11:09

## 2024-01-30 NOTE — H&P
[facility]  Pre Procedure History & Physical    Chief Complaint:   Screening-FH CRC    Subjective     HPI:   67 yo here today for colonoscopy.  Pt reports + FH CRC.  Patient denies GI symptoms currrently.  Last exam 2013    Past Medical History:   Past Medical History:   Diagnosis Date    Acid reflux disease     Angina pectoris     Anxiety     CAD (coronary artery disease)     Depression     Diabetes mellitus, type II     Dysuria     Family history of colon cancer     Fatigue     Foot pain     GERD (gastroesophageal reflux disease)     Hematuria     Hypercholesterolemia     Hypertension     MORRIS (obstructive sleep apnea)     CPAP       Past Surgical History:  Past Surgical History:   Procedure Laterality Date    APPENDECTOMY      CARDIAC CATHETERIZATION  07/2013    SEVERE 3 VESSEL CAD    CHOLECYSTECTOMY  01/22/2013    DIVERTICULOSIS AND INTERNAL HEMORRHOIDS    COLONOSCOPY      COLONOSCOPY  01/30/2024    CORONARY ANGIOPLASTY WITH STENT PLACEMENT  x2    CORONARY STENT PLACEMENT  07/30/2013    LAD x4    CYSTOSCOPY  08/04/2016    HYSTERECTOMY      OOPHORECTOMY         Family History:  Family History   Problem Relation Age of Onset    Colon cancer Mother     Coronary artery disease Mother     Hypertension Mother     Breast cancer Mother     Diabetes Mother     Heart disease Mother     Stroke Father     Liver disease Father     Coronary artery disease Sister     Hyperlipidemia Sister     Coronary artery disease Brother     Lung cancer Brother     Hypertension Son     No Known Problems Maternal Grandmother     No Known Problems Maternal Grandfather     No Known Problems Paternal Grandmother     No Known Problems Paternal Grandfather     Malig Hyperthermia Neg Hx        Social History:   reports that she quit smoking about 15 years ago. Her smoking use included cigarettes. She has a 100.00 pack-year smoking history. She has never used smokeless tobacco. She reports current alcohol use of about 1.0  standard drink of alcohol per week. She reports that she does not use drugs.    Medications:   Medications Prior to Admission   Medication Sig Dispense Refill Last Dose    acetaminophen (TYLENOL) 325 MG tablet Take 1 tablet by mouth Every 6 (Six) Hours As Needed.       amLODIPine (NORVASC) 5 MG tablet Take 1 tablet by mouth Daily. (Patient taking differently: Take 2 tablets by mouth Daily.) 90 tablet 1 1/30/2024    Blood Glucose Monitoring Suppl device 1 each 2 (Two) Times a Day. Pt to use to check blood sugar twice daily for E11.9 1 each 0 1/29/2024    Brilinta 60 MG tablet tablet TAKE 1 TABLET BY MOUTH EVERY 12 HOURS 180 tablet 2 1/24/2024    docusate sodium 100 MG capsule Take 1 capsule by mouth.   1/29/2024    glucose blood (OneTouch Ultra) test strip 1 each by Other route See Admin Instructions. Use once to twice daily as needed 100 each 3 1/29/2024    glucose blood test strip Use to check blood sugar twice daily for E11.9 200 each 12 1/29/2024    hydroCHLOROthiazide (HYDRODIURIL) 12.5 MG tablet Take 1 tablet by mouth Every Night.   1/29/2024    isosorbide mononitrate (IMDUR) 30 MG 24 hr tablet Take 1 tablet by mouth Daily. 90 tablet 1 1/30/2024    Lancets misc 1 each 2 (Two) Times a Day. Use to check blood sugar twice daily for E11.9 200 each 3 1/29/2024    losartan (COZAAR) 100 MG tablet TAKE 1 TABLET BY MOUTH DAILY 90 tablet 1 1/29/2024    metFORMIN ER (GLUCOPHAGE-XR) 500 MG 24 hr tablet TAKE 1 TABLET BY MOUTH DAILY WITH BREAKFAST 90 tablet 1 1/29/2024    metoprolol succinate XL (TOPROL-XL) 100 MG 24 hr tablet Take 1 tablet by mouth Daily. (Patient taking differently: Take 1 tablet by mouth Every Night.) 90 tablet 1 1/29/2024    rosuvastatin (CRESTOR) 20 MG tablet TAKE 1 TABLET BY MOUTH DAILY 90 tablet 1 1/29/2024       Allergies:  Shellfish-derived products, Sulfa antibiotics, Contrast dye (echo or unknown ct/mr), and Effient [prasugrel]    ROS:    Pertinent items are noted in HPI     Objective     Blood  "pressure 153/81, pulse 76, resp. rate 16, height 162.6 cm (64\"), weight 88 kg (194 lb), SpO2 93%.    Physical Exam   Constitutional: Pt is oriented to person, place, and time and well-developed, well-nourished, and in no distress.   Mouth/Throat: Oropharynx is clear and moist.   Neck: Normal range of motion.   Cardiovascular: Normal rate, regular rhythm    Pulmonary/Chest: Effort normal    Abdominal: Soft. Nontender  Skin: Skin is warm and dry.   Psychiatric: Mood, memory, affect and judgment normal.     Assessment & Plan     Diagnosis:  Screening for colon cancer-FH CRC    Anticipated Surgical Procedure:  colonoscopy    The risks, benefits, and alternatives of this procedure have been discussed with the patient or the responsible party- the patient understands and agrees to proceed.                                                              "

## 2024-01-30 NOTE — ANESTHESIA PREPROCEDURE EVALUATION
Anesthesia Evaluation     Patient summary reviewed and Nursing notes reviewed                Airway   Mallampati: II  TM distance: >3 FB  Neck ROM: full  Dental      Pulmonary - negative pulmonary ROS   (+) a smoker Former, COPD,sleep apnea on CPAP  Cardiovascular - negative cardio ROS    ECG reviewed  PT is on anticoagulation therapy  Patient on routine beta blocker  Rhythm: regular  Rate: normal    (+) hypertension, valvular problems/murmurs, CAD, cardiac stents , angina, hyperlipidemia      Neuro/Psych- negative ROS  (+) psychiatric history Anxiety and Depression  GI/Hepatic/Renal/Endo - negative ROS   (+) morbid obesity, GERD, renal disease- CRI, diabetes mellitus type 2    Musculoskeletal (-) negative ROS    Abdominal    Substance History - negative use  (+) alcohol use     OB/GYN negative ob/gyn ROS         Other                      Anesthesia Plan    ASA 3     MAC     intravenous induction     Anesthetic plan, risks, benefits, and alternatives have been provided, discussed and informed consent has been obtained with: patient.    Plan discussed with CRNA.    CODE STATUS:

## 2024-01-30 NOTE — ANESTHESIA POSTPROCEDURE EVALUATION
Patient: Jesse Espinal    Procedure Summary       Date: 01/30/24 Room / Location: [facility] ENDOSCOPY 4 /  [facility]    Anesthesia Start: 1106 Anesthesia Stop: 1140    Procedure: COLONOSCOPY to cecum ti with hot/cold snare biopsies/polypectomies with clip x2 Diagnosis:       Family history of malignant neoplasm of colon      (Family history of malignant neoplasm of colon [Z80.0])    Surgeons: Clair Reed MD Provider: Jermain Stanton MD    Anesthesia Type: MAC ASA Status: 3            Anesthesia Type: MAC    Vitals  Vitals Value Taken Time   /74 01/30/24 1202   Temp     Pulse 63 01/30/24 1202   Resp 16 01/30/24 1202   SpO2 94 % 01/30/24 1202           Post Anesthesia Care and Evaluation    Patient location during evaluation: PACU  Patient participation: complete - patient participated  Level of consciousness: awake and alert  Pain management: adequate    Airway patency: patent  Anesthetic complications: No anesthetic complications    Cardiovascular status: acceptable  Respiratory status: acceptable  Hydration status: acceptable    Comments: --------------------            01/30/24               1202     --------------------   BP:       147/74     Pulse:      63       Resp:       16       SpO2:      94%      --------------------

## 2024-01-30 NOTE — DISCHARGE INSTRUCTIONS

## 2024-01-31 LAB
LAB AP CASE REPORT: NORMAL
PATH REPORT.FINAL DX SPEC: NORMAL
PATH REPORT.GROSS SPEC: NORMAL

## 2024-02-01 DIAGNOSIS — I10 ESSENTIAL HYPERTENSION: ICD-10-CM

## 2024-02-01 RX ORDER — METOPROLOL SUCCINATE 100 MG/1
100 TABLET, EXTENDED RELEASE ORAL DAILY
Qty: 90 TABLET | Refills: 1 | Status: SHIPPED | OUTPATIENT
Start: 2024-02-01

## 2024-02-05 ENCOUNTER — TELEPHONE (OUTPATIENT)
Dept: GASTROENTEROLOGY | Facility: CLINIC | Age: 69
End: 2024-02-05
Payer: MEDICARE

## 2024-02-05 NOTE — TELEPHONE ENCOUNTER
----- Message from Clair Reed MD sent at 2/3/2024  8:47 AM EST -----  The polyp(s) biopsies showed adenomatous change. This is not cancerous but is considered potentially precancerous. Follow-up colonoscopy in 3 years is advised.

## 2024-02-05 NOTE — TELEPHONE ENCOUNTER
Pt reviewed results and Dr Newman' note and recommendations via [facility].     Colonoscopy placed in  and recall for 1/30/27.

## 2024-02-20 DIAGNOSIS — E11.8 TYPE 2 DIABETES MELLITUS WITH COMPLICATION, WITHOUT LONG-TERM CURRENT USE OF INSULIN: ICD-10-CM

## 2024-02-20 RX ORDER — METFORMIN HYDROCHLORIDE 500 MG/1
500 TABLET, EXTENDED RELEASE ORAL
Qty: 90 TABLET | Refills: 1 | Status: SHIPPED | OUTPATIENT
Start: 2024-02-20

## 2024-02-23 RX ORDER — ISOSORBIDE MONONITRATE 30 MG/1
30 TABLET, EXTENDED RELEASE ORAL DAILY
Qty: 90 TABLET | Refills: 0 | Status: SHIPPED | OUTPATIENT
Start: 2024-02-23

## 2024-05-01 ENCOUNTER — LAB (OUTPATIENT)
Dept: LAB | Facility: HOSPITAL | Age: 69
End: 2024-05-01
Payer: MEDICARE

## 2024-05-01 ENCOUNTER — TRANSCRIBE ORDERS (OUTPATIENT)
Dept: LAB | Facility: HOSPITAL | Age: 69
End: 2024-05-01
Payer: MEDICARE

## 2024-05-01 DIAGNOSIS — N18.31 CHRONIC KIDNEY DISEASE (CKD) STAGE G3A/A1, MODERATELY DECREASED GLOMERULAR FILTRATION RATE (GFR) BETWEEN 45-59 ML/MIN/1.73 SQUARE METER AND ALBUMINURIA CREATININE RATIO LESS THAN 30 MG/G (CMS/H*: ICD-10-CM

## 2024-05-01 DIAGNOSIS — N18.31 CHRONIC KIDNEY DISEASE (CKD) STAGE G3A/A1, MODERATELY DECREASED GLOMERULAR FILTRATION RATE (GFR) BETWEEN 45-59 ML/MIN/1.73 SQUARE METER AND ALBUMINURIA CREATININE RATIO LESS THAN 30 MG/G (CMS/H*: Primary | ICD-10-CM

## 2024-05-01 LAB
ANION GAP SERPL CALCULATED.3IONS-SCNC: 11.3 MMOL/L (ref 5–15)
BACTERIA UR QL AUTO: ABNORMAL /HPF
BILIRUB UR QL STRIP: NEGATIVE
BUN SERPL-MCNC: 15 MG/DL (ref 8–23)
BUN/CREAT SERPL: 11.6 (ref 7–25)
CALCIUM SPEC-SCNC: 10.1 MG/DL (ref 8.6–10.5)
CHLORIDE SERPL-SCNC: 104 MMOL/L (ref 98–107)
CLARITY UR: ABNORMAL
CO2 SERPL-SCNC: 23.7 MMOL/L (ref 22–29)
COLOR UR: YELLOW
CREAT SERPL-MCNC: 1.29 MG/DL (ref 0.57–1)
CREAT UR-MCNC: 123 MG/DL
DEPRECATED RDW RBC AUTO: 47.2 FL (ref 37–54)
EGFRCR SERPLBLD CKD-EPI 2021: 44.7 ML/MIN/1.73
ERYTHROCYTE [DISTWIDTH] IN BLOOD BY AUTOMATED COUNT: 14 % (ref 12.3–15.4)
GLUCOSE SERPL-MCNC: 121 MG/DL (ref 65–99)
GLUCOSE UR STRIP-MCNC: NEGATIVE MG/DL
HCT VFR BLD AUTO: 42.2 % (ref 34–46.6)
HGB BLD-MCNC: 13.6 G/DL (ref 12–15.9)
HGB UR QL STRIP.AUTO: ABNORMAL
KETONES UR QL STRIP: NEGATIVE
LEUKOCYTE ESTERASE UR QL STRIP.AUTO: NEGATIVE
MCH RBC QN AUTO: 29.1 PG (ref 26.6–33)
MCHC RBC AUTO-ENTMCNC: 32.2 G/DL (ref 31.5–35.7)
MCV RBC AUTO: 90.4 FL (ref 79–97)
NITRITE UR QL STRIP: NEGATIVE
PH UR STRIP.AUTO: 6 [PH] (ref 5–8)
PHOSPHATE SERPL-MCNC: 3.1 MG/DL (ref 2.5–4.5)
PLATELET # BLD AUTO: 230 10*3/MM3 (ref 140–450)
PMV BLD AUTO: 9.7 FL (ref 6–12)
POTASSIUM SERPL-SCNC: 4.3 MMOL/L (ref 3.5–5.2)
PROT ?TM UR-MCNC: 37.5 MG/DL
PROT UR QL STRIP: ABNORMAL
PROT/CREAT UR: 0.3 MG/G{CREAT}
PTH-INTACT SERPL-MCNC: 48.3 PG/ML (ref 15–65)
RBC # BLD AUTO: 4.67 10*6/MM3 (ref 3.77–5.28)
RBC # UR STRIP: ABNORMAL /HPF
REF LAB TEST METHOD: ABNORMAL
SODIUM SERPL-SCNC: 139 MMOL/L (ref 136–145)
SP GR UR STRIP: 1.02 (ref 1–1.03)
SQUAMOUS #/AREA URNS HPF: ABNORMAL /HPF
UROBILINOGEN UR QL STRIP: ABNORMAL
WBC # UR STRIP: ABNORMAL /HPF
WBC NRBC COR # BLD AUTO: 5.89 10*3/MM3 (ref 3.4–10.8)

## 2024-05-01 PROCEDURE — 80048 BASIC METABOLIC PNL TOTAL CA: CPT

## 2024-05-01 PROCEDURE — 81001 URINALYSIS AUTO W/SCOPE: CPT

## 2024-05-01 PROCEDURE — 85027 COMPLETE CBC AUTOMATED: CPT

## 2024-05-01 PROCEDURE — 83970 ASSAY OF PARATHORMONE: CPT

## 2024-05-01 PROCEDURE — 82570 ASSAY OF URINE CREATININE: CPT

## 2024-05-01 PROCEDURE — 36415 COLL VENOUS BLD VENIPUNCTURE: CPT

## 2024-05-01 PROCEDURE — 84100 ASSAY OF PHOSPHORUS: CPT

## 2024-05-01 PROCEDURE — 84156 ASSAY OF PROTEIN URINE: CPT

## 2024-05-06 DIAGNOSIS — I10 ESSENTIAL HYPERTENSION: ICD-10-CM

## 2024-05-06 RX ORDER — LOSARTAN POTASSIUM 100 MG/1
100 TABLET ORAL DAILY
Qty: 90 TABLET | Refills: 0 | Status: SHIPPED | OUTPATIENT
Start: 2024-05-06

## 2024-05-21 RX ORDER — ISOSORBIDE MONONITRATE 30 MG/1
30 TABLET, EXTENDED RELEASE ORAL DAILY
Qty: 90 TABLET | Refills: 0 | Status: SHIPPED | OUTPATIENT
Start: 2024-05-21

## 2024-06-10 DIAGNOSIS — E78.49 OTHER HYPERLIPIDEMIA: ICD-10-CM

## 2024-06-10 RX ORDER — ROSUVASTATIN CALCIUM 20 MG/1
20 TABLET, COATED ORAL DAILY
Qty: 90 TABLET | Refills: 0 | Status: SHIPPED | OUTPATIENT
Start: 2024-06-10

## 2024-07-02 ENCOUNTER — LAB (OUTPATIENT)
Dept: LAB | Facility: HOSPITAL | Age: 69
End: 2024-07-02
Payer: MEDICARE

## 2024-07-02 ENCOUNTER — OFFICE VISIT (OUTPATIENT)
Dept: FAMILY MEDICINE CLINIC | Age: 69
End: 2024-07-02
Payer: MEDICARE

## 2024-07-02 VITALS
WEIGHT: 195.8 LBS | TEMPERATURE: 97.9 F | BODY MASS INDEX: 33.43 KG/M2 | SYSTOLIC BLOOD PRESSURE: 144 MMHG | DIASTOLIC BLOOD PRESSURE: 84 MMHG | HEIGHT: 64 IN | HEART RATE: 62 BPM

## 2024-07-02 DIAGNOSIS — E78.49 OTHER HYPERLIPIDEMIA: ICD-10-CM

## 2024-07-02 DIAGNOSIS — E78.49 OTHER HYPERLIPIDEMIA: Primary | ICD-10-CM

## 2024-07-02 DIAGNOSIS — I10 ESSENTIAL HYPERTENSION: ICD-10-CM

## 2024-07-02 DIAGNOSIS — I25.10 CORONARY ARTERY DISEASE INVOLVING NATIVE CORONARY ARTERY OF NATIVE HEART WITHOUT ANGINA PECTORIS: ICD-10-CM

## 2024-07-02 DIAGNOSIS — E11.8 TYPE 2 DIABETES MELLITUS WITH COMPLICATION, WITHOUT LONG-TERM CURRENT USE OF INSULIN: ICD-10-CM

## 2024-07-02 DIAGNOSIS — H00.012 HORDEOLUM EXTERNUM OF RIGHT LOWER EYELID: ICD-10-CM

## 2024-07-02 LAB
ALBUMIN SERPL-MCNC: 4.4 G/DL (ref 3.5–5.2)
ALBUMIN/GLOB SERPL: 1.2 G/DL
ALP SERPL-CCNC: 72 U/L (ref 39–117)
ALT SERPL W P-5'-P-CCNC: 17 U/L (ref 1–33)
ANION GAP SERPL CALCULATED.3IONS-SCNC: 10.6 MMOL/L (ref 5–15)
AST SERPL-CCNC: 19 U/L (ref 1–32)
BILIRUB SERPL-MCNC: 0.6 MG/DL (ref 0–1.2)
BUN SERPL-MCNC: 26 MG/DL (ref 8–23)
BUN/CREAT SERPL: 17.3 (ref 7–25)
CALCIUM SPEC-SCNC: 10.5 MG/DL (ref 8.6–10.5)
CHLORIDE SERPL-SCNC: 105 MMOL/L (ref 98–107)
CHOLEST SERPL-MCNC: 111 MG/DL (ref 0–200)
CO2 SERPL-SCNC: 23.4 MMOL/L (ref 22–29)
CREAT SERPL-MCNC: 1.5 MG/DL (ref 0.57–1)
EGFRCR SERPLBLD CKD-EPI 2021: 37.3 ML/MIN/1.73
GLOBULIN UR ELPH-MCNC: 3.6 GM/DL
GLUCOSE SERPL-MCNC: 123 MG/DL (ref 65–99)
HBA1C MFR BLD: 6.2 % (ref 4.8–5.6)
HDLC SERPL-MCNC: 35 MG/DL (ref 40–60)
LDLC SERPL CALC-MCNC: 54 MG/DL (ref 0–100)
LDLC/HDLC SERPL: 1.48 {RATIO}
POTASSIUM SERPL-SCNC: 4.2 MMOL/L (ref 3.5–5.2)
PROT SERPL-MCNC: 8 G/DL (ref 6–8.5)
SODIUM SERPL-SCNC: 139 MMOL/L (ref 136–145)
TRIGL SERPL-MCNC: 121 MG/DL (ref 0–150)
VLDLC SERPL-MCNC: 22 MG/DL (ref 5–40)

## 2024-07-02 PROCEDURE — 3077F SYST BP >= 140 MM HG: CPT | Performed by: NURSE PRACTITIONER

## 2024-07-02 PROCEDURE — 3078F DIAST BP <80 MM HG: CPT | Performed by: NURSE PRACTITIONER

## 2024-07-02 PROCEDURE — 1126F AMNT PAIN NOTED NONE PRSNT: CPT | Performed by: NURSE PRACTITIONER

## 2024-07-02 PROCEDURE — 36415 COLL VENOUS BLD VENIPUNCTURE: CPT

## 2024-07-02 PROCEDURE — 80061 LIPID PANEL: CPT

## 2024-07-02 PROCEDURE — 3044F HG A1C LEVEL LT 7.0%: CPT | Performed by: NURSE PRACTITIONER

## 2024-07-02 PROCEDURE — 83036 HEMOGLOBIN GLYCOSYLATED A1C: CPT

## 2024-07-02 PROCEDURE — 99214 OFFICE O/P EST MOD 30 MIN: CPT | Performed by: NURSE PRACTITIONER

## 2024-07-02 PROCEDURE — 80053 COMPREHEN METABOLIC PANEL: CPT

## 2024-07-02 RX ORDER — ISOSORBIDE MONONITRATE 30 MG/1
30 TABLET, EXTENDED RELEASE ORAL DAILY
Qty: 90 TABLET | Refills: 0 | Status: SHIPPED | OUTPATIENT
Start: 2024-07-02

## 2024-07-02 RX ORDER — ROSUVASTATIN CALCIUM 20 MG/1
20 TABLET, COATED ORAL DAILY
Qty: 90 TABLET | Refills: 0 | Status: SHIPPED | OUTPATIENT
Start: 2024-07-02

## 2024-07-02 RX ORDER — METFORMIN HYDROCHLORIDE 500 MG/1
500 TABLET, EXTENDED RELEASE ORAL
Qty: 90 TABLET | Refills: 1 | Status: SHIPPED | OUTPATIENT
Start: 2024-07-02

## 2024-07-02 RX ORDER — LOSARTAN POTASSIUM 100 MG/1
100 TABLET ORAL DAILY
Qty: 90 TABLET | Refills: 0 | Status: SHIPPED | OUTPATIENT
Start: 2024-07-02

## 2024-07-02 RX ORDER — METOPROLOL SUCCINATE 100 MG/1
100 TABLET, EXTENDED RELEASE ORAL DAILY
Qty: 90 TABLET | Refills: 1 | Status: SHIPPED | OUTPATIENT
Start: 2024-07-02

## 2024-07-02 NOTE — PROGRESS NOTES
Chief Complaint  Diabetes (6 month follow up diabetes, HTN, and HLD. )    Subjective          Jesse Espinal presents to [facility]    History of Present Illness  Diabetes:  Current medication: metformin  Tolerating medication: Yes, to [facility]   Last eye exam: UTD, Dr Nicholas   Last foot exam: UTD  At home BS ranges: 130's   Lab Results       Component                Value               Date                       HGBA1C                   6.60 (H)            01/02/2024                Hypertension:  Sees cardiology and he refills brillinta   Current medication: losartan, HCTZ, norvasc and Toprol   Tolerating Medication: Yes  Checking BP at home and it is: 120-130's over 70's   Needs refills: Yes may need to [facility]   Labs:  Lab Results       Component                Value               Date                       GLUCOSE                  121 (H)             05/01/2024                 BUN                      15                  05/01/2024                 CREATININE               1.29 (H)            05/01/2024                 EGFRIFNONA               46 (L)              12/08/2021                 BCR                      11.6                05/01/2024                 K                        4.3                 05/01/2024                 CO2                      23.7                05/01/2024                 CALCIUM                  10.1                05/01/2024                 PROTENTOTREF             7.6                 05/05/2022                 ALBUMIN                  4.8                 01/02/2024                 LABIL2                   1.0                 05/05/2022                 AST                      29                  01/02/2024                 ALT                      24                  01/02/2024              Hyperlipidemia  Current medication: crestor   Tolerating medication: Yes  Needs Refill: Yes to [facility]     Lab Results       Component                Value                Date                       CHOL                     120                 2024                 CHLPL                    131                 2021                 TRIG                     116                 2024                 HDL                      39 (L)              2024                 LDL                      60                  2024              PAST MEDICAL HISTORY changes since 2024:      COVID + , mild ( was in hospital)         GYNECOLOGICAL HISTORY:             CURRENT MEDICAL PROVIDERS:    Cardiologist: Dr Dailey     Nephrology: Mariama/ Luisa      PREVENTIVE HEALTH MAINTENANCE         Colonoscopy 24, colon polyps, next due     Hepatitis C Medicare Screening: was last done ; negative         Surgical History:         Appendectomy    Cholecystectomy    Hysterectomy     Coronary Artery Stent Placement: 13 and 14 LAD; Procedures: colonoscopy 13, diverticulosis and internal hemorrhoids heart cath , severe 3 vessel CAD     Procedures: cystoscopy 16         Family History:       Father:  at age 45; Cause of death was stroke;  liver disease     Mother:  at age 75; Cause of death was colon;  Coronary Artery Disease; Hypertension;  Breast Cancer; Colon Cancer;  Type 2 Diabetes     Brother(s): 4 brother(s) total; 2 ;  Coronary Artery Disease;  Lung Cancer     Sister(s): 3 sister(s) total; 2 ;  Coronary Artery Disease; Hyperlipidemia; Neurological/Genetic Cerebrovascular Accident (  70 );  pneumonia     Son(s): 3 son(s) total;  Hypertension; Hyperlipidemia         Social History:       Occupation:retired  [employer]       Marital Status:  2023     Children: 3 children                          Past Medical History:   Diagnosis Date    Acid reflux disease     Angina pectoris     Anxiety     CAD (coronary artery disease)     Depression     Diabetes mellitus, type II     Dysuria      Family history of colon cancer     Fatigue     Foot pain     GERD (gastroesophageal reflux disease)     Hematuria     Hypercholesterolemia     Hypertension     MORRIS (obstructive sleep apnea)     CPAP       Allergies   Allergen Reactions    Shellfish-Derived Products Hives and Swelling    Sulfa Antibiotics Swelling    Contrast Dye (Echo Or Unknown Ct/Mr) Swelling    Effient [Prasugrel] Hives        Past Surgical History:   Procedure Laterality Date    APPENDECTOMY      CARDIAC CATHETERIZATION  07/2013    SEVERE 3 VESSEL CAD    CHOLECYSTECTOMY  01/22/2013    DIVERTICULOSIS AND INTERNAL HEMORRHOIDS    COLONOSCOPY      COLONOSCOPY  01/30/2024    COLONOSCOPY N/A 1/30/2024    Procedure: COLONOSCOPY to cecum ti with hot/cold snare biopsies/polypectomies with clip x2;  Surgeon: Clair Reed MD;  Location: [facility] ENDOSCOPY;  Service: Gastroenterology;  Laterality: N/A;  pre fam hx colon ca  post polyps diverticulosis hemorrhoids    CORONARY ANGIOPLASTY WITH STENT PLACEMENT  x2    CORONARY STENT PLACEMENT  07/30/2013    LAD x4    CYSTOSCOPY  08/04/2016    HYSTERECTOMY      OOPHORECTOMY          Social History     Tobacco Use    Smoking status: Former     Current packs/day: 0.00     Average packs/day: 2.5 packs/day for 40.0 years (100.0 ttl pk-yrs)     Types: Cigarettes     Start date: 1969     Quit date: 2009     Years since quitting: 15.5    Smokeless tobacco: Never   Substance Use Topics    Alcohol use: Yes     Alcohol/week: 1.0 standard drink of alcohol     Types: 1 Glasses of wine per week     Comment: occ       Family History   Problem Relation Age of Onset    Colon cancer Mother     Coronary artery disease Mother     Hypertension Mother     Breast cancer Mother     Diabetes Mother     Heart disease Mother     Stroke Father     Liver disease Father     Coronary artery disease Sister     Hyperlipidemia Sister     Coronary artery disease Brother     Lung cancer Brother     Hypertension Son     No Known Problems  Maternal Grandmother     No Known Problems Maternal Grandfather     No Known Problems Paternal Grandmother     No Known Problems Paternal Grandfather     Malig Hyperthermia Neg Hx         Health Maintenance Due   Topic Date Due    TDAP/TD VACCINES (1 - Tdap) Never done    ZOSTER VACCINE (2 of 3) 11/15/2008        Current Outpatient Medications on File Prior to Visit   Medication Sig    acetaminophen (TYLENOL) 325 MG tablet Take 1 tablet by mouth Every 6 (Six) Hours As Needed.    amLODIPine (NORVASC) 5 MG tablet Take 1 tablet by mouth Daily. (Patient taking differently: Take 2 tablets by mouth Daily.)    Blood Glucose Monitoring Suppl device 1 each 2 (Two) Times a Day. Pt to use to check blood sugar twice daily for E11.9    Brilinta 60 MG tablet tablet TAKE 1 TABLET BY MOUTH EVERY 12 HOURS    docusate sodium 100 MG capsule Take 1 capsule by mouth.    glucose blood (OneTouch Ultra) test strip 1 each by Other route See Admin Instructions. Use once to twice daily as needed    glucose blood test strip Use to check blood sugar twice daily for E11.9    hydroCHLOROthiazide (HYDRODIURIL) 12.5 MG tablet Take 1 tablet by mouth Every Night.    Lancets misc 1 each 2 (Two) Times a Day. Use to check blood sugar twice daily for E11.9     No current facility-administered medications on file prior to visit.       Immunization History   Administered Date(s) Administered    COVID-19 (PFIZER) Purple Cap Monovalent 02/26/2021, 03/25/2021, 12/08/2021    Fluzone High Dose =>65 Years ([employer] ONLY) 11/07/2022    Fluzone High-Dose 65+yrs 11/04/2021, 11/07/2022    Influenza, Unspecified 10/01/2020    PEDS-Pneumococcal Conjugate (PCV7) 12/14/2022    Pneumococcal Conjugate 20-Valent (PCV20) 12/14/2022    Zostavax 09/20/2008       Review of Systems   Constitutional:  Negative for fatigue and fever.   Eyes:         Persistent stye in her right eye lower lid    Respiratory:  Negative for cough and shortness of breath.    Cardiovascular:  Positive  "for leg swelling (slight). Negative for chest pain and palpitations.        Objective     Vitals:    07/02/24 0858 07/02/24 0923   BP: 148/74 144/84   BP Location: Left arm Left arm   Patient Position: Sitting Sitting   Cuff Size: Large Adult Large Adult   Pulse: 70 62   Temp: 97.9 °F (36.6 °C)    TempSrc: Oral    Weight: 88.8 kg (195 lb 12.8 oz)    Height: 162.6 cm (64\")             Physical Exam  Vitals reviewed.   Constitutional:       General: She is not in acute distress.     Appearance: Normal appearance.   Eyes:      Comments: Stye to Right eye    Neck:      Vascular: No carotid bruit.   Cardiovascular:      Rate and Rhythm: Normal rate and regular rhythm.      Heart sounds: Normal heart sounds. No murmur heard.  Pulmonary:      Effort: Pulmonary effort is normal. No respiratory distress.      Breath sounds: Normal breath sounds.   Musculoskeletal:      Right lower leg: No edema.      Left lower leg: No edema.   Neurological:      Mental Status: She is alert.   Psychiatric:         Mood and Affect: Mood normal.         Behavior: Behavior normal.         Result Review :     The following data was reviewed by: SPIKE Jj on 07/02/2024:                       Assessment and Plan      Diagnoses and all orders for this visit:    1. Other hyperlipidemia (Primary)  Assessment & Plan:   Continue current medication and efforts with diet and exercise.       Orders:  -     Comprehensive Metabolic Panel; Future  -     Lipid Panel; Future  -     rosuvastatin (CRESTOR) 20 MG tablet; Take 1 tablet by mouth Daily.  Dispense: 90 tablet; Refill: 0    2. Type 2 diabetes mellitus with complication, without long-term current use of insulin  Assessment & Plan:  To work on diet, regular exercise, monitor sugars.      Orders:  -     Comprehensive Metabolic Panel; Future  -     Lipid Panel; Future  -     Hemoglobin A1c; Future  -     metFORMIN ER (GLUCOPHAGE-XR) 500 MG 24 hr tablet; Take 1 tablet by mouth Daily With " Breakfast.  Dispense: 90 tablet; Refill: 1    3. Essential hypertension  Assessment & Plan:  Repeated bp, continue to monitor BP and follow up with cardiology as directed; she is fasting today, to work on her diet and lowering salt in her diet and elevate her legs (she just back from a car trip to [place])     Orders:  -     Comprehensive Metabolic Panel; Future  -     losartan (COZAAR) 100 MG tablet; Take 1 tablet by mouth Daily.  Dispense: 90 tablet; Refill: 0  -     metoprolol succinate XL (TOPROL-XL) 100 MG 24 hr tablet; Take 1 tablet by mouth Daily.  Dispense: 90 tablet; Refill: 1    4. Hordeolum externum of right lower eyelid  Assessment & Plan:  Use warm compresses, she has seen her eye specialist but has not followed up with her since first getting the stye, it has persisted, to make a follow up appt this week       5. Coronary artery disease involving native coronary artery of native heart without angina pectoris  -     isosorbide mononitrate (IMDUR) 30 MG 24 hr tablet; Take 1 tablet by mouth Daily.  Dispense: 90 tablet; Refill: 0                    Follow Up     Return for followup pending lab results.    Patient was given instructions and counseling regarding her condition or for health maintenance advice. Please see specific information pulled into the AVS if appropriate.

## 2024-07-02 NOTE — ASSESSMENT & PLAN NOTE
Repeated bp, continue to monitor BP and follow up with cardiology as directed; she is fasting today, to work on her diet and lowering salt in her diet and elevate her legs (she just back from a car trip to [place])

## 2024-07-02 NOTE — ASSESSMENT & PLAN NOTE
Use warm compresses, she has seen her eye specialist but has not followed up with her since first getting the stye, it has persisted, to make a follow up appt this week

## 2024-08-13 ENCOUNTER — LAB (OUTPATIENT)
Dept: LAB | Facility: HOSPITAL | Age: 69
End: 2024-08-13
Payer: MEDICARE

## 2024-08-13 ENCOUNTER — OFFICE VISIT (OUTPATIENT)
Dept: CARDIOLOGY | Facility: CLINIC | Age: 69
End: 2024-08-13
Payer: MEDICARE

## 2024-08-13 VITALS
DIASTOLIC BLOOD PRESSURE: 74 MMHG | HEIGHT: 64 IN | HEART RATE: 58 BPM | SYSTOLIC BLOOD PRESSURE: 128 MMHG | WEIGHT: 193 LBS | BODY MASS INDEX: 32.95 KG/M2

## 2024-08-13 DIAGNOSIS — R60.0 BILATERAL LOWER EXTREMITY EDEMA: ICD-10-CM

## 2024-08-13 DIAGNOSIS — I10 ESSENTIAL HYPERTENSION: ICD-10-CM

## 2024-08-13 DIAGNOSIS — E78.49 OTHER HYPERLIPIDEMIA: ICD-10-CM

## 2024-08-13 DIAGNOSIS — I25.10 CORONARY ARTERY DISEASE INVOLVING NATIVE CORONARY ARTERY OF NATIVE HEART WITHOUT ANGINA PECTORIS: Primary | ICD-10-CM

## 2024-08-13 LAB
ALBUMIN SERPL-MCNC: 5 G/DL (ref 3.5–5.2)
ALBUMIN/GLOB SERPL: 1.3 G/DL
ALP SERPL-CCNC: 82 U/L (ref 39–117)
ALT SERPL W P-5'-P-CCNC: 21 U/L (ref 1–33)
ANION GAP SERPL CALCULATED.3IONS-SCNC: 12.3 MMOL/L (ref 5–15)
AST SERPL-CCNC: 20 U/L (ref 1–32)
BILIRUB SERPL-MCNC: 0.6 MG/DL (ref 0–1.2)
BUN SERPL-MCNC: 22 MG/DL (ref 8–23)
BUN/CREAT SERPL: 14.4 (ref 7–25)
CALCIUM SPEC-SCNC: 11.4 MG/DL (ref 8.6–10.5)
CHLORIDE SERPL-SCNC: 98 MMOL/L (ref 98–107)
CO2 SERPL-SCNC: 25.7 MMOL/L (ref 22–29)
CREAT SERPL-MCNC: 1.53 MG/DL (ref 0.57–1)
EGFRCR SERPLBLD CKD-EPI 2021: 36.5 ML/MIN/1.73
GLOBULIN UR ELPH-MCNC: 3.9 GM/DL
GLUCOSE SERPL-MCNC: 104 MG/DL (ref 65–99)
POTASSIUM SERPL-SCNC: 4.1 MMOL/L (ref 3.5–5.2)
PROT SERPL-MCNC: 8.9 G/DL (ref 6–8.5)
SODIUM SERPL-SCNC: 136 MMOL/L (ref 136–145)
TSH SERPL DL<=0.05 MIU/L-ACNC: 2.67 UIU/ML (ref 0.27–4.2)

## 2024-08-13 PROCEDURE — 1159F MED LIST DOCD IN RCRD: CPT | Performed by: NURSE PRACTITIONER

## 2024-08-13 PROCEDURE — 99214 OFFICE O/P EST MOD 30 MIN: CPT | Performed by: NURSE PRACTITIONER

## 2024-08-13 PROCEDURE — 3078F DIAST BP <80 MM HG: CPT | Performed by: NURSE PRACTITIONER

## 2024-08-13 PROCEDURE — 80053 COMPREHEN METABOLIC PANEL: CPT

## 2024-08-13 PROCEDURE — 36415 COLL VENOUS BLD VENIPUNCTURE: CPT

## 2024-08-13 PROCEDURE — 1160F RVW MEDS BY RX/DR IN RCRD: CPT | Performed by: NURSE PRACTITIONER

## 2024-08-13 PROCEDURE — 3074F SYST BP LT 130 MM HG: CPT | Performed by: NURSE PRACTITIONER

## 2024-08-13 PROCEDURE — 93000 ELECTROCARDIOGRAM COMPLETE: CPT | Performed by: NURSE PRACTITIONER

## 2024-08-13 PROCEDURE — 84443 ASSAY THYROID STIM HORMONE: CPT

## 2024-08-13 RX ORDER — FUROSEMIDE 20 MG/1
10 TABLET ORAL DAILY
COMMUNITY
End: 2024-08-13

## 2024-08-13 RX ORDER — HYDROCHLOROTHIAZIDE 25 MG/1
25 TABLET ORAL NIGHTLY
Qty: 90 TABLET | Refills: 0 | Status: SHIPPED | OUTPATIENT
Start: 2024-08-13

## 2024-08-13 RX ORDER — AMLODIPINE BESYLATE 5 MG/1
5 TABLET ORAL DAILY
Qty: 90 TABLET | Refills: 0 | Status: SHIPPED | OUTPATIENT
Start: 2024-08-13

## 2024-08-13 NOTE — PROGRESS NOTES
Date of Office Visit: 2024  Encounter Provider: SPIKE Hernandez  Place of Service: [facility] CARDIOLOGY  Patient Name: Jesse Espinal  :1955    Chief Complaint   Patient presents with    Coronary Artery Disease   :     HPI: Jesse Espinal is a 69 y.o. female patient of Dr. Dailey's with coronary artery disease.  She has a history of stenting in  and .  It sounds like she was a Plavix nonresponder, and she has been on Brilinta ever since.    She was last seen in the office by Dr. Dailey in 2023 at which time she was doing well.  No changes were made to her regimen, and she was advised to follow-up in 1 year.    Overall, she has been doing well.  She denies any chest pain, shortness of breath, palpitations, dizziness, syncope, bleeding difficulties or melena.  She has developed bilateral lower extremity edema.  Few days ago, she started taking Lasix from an old prescription.  She has been traveling a lot this summer and spending a lot of time in the car.    Past Medical History:   Diagnosis Date    Acid reflux disease     Angina pectoris     Anxiety     CAD (coronary artery disease)     Depression     Diabetes mellitus, type II     Dysuria     Family history of colon cancer     Fatigue     Foot pain     GERD (gastroesophageal reflux disease)     Hematuria     Hypercholesterolemia     Hypertension     MORRIS (obstructive sleep apnea)     CPAP       Past Surgical History:   Procedure Laterality Date    APPENDECTOMY      CARDIAC CATHETERIZATION  2013    SEVERE 3 VESSEL CAD    CHOLECYSTECTOMY  2013    DIVERTICULOSIS AND INTERNAL HEMORRHOIDS    COLONOSCOPY      COLONOSCOPY  2024    COLONOSCOPY N/A 2024    Procedure: COLONOSCOPY to cecum ti with hot/cold snare biopsies/polypectomies with clip x2;  Surgeon: Clair Reed MD;  Location: [facility] ENDOSCOPY;  Service: Gastroenterology;  Laterality: N/A;  pre fam hx colon ca  post  polyps diverticulosis hemorrhoids    CORONARY ANGIOPLASTY WITH STENT PLACEMENT  x2    CORONARY STENT PLACEMENT  07/30/2013    LAD x4    CYSTOSCOPY  08/04/2016    HYSTERECTOMY      OOPHORECTOMY         Social History     Socioeconomic History    Marital status:    Tobacco Use    Smoking status: Former     Current packs/day: 0.00     Average packs/day: 2.5 packs/day for 40.0 years (100.0 ttl pk-yrs)     Types: Cigarettes     Start date: 1969     Quit date: 2009     Years since quitting: 15.6    Smokeless tobacco: Never   Vaping Use    Vaping status: Never Used   Substance and Sexual Activity    Alcohol use: Yes     Alcohol/week: 1.0 standard drink of alcohol     Types: 1 Glasses of wine per week     Comment: occ    Drug use: No    Sexual activity: Defer       Family History   Problem Relation Age of Onset    Colon cancer Mother     Coronary artery disease Mother     Hypertension Mother     Breast cancer Mother     Diabetes Mother     Heart disease Mother     Stroke Father     Liver disease Father     Coronary artery disease Sister     Hyperlipidemia Sister     Coronary artery disease Brother     Lung cancer Brother     Hypertension Son     No Known Problems Maternal Grandmother     No Known Problems Maternal Grandfather     No Known Problems Paternal Grandmother     No Known Problems Paternal Grandfather     Malig Hyperthermia Neg Hx        Review of Systems   Constitutional: Negative.   Cardiovascular:  Positive for leg swelling. Negative for chest pain, dyspnea on exertion, orthopnea, paroxysmal nocturnal dyspnea and syncope.   Respiratory: Negative.     Hematologic/Lymphatic: Negative for bleeding problem.   Musculoskeletal:  Negative for falls.   Gastrointestinal:  Negative for melena.   Neurological:  Negative for dizziness and light-headedness.       Allergies   Allergen Reactions    Shellfish-Derived Products Hives and Swelling    Sulfa Antibiotics Swelling    Contrast Dye (Echo Or Unknown Ct/Mr)  "Swelling    Effient [Prasugrel] Hives         Current Outpatient Medications:     acetaminophen (TYLENOL) 325 MG tablet, Take 1 tablet by mouth Every 6 (Six) Hours As Needed., Disp: , Rfl:     amLODIPine (NORVASC) 5 MG tablet, Take 1 tablet by mouth Daily., Disp: 90 tablet, Rfl: 0    Blood Glucose Monitoring Suppl device, 1 each 2 (Two) Times a Day. Pt to use to check blood sugar twice daily for E11.9, Disp: 1 each, Rfl: 0    Brilinta 60 MG tablet tablet, TAKE 1 TABLET BY MOUTH EVERY 12 HOURS, Disp: 180 tablet, Rfl: 2    docusate sodium 100 MG capsule, Take 1 capsule by mouth., Disp: , Rfl:     glucose blood (OneTouch Ultra) test strip, 1 each by Other route See Admin Instructions. Use once to twice daily as needed, Disp: 100 each, Rfl: 3    glucose blood test strip, Use to check blood sugar twice daily for E11.9, Disp: 200 each, Rfl: 12    hydroCHLOROthiazide 25 MG tablet, Take 1 tablet by mouth Every Night., Disp: 90 tablet, Rfl: 0    isosorbide mononitrate (IMDUR) 30 MG 24 hr tablet, Take 1 tablet by mouth Daily., Disp: 90 tablet, Rfl: 0    Lancets misc, 1 each 2 (Two) Times a Day. Use to check blood sugar twice daily for E11.9, Disp: 200 each, Rfl: 3    losartan (COZAAR) 100 MG tablet, Take 1 tablet by mouth Daily., Disp: 90 tablet, Rfl: 0    metFORMIN ER (GLUCOPHAGE-XR) 500 MG 24 hr tablet, Take 1 tablet by mouth Daily With Breakfast., Disp: 90 tablet, Rfl: 1    metoprolol succinate XL (TOPROL-XL) 100 MG 24 hr tablet, Take 1 tablet by mouth Daily., Disp: 90 tablet, Rfl: 1    rosuvastatin (CRESTOR) 20 MG tablet, Take 1 tablet by mouth Daily., Disp: 90 tablet, Rfl: 0      Objective:     Vitals:    08/13/24 1301   BP: 128/74   Pulse: 58   Weight: 87.5 kg (193 lb)   Height: 162.6 cm (64\")     Body mass index is 33.13 kg/m².    PHYSICAL EXAM:    Neck:      Vascular: No JVD.   Pulmonary:      Effort: Pulmonary effort is normal.      Breath sounds: Normal breath sounds.   Cardiovascular:      Normal rate. Regular " rhythm.      Murmurs: There is no murmur.      No gallop.  No click. No rub.   Pulses:     Intact distal pulses.   Edema:     Peripheral edema present.        ECG 12 Lead    Date/Time: 8/13/2024 1:09 PM  Performed by: Hiwot Vidal APRN    Authorized by: Hiwot Vidal APRN  Comparison: compared with previous ECG from 8/2/2023  Similar to previous ECG  Rhythm: sinus rhythm  Rate: normal  BPM: 58            Assessment:       Diagnosis Plan   1. Coronary artery disease involving native coronary artery of native heart without angina pectoris  ECG 12 Lead      2. Essential hypertension  amLODIPine (NORVASC) 5 MG tablet      3. Other hyperlipidemia        4. Bilateral lower extremity edema  Comprehensive Metabolic Panel    TSH        Orders Placed This Encounter   Procedures    Comprehensive Metabolic Panel     Standing Status:   Future     Standing Expiration Date:   8/13/2025     Order Specific Question:   Release to patient     Answer:   Routine Release [[id number]]    TSH     Standing Status:   Future     Standing Expiration Date:   8/13/2025     Order Specific Question:   Release to patient     Answer:   Routine Release [[id number]]    ECG 12 Lead     This order was created via procedure documentation     Order Specific Question:   Release to patient     Answer:   Routine Release [[id number]]          Plan:       1.  Coronary artery disease.  She denies any symptoms of angina.  Continue Brilinta and rosuvastatin.      2.  Hypertension.  Her blood pressure is stable.  I suspect the swelling is secondary to high-dose amlodipine.  I recommended decreasing the dose to 5 mg and increasing the hydrochlorothiazide to 25 mg.  I recommended she discontinue the Lasix.      3.  Hyperlipidemia.  Continue rosuvastatin.      Overall I think she is doing well.  She will see me back in 2 weeks to follow-up on her blood pressure and swelling.      As always, it has been a pleasure to participate in your patient's  care.      Sincerely,         SPIKE Mishra

## 2024-08-27 ENCOUNTER — OFFICE VISIT (OUTPATIENT)
Dept: CARDIOLOGY | Facility: CLINIC | Age: 69
End: 2024-08-27
Payer: MEDICARE

## 2024-08-27 ENCOUNTER — LAB (OUTPATIENT)
Dept: LAB | Facility: HOSPITAL | Age: 69
End: 2024-08-27
Payer: MEDICARE

## 2024-08-27 VITALS
BODY MASS INDEX: 32.95 KG/M2 | HEIGHT: 64 IN | WEIGHT: 193 LBS | HEART RATE: 60 BPM | DIASTOLIC BLOOD PRESSURE: 88 MMHG | OXYGEN SATURATION: 96 % | SYSTOLIC BLOOD PRESSURE: 130 MMHG

## 2024-08-27 DIAGNOSIS — M79.89 SWELLING OF BOTH LOWER EXTREMITIES: Primary | ICD-10-CM

## 2024-08-27 DIAGNOSIS — I10 ESSENTIAL HYPERTENSION: ICD-10-CM

## 2024-08-27 LAB
ANION GAP SERPL CALCULATED.3IONS-SCNC: 11.7 MMOL/L (ref 5–15)
BUN SERPL-MCNC: 13 MG/DL (ref 8–23)
BUN/CREAT SERPL: 10.7 (ref 7–25)
CALCIUM SPEC-SCNC: 10.6 MG/DL (ref 8.6–10.5)
CHLORIDE SERPL-SCNC: 103 MMOL/L (ref 98–107)
CO2 SERPL-SCNC: 24.3 MMOL/L (ref 22–29)
CREAT SERPL-MCNC: 1.21 MG/DL (ref 0.57–1)
EGFRCR SERPLBLD CKD-EPI 2021: 48.3 ML/MIN/1.73
GLUCOSE SERPL-MCNC: 106 MG/DL (ref 65–99)
POTASSIUM SERPL-SCNC: 4.3 MMOL/L (ref 3.5–5.2)
SODIUM SERPL-SCNC: 139 MMOL/L (ref 136–145)

## 2024-08-27 PROCEDURE — 99214 OFFICE O/P EST MOD 30 MIN: CPT | Performed by: NURSE PRACTITIONER

## 2024-08-27 PROCEDURE — 36415 COLL VENOUS BLD VENIPUNCTURE: CPT

## 2024-08-27 PROCEDURE — 3079F DIAST BP 80-89 MM HG: CPT | Performed by: NURSE PRACTITIONER

## 2024-08-27 PROCEDURE — 80048 BASIC METABOLIC PNL TOTAL CA: CPT

## 2024-08-27 PROCEDURE — 3075F SYST BP GE 130 - 139MM HG: CPT | Performed by: NURSE PRACTITIONER

## 2024-08-27 PROCEDURE — 93000 ELECTROCARDIOGRAM COMPLETE: CPT | Performed by: NURSE PRACTITIONER

## 2024-08-27 PROCEDURE — 1160F RVW MEDS BY RX/DR IN RCRD: CPT | Performed by: NURSE PRACTITIONER

## 2024-08-27 PROCEDURE — 1159F MED LIST DOCD IN RCRD: CPT | Performed by: NURSE PRACTITIONER

## 2024-08-27 RX ORDER — MAGNESIUM CARB/ALUMINUM HYDROX 105-160MG
150 TABLET,CHEWABLE ORAL ONCE
COMMUNITY
End: 2024-08-27

## 2024-08-27 NOTE — PROGRESS NOTES
Date of Office Visit: 2024  Encounter Provider: SPIKE Hernandez  Place of Service: [facility] CARDIOLOGY  Patient Name: Jesse Espinal  :1955    Chief Complaint   Patient presents with    Edema   :     HPI: Jesse Espinal is a 69 y.o. female patient of Dr. Santiago with coronary artery disease. She has a history of stenting in  and . It sounds like she was a Plavix nonresponder, and she has been on Brilinta ever since.     I saw her in the office on  for bilateral lower extremity edema.  I suspected the edema was secondary to high-dose amlodipine and recommended decreasing the dose to 5 mg and increasing the HCTZ to 25 mg.  She had also been taking an old prescription of Lasix which I discouraged.  I also sent her for labs.  She was advised to follow-up in 2 weeks.    Fortunately the swelling has improved.  Reportedly she feels better than she has in some time.  She denies any chest pain, shortness of breath, palpitations,  dizziness, or syncope.    Past Medical History:   Diagnosis Date    Acid reflux disease     Angina pectoris     Anxiety     CAD (coronary artery disease)     Depression     Diabetes mellitus, type II     Dysuria     Family history of colon cancer     Fatigue     Foot pain     GERD (gastroesophageal reflux disease)     Hematuria     Hypercholesterolemia     Hypertension     MORRIS (obstructive sleep apnea)     CPAP       Past Surgical History:   Procedure Laterality Date    APPENDECTOMY      CARDIAC CATHETERIZATION  2013    SEVERE 3 VESSEL CAD    CHOLECYSTECTOMY  2013    DIVERTICULOSIS AND INTERNAL HEMORRHOIDS    COLONOSCOPY      COLONOSCOPY  2024    COLONOSCOPY N/A 2024    Procedure: COLONOSCOPY to cecum ti with hot/cold snare biopsies/polypectomies with clip x2;  Surgeon: Clair Reed MD;  Location: [facility] ENDOSCOPY;  Service: Gastroenterology;  Laterality: N/A;  pre fam hx colon ca  post polyps  diverticulosis hemorrhoids    CORONARY ANGIOPLASTY WITH STENT PLACEMENT  x2    CORONARY STENT PLACEMENT  07/30/2013    LAD x4    CYSTOSCOPY  08/04/2016    HYSTERECTOMY      OOPHORECTOMY         Social History     Socioeconomic History    Marital status:    Tobacco Use    Smoking status: Former     Current packs/day: 0.00     Average packs/day: 2.5 packs/day for 40.0 years (100.0 ttl pk-yrs)     Types: Cigarettes     Start date: 1969     Quit date: 2009     Years since quitting: 15.6    Smokeless tobacco: Never   Vaping Use    Vaping status: Never Used   Substance and Sexual Activity    Alcohol use: Yes     Alcohol/week: 1.0 standard drink of alcohol     Types: 1 Glasses of wine per week     Comment: occ    Drug use: No    Sexual activity: Defer       Family History   Problem Relation Age of Onset    Colon cancer Mother     Coronary artery disease Mother     Hypertension Mother     Breast cancer Mother     Diabetes Mother     Heart disease Mother     Stroke Father     Liver disease Father     Coronary artery disease Sister     Hyperlipidemia Sister     Coronary artery disease Brother     Lung cancer Brother     Hypertension Son     No Known Problems Maternal Grandmother     No Known Problems Maternal Grandfather     No Known Problems Paternal Grandmother     No Known Problems Paternal Grandfather     Malig Hyperthermia Neg Hx        Review of Systems   Constitutional: Negative.   Cardiovascular: Negative.  Negative for chest pain, dyspnea on exertion, leg swelling, orthopnea, paroxysmal nocturnal dyspnea and syncope.   Respiratory: Negative.     Hematologic/Lymphatic: Negative for bleeding problem.   Musculoskeletal:  Negative for falls.   Gastrointestinal:  Negative for melena.   Neurological:  Negative for dizziness and light-headedness.       Allergies   Allergen Reactions    Shellfish-Derived Products Hives and Swelling    Sulfa Antibiotics Swelling    Contrast Dye (Echo Or Unknown Ct/Mr) Swelling     "Effient [Prasugrel] Hives         Current Outpatient Medications:     acetaminophen (TYLENOL) 325 MG tablet, Take 1 tablet by mouth Every 6 (Six) Hours As Needed., Disp: , Rfl:     amLODIPine (NORVASC) 5 MG tablet, Take 1 tablet by mouth Daily., Disp: 90 tablet, Rfl: 0    Blood Glucose Monitoring Suppl device, 1 each 2 (Two) Times a Day. Pt to use to check blood sugar twice daily for E11.9, Disp: 1 each, Rfl: 0    Brilinta 60 MG tablet tablet, TAKE 1 TABLET BY MOUTH EVERY 12 HOURS, Disp: 180 tablet, Rfl: 2    docusate sodium 100 MG capsule, Take 1 capsule by mouth., Disp: , Rfl:     glucose blood (OneTouch Ultra) test strip, 1 each by Other route See Admin Instructions. Use once to twice daily as needed, Disp: 100 each, Rfl: 3    glucose blood test strip, Use to check blood sugar twice daily for E11.9, Disp: 200 each, Rfl: 12    hydroCHLOROthiazide 25 MG tablet, Take 1 tablet by mouth Every Night., Disp: 90 tablet, Rfl: 0    isosorbide mononitrate (IMDUR) 30 MG 24 hr tablet, Take 1 tablet by mouth Daily., Disp: 90 tablet, Rfl: 0    Lancets misc, 1 each 2 (Two) Times a Day. Use to check blood sugar twice daily for E11.9, Disp: 200 each, Rfl: 3    losartan (COZAAR) 100 MG tablet, Take 1 tablet by mouth Daily., Disp: 90 tablet, Rfl: 0    MAGNESIUM CITRATE PO, Take  by mouth., Disp: , Rfl:     metFORMIN ER (GLUCOPHAGE-XR) 500 MG 24 hr tablet, Take 1 tablet by mouth Daily With Breakfast., Disp: 90 tablet, Rfl: 1    metoprolol succinate XL (TOPROL-XL) 100 MG 24 hr tablet, Take 1 tablet by mouth Daily., Disp: 90 tablet, Rfl: 1    rosuvastatin (CRESTOR) 20 MG tablet, Take 1 tablet by mouth Daily., Disp: 90 tablet, Rfl: 0      Objective:     Vitals:    08/27/24 1022   BP: 130/88   Pulse: 60   SpO2: 96%   Weight: 87.5 kg (193 lb)   Height: 162.6 cm (64\")     Body mass index is 33.13 kg/m².    PHYSICAL EXAM:    Neck:      Vascular: No JVD.   Pulmonary:      Effort: Pulmonary effort is normal.      Breath sounds: Normal breath " sounds.   Cardiovascular:      Normal rate. Regular rhythm.      Murmurs: There is no murmur.      No gallop.  No click. No rub.   Pulses:     Intact distal pulses.           ECG 12 Lead    Date/Time: 8/27/2024 10:34 AM  Performed by: Hiwot Vidal APRN    Authorized by: Hiwot Vidal APRN  Comparison: compared with previous ECG from 8/2/2023  Similar to previous ECG  Rhythm: sinus bradycardia  Rate: bradycardic  BPM: 54            Assessment:       Diagnosis Plan   1. Swelling of both lower extremities        2. Essential hypertension  ECG 12 Lead    Basic Metabolic Panel        Orders Placed This Encounter   Procedures    Basic Metabolic Panel     Standing Status:   Future     Standing Expiration Date:   8/27/2025     Order Specific Question:   Release to patient     Answer:   Routine Release [[id number]]    ECG 12 Lead     This order was created via procedure documentation     Order Specific Question:   Release to patient     Answer:   Routine Release [[id number]]          Plan:       1.  Lower extremity edema.  Improved on reduced dose of amlodipine.      2.  Hypertension.  Her blood pressure is stable.  I would like to repeat a BMP today since increasing the dose of HCTZ.      I will call her with the BMP results.  Otherwise, she will follow-up with Dr. Dailey in 1 year.      As always, it has been a pleasure to participate in your patient's care.      Sincerely,         SPIKE Mishra

## 2024-10-29 DIAGNOSIS — I10 ESSENTIAL HYPERTENSION: ICD-10-CM

## 2024-10-29 RX ORDER — LOSARTAN POTASSIUM 100 MG/1
100 TABLET ORAL DAILY
Qty: 90 TABLET | Refills: 0 | Status: SHIPPED | OUTPATIENT
Start: 2024-10-29

## 2024-10-29 RX ORDER — TICAGRELOR 60 MG/1
TABLET ORAL
Qty: 60 TABLET | Refills: 3 | Status: SHIPPED | OUTPATIENT
Start: 2024-10-29

## 2024-10-31 ENCOUNTER — TRANSCRIBE ORDERS (OUTPATIENT)
Dept: ADMINISTRATIVE | Facility: HOSPITAL | Age: 69
End: 2024-10-31
Payer: MEDICARE

## 2024-10-31 ENCOUNTER — LAB (OUTPATIENT)
Dept: LAB | Facility: HOSPITAL | Age: 69
End: 2024-10-31
Payer: MEDICARE

## 2024-10-31 DIAGNOSIS — N18.31 CHRONIC KIDNEY DISEASE (CKD) STAGE G3A/A1, MODERATELY DECREASED GLOMERULAR FILTRATION RATE (GFR) BETWEEN 45-59 ML/MIN/1.73 SQUARE METER AND ALBUMINURIA CREATININE RATIO LESS THAN 30 MG/G (CMS/H*: ICD-10-CM

## 2024-10-31 DIAGNOSIS — N18.31 CHRONIC KIDNEY DISEASE (CKD) STAGE G3A/A1, MODERATELY DECREASED GLOMERULAR FILTRATION RATE (GFR) BETWEEN 45-59 ML/MIN/1.73 SQUARE METER AND ALBUMINURIA CREATININE RATIO LESS THAN 30 MG/G (CMS/H*: Primary | ICD-10-CM

## 2024-10-31 LAB
ALBUMIN SERPL-MCNC: 4.3 G/DL (ref 3.5–5.2)
ANION GAP SERPL CALCULATED.3IONS-SCNC: 12.1 MMOL/L (ref 5–15)
BACTERIA UR QL AUTO: ABNORMAL /HPF
BASOPHILS # BLD AUTO: 0.05 10*3/MM3 (ref 0–0.2)
BASOPHILS NFR BLD AUTO: 0.8 % (ref 0–1.5)
BILIRUB UR QL STRIP: NEGATIVE
BUN SERPL-MCNC: 24 MG/DL (ref 8–23)
BUN/CREAT SERPL: 17.1 (ref 7–25)
CALCIUM SPEC-SCNC: 10.7 MG/DL (ref 8.6–10.5)
CHLORIDE SERPL-SCNC: 102 MMOL/L (ref 98–107)
CLARITY UR: CLEAR
CO2 SERPL-SCNC: 22.9 MMOL/L (ref 22–29)
COLOR UR: YELLOW
CREAT SERPL-MCNC: 1.4 MG/DL (ref 0.57–1)
CREAT UR-MCNC: 104.2 MG/DL
DEPRECATED RDW RBC AUTO: 46.2 FL (ref 37–54)
EGFRCR SERPLBLD CKD-EPI 2021: 40.6 ML/MIN/1.73
EOSINOPHIL # BLD AUTO: 0.23 10*3/MM3 (ref 0–0.4)
EOSINOPHIL NFR BLD AUTO: 3.5 % (ref 0.3–6.2)
ERYTHROCYTE [DISTWIDTH] IN BLOOD BY AUTOMATED COUNT: 13.7 % (ref 12.3–15.4)
GLUCOSE SERPL-MCNC: 120 MG/DL (ref 65–99)
GLUCOSE UR STRIP-MCNC: NEGATIVE MG/DL
HCT VFR BLD AUTO: 38.8 % (ref 34–46.6)
HGB BLD-MCNC: 12.7 G/DL (ref 12–15.9)
HGB UR QL STRIP.AUTO: ABNORMAL
IMM GRANULOCYTES # BLD AUTO: 0.02 10*3/MM3 (ref 0–0.05)
IMM GRANULOCYTES NFR BLD AUTO: 0.3 % (ref 0–0.5)
KETONES UR QL STRIP: NEGATIVE
LEUKOCYTE ESTERASE UR QL STRIP.AUTO: NEGATIVE
LYMPHOCYTES # BLD AUTO: 1.65 10*3/MM3 (ref 0.7–3.1)
LYMPHOCYTES NFR BLD AUTO: 25.1 % (ref 19.6–45.3)
MCH RBC QN AUTO: 29.3 PG (ref 26.6–33)
MCHC RBC AUTO-ENTMCNC: 32.7 G/DL (ref 31.5–35.7)
MCV RBC AUTO: 89.6 FL (ref 79–97)
MONOCYTES # BLD AUTO: 0.62 10*3/MM3 (ref 0.1–0.9)
MONOCYTES NFR BLD AUTO: 9.4 % (ref 5–12)
NEUTROPHILS NFR BLD AUTO: 4.01 10*3/MM3 (ref 1.7–7)
NEUTROPHILS NFR BLD AUTO: 60.9 % (ref 42.7–76)
NITRITE UR QL STRIP: NEGATIVE
PH UR STRIP.AUTO: 6 [PH] (ref 5–8)
PHOSPHATE SERPL-MCNC: 3.6 MG/DL (ref 2.5–4.5)
PLATELET # BLD AUTO: 244 10*3/MM3 (ref 140–450)
PMV BLD AUTO: 10.6 FL (ref 6–12)
POTASSIUM SERPL-SCNC: 4 MMOL/L (ref 3.5–5.2)
PROT ?TM UR-MCNC: 10.8 MG/DL
PROT UR QL STRIP: NEGATIVE
PROT/CREAT UR: 0.1 MG/G{CREAT}
PTH-INTACT SERPL-MCNC: 39.9 PG/ML (ref 15–65)
RBC # BLD AUTO: 4.33 10*6/MM3 (ref 3.77–5.28)
RBC # UR STRIP: ABNORMAL /HPF
REF LAB TEST METHOD: ABNORMAL
SODIUM SERPL-SCNC: 137 MMOL/L (ref 136–145)
SP GR UR STRIP: 1.01 (ref 1–1.03)
SQUAMOUS #/AREA URNS HPF: ABNORMAL /HPF
UROBILINOGEN UR QL STRIP: ABNORMAL
WBC # UR STRIP: ABNORMAL /HPF
WBC NRBC COR # BLD AUTO: 6.58 10*3/MM3 (ref 3.4–10.8)

## 2024-10-31 PROCEDURE — 85025 COMPLETE CBC W/AUTO DIFF WBC: CPT

## 2024-10-31 PROCEDURE — 84156 ASSAY OF PROTEIN URINE: CPT

## 2024-10-31 PROCEDURE — 36415 COLL VENOUS BLD VENIPUNCTURE: CPT

## 2024-10-31 PROCEDURE — 82570 ASSAY OF URINE CREATININE: CPT

## 2024-10-31 PROCEDURE — 80069 RENAL FUNCTION PANEL: CPT

## 2024-10-31 PROCEDURE — 81001 URINALYSIS AUTO W/SCOPE: CPT

## 2024-10-31 PROCEDURE — 83970 ASSAY OF PARATHORMONE: CPT

## 2024-11-22 DIAGNOSIS — I25.10 CORONARY ARTERY DISEASE INVOLVING NATIVE CORONARY ARTERY OF NATIVE HEART WITHOUT ANGINA PECTORIS: ICD-10-CM

## 2024-11-22 RX ORDER — ISOSORBIDE MONONITRATE 30 MG/1
30 TABLET, EXTENDED RELEASE ORAL DAILY
Qty: 90 TABLET | Refills: 0 | Status: SHIPPED | OUTPATIENT
Start: 2024-11-22

## 2024-11-25 RX ORDER — HYDROCHLOROTHIAZIDE 25 MG/1
25 TABLET ORAL NIGHTLY
Qty: 90 TABLET | Refills: 0 | Status: SHIPPED | OUTPATIENT
Start: 2024-11-25

## 2024-12-02 DIAGNOSIS — E78.49 OTHER HYPERLIPIDEMIA: ICD-10-CM

## 2024-12-02 RX ORDER — ROSUVASTATIN CALCIUM 20 MG/1
20 TABLET, COATED ORAL DAILY
Qty: 90 TABLET | Refills: 0 | Status: SHIPPED | OUTPATIENT
Start: 2024-12-02

## 2025-01-13 ENCOUNTER — OFFICE VISIT (OUTPATIENT)
Dept: FAMILY MEDICINE CLINIC | Age: 70
End: 2025-01-13
Payer: MEDICARE

## 2025-01-13 ENCOUNTER — LAB (OUTPATIENT)
Dept: LAB | Facility: HOSPITAL | Age: 70
End: 2025-01-13
Payer: MEDICARE

## 2025-01-13 ENCOUNTER — TELEPHONE (OUTPATIENT)
Dept: FAMILY MEDICINE CLINIC | Age: 70
End: 2025-01-13

## 2025-01-13 VITALS
BODY MASS INDEX: 33.02 KG/M2 | OXYGEN SATURATION: 97 % | HEIGHT: 64 IN | SYSTOLIC BLOOD PRESSURE: 107 MMHG | DIASTOLIC BLOOD PRESSURE: 69 MMHG | WEIGHT: 193.4 LBS | TEMPERATURE: 97.8 F | HEART RATE: 73 BPM

## 2025-01-13 DIAGNOSIS — R30.0 DYSURIA: ICD-10-CM

## 2025-01-13 DIAGNOSIS — Z00.00 ROUTINE GENERAL MEDICAL EXAMINATION AT A HEALTH CARE FACILITY: ICD-10-CM

## 2025-01-13 DIAGNOSIS — R82.71 BACTERIA IN URINE: ICD-10-CM

## 2025-01-13 DIAGNOSIS — I10 ESSENTIAL HYPERTENSION: ICD-10-CM

## 2025-01-13 DIAGNOSIS — R53.83 OTHER FATIGUE: ICD-10-CM

## 2025-01-13 DIAGNOSIS — Z12.31 SCREENING MAMMOGRAM FOR BREAST CANCER: Primary | ICD-10-CM

## 2025-01-13 DIAGNOSIS — E11.8 TYPE 2 DIABETES MELLITUS WITH COMPLICATION, WITHOUT LONG-TERM CURRENT USE OF INSULIN: ICD-10-CM

## 2025-01-13 DIAGNOSIS — E78.49 OTHER HYPERLIPIDEMIA: ICD-10-CM

## 2025-01-13 DIAGNOSIS — Z78.0 POSTMENOPAUSAL: ICD-10-CM

## 2025-01-13 DIAGNOSIS — R30.0 DYSURIA: Primary | ICD-10-CM

## 2025-01-13 PROBLEM — Z86.16 HISTORY OF COVID-19: Status: RESOLVED | Noted: 2022-06-08 | Resolved: 2025-01-13

## 2025-01-13 LAB
ALBUMIN SERPL-MCNC: 4.2 G/DL (ref 3.5–5.2)
ALBUMIN/GLOB SERPL: 1.1 G/DL
ALP SERPL-CCNC: 76 U/L (ref 39–117)
ALT SERPL W P-5'-P-CCNC: 19 U/L (ref 1–33)
ANION GAP SERPL CALCULATED.3IONS-SCNC: 11.4 MMOL/L (ref 5–15)
AST SERPL-CCNC: 23 U/L (ref 1–32)
BACTERIA UR QL AUTO: ABNORMAL /HPF
BASOPHILS # BLD AUTO: 0.05 10*3/MM3 (ref 0–0.2)
BASOPHILS NFR BLD AUTO: 0.6 % (ref 0–1.5)
BILIRUB SERPL-MCNC: 0.6 MG/DL (ref 0–1.2)
BILIRUB UR QL STRIP: NEGATIVE
BUN SERPL-MCNC: 27 MG/DL (ref 8–23)
BUN/CREAT SERPL: 17.3 (ref 7–25)
CALCIUM SPEC-SCNC: 10 MG/DL (ref 8.6–10.5)
CHLORIDE SERPL-SCNC: 101 MMOL/L (ref 98–107)
CHOLEST SERPL-MCNC: 114 MG/DL (ref 0–200)
CLARITY UR: CLEAR
CO2 SERPL-SCNC: 23.6 MMOL/L (ref 22–29)
COLOR UR: YELLOW
CREAT SERPL-MCNC: 1.56 MG/DL (ref 0.57–1)
DEPRECATED RDW RBC AUTO: 46 FL (ref 37–54)
EGFRCR SERPLBLD CKD-EPI 2021: 35.6 ML/MIN/1.73
EOSINOPHIL # BLD AUTO: 0.23 10*3/MM3 (ref 0–0.4)
EOSINOPHIL NFR BLD AUTO: 2.7 % (ref 0.3–6.2)
ERYTHROCYTE [DISTWIDTH] IN BLOOD BY AUTOMATED COUNT: 13.6 % (ref 12.3–15.4)
GLOBULIN UR ELPH-MCNC: 3.7 GM/DL
GLUCOSE SERPL-MCNC: 132 MG/DL (ref 65–99)
GLUCOSE UR STRIP-MCNC: NEGATIVE MG/DL
HBA1C MFR BLD: 6.2 % (ref 4.8–5.6)
HCT VFR BLD AUTO: 39.2 % (ref 34–46.6)
HDLC SERPL-MCNC: 33 MG/DL (ref 40–60)
HGB BLD-MCNC: 12.9 G/DL (ref 12–15.9)
HGB UR QL STRIP.AUTO: ABNORMAL
IMM GRANULOCYTES # BLD AUTO: 0.05 10*3/MM3 (ref 0–0.05)
IMM GRANULOCYTES NFR BLD AUTO: 0.6 % (ref 0–0.5)
KETONES UR QL STRIP: NEGATIVE
LDLC SERPL CALC-MCNC: 55 MG/DL (ref 0–100)
LDLC/HDLC SERPL: 1.55 {RATIO}
LEUKOCYTE ESTERASE UR QL STRIP.AUTO: NEGATIVE
LYMPHOCYTES # BLD AUTO: 1.73 10*3/MM3 (ref 0.7–3.1)
LYMPHOCYTES NFR BLD AUTO: 20 % (ref 19.6–45.3)
MCH RBC QN AUTO: 29.8 PG (ref 26.6–33)
MCHC RBC AUTO-ENTMCNC: 32.9 G/DL (ref 31.5–35.7)
MCV RBC AUTO: 90.5 FL (ref 79–97)
MONOCYTES # BLD AUTO: 0.73 10*3/MM3 (ref 0.1–0.9)
MONOCYTES NFR BLD AUTO: 8.4 % (ref 5–12)
NEUTROPHILS NFR BLD AUTO: 5.88 10*3/MM3 (ref 1.7–7)
NEUTROPHILS NFR BLD AUTO: 67.7 % (ref 42.7–76)
NITRITE UR QL STRIP: NEGATIVE
PH UR STRIP.AUTO: 6 [PH] (ref 5–8)
PLATELET # BLD AUTO: 334 10*3/MM3 (ref 140–450)
PMV BLD AUTO: 10.2 FL (ref 6–12)
POTASSIUM SERPL-SCNC: 4.1 MMOL/L (ref 3.5–5.2)
PROT SERPL-MCNC: 7.9 G/DL (ref 6–8.5)
PROT UR QL STRIP: NEGATIVE
RBC # BLD AUTO: 4.33 10*6/MM3 (ref 3.77–5.28)
RBC # UR STRIP: ABNORMAL /HPF
REF LAB TEST METHOD: ABNORMAL
SODIUM SERPL-SCNC: 136 MMOL/L (ref 136–145)
SP GR UR STRIP: 1.01 (ref 1–1.03)
SQUAMOUS #/AREA URNS HPF: ABNORMAL /HPF
TRIGL SERPL-MCNC: 150 MG/DL (ref 0–150)
TSH SERPL DL<=0.05 MIU/L-ACNC: 2.1 UIU/ML (ref 0.27–4.2)
UROBILINOGEN UR QL STRIP: ABNORMAL
VLDLC SERPL-MCNC: 26 MG/DL (ref 5–40)
WBC # UR STRIP: ABNORMAL /HPF
WBC NRBC COR # BLD AUTO: 8.67 10*3/MM3 (ref 3.4–10.8)

## 2025-01-13 PROCEDURE — 81001 URINALYSIS AUTO W/SCOPE: CPT | Performed by: NURSE PRACTITIONER

## 2025-01-13 PROCEDURE — G0439 PPPS, SUBSEQ VISIT: HCPCS | Performed by: NURSE PRACTITIONER

## 2025-01-13 PROCEDURE — 80061 LIPID PANEL: CPT

## 2025-01-13 PROCEDURE — 3074F SYST BP LT 130 MM HG: CPT | Performed by: NURSE PRACTITIONER

## 2025-01-13 PROCEDURE — 83036 HEMOGLOBIN GLYCOSYLATED A1C: CPT

## 2025-01-13 PROCEDURE — 80053 COMPREHEN METABOLIC PANEL: CPT

## 2025-01-13 PROCEDURE — 1170F FXNL STATUS ASSESSED: CPT | Performed by: NURSE PRACTITIONER

## 2025-01-13 PROCEDURE — 1126F AMNT PAIN NOTED NONE PRSNT: CPT | Performed by: NURSE PRACTITIONER

## 2025-01-13 PROCEDURE — 85025 COMPLETE CBC W/AUTO DIFF WBC: CPT

## 2025-01-13 PROCEDURE — 84443 ASSAY THYROID STIM HORMONE: CPT | Performed by: NURSE PRACTITIONER

## 2025-01-13 PROCEDURE — 87086 URINE CULTURE/COLONY COUNT: CPT | Performed by: NURSE PRACTITIONER

## 2025-01-13 PROCEDURE — 36415 COLL VENOUS BLD VENIPUNCTURE: CPT

## 2025-01-13 PROCEDURE — 3078F DIAST BP <80 MM HG: CPT | Performed by: NURSE PRACTITIONER

## 2025-01-13 NOTE — PROGRESS NOTES
Subjective   The ABCs of the Annual Wellness Visit  Medicare Wellness Visit      Jesse Espinal is a 69 y.o. patient who presents for a Medicare Wellness Visit.    Medicare wellness HPI  Exercises regularly:walks 10 minutes a day   Eats healthy: not really   Last mammogram:1-23-24  Last DEXA:12-21-22, normal   Last pap smear: n/a  BSE:yes   Wears seatbelts:yes   Living will:has one, advised to bring in a copy   Optometrist:sees Dr Nicholas   Dentist:Dr Wall   Tobacco:none   Alcohol intake:occ  Drugs:none   Falls:none  Colonoscopy: 1-  Immunizations:she has had pneum 20 vaccine, got flu, shingles vaccines at [facility]      The following portions of the patient's history were reviewed and   updated as appropriate: allergies, current medications, past family history, past medical history, past social history, past surgical history, and problem list.    Compared to one year ago, the patient's physical   health is the same.  Compared to one year ago, the patient's mental   health is the same.    Recent Hospitalizations:  She was not admitted to the hospital during the last year.     Current Medical Providers:  Patient Care Team:  Quyen Farah APRN as PCP - General (Family Medicine)    Outpatient Medications Prior to Visit   Medication Sig Dispense Refill    acetaminophen (TYLENOL) 325 MG tablet Take 1 tablet by mouth Every 6 (Six) Hours As Needed.      amLODIPine (NORVASC) 5 MG tablet Take 1 tablet by mouth Daily. 90 tablet 0    Blood Glucose Monitoring Suppl device 1 each 2 (Two) Times a Day. Pt to use to check blood sugar twice daily for E11.9 1 each 0    glucose blood (OneTouch Ultra) test strip 1 each by Other route See Admin Instructions. Use once to twice daily as needed 100 each 3    glucose blood test strip Use to check blood sugar twice daily for E11.9 200 each 12    hydroCHLOROthiazide 25 MG tablet TAKE 1 TABLET BY MOUTH EVERY NIGHT. 90 tablet 0    isosorbide mononitrate (IMDUR) 30 MG 24  hr tablet TAKE 1 TABLET BY MOUTH DAILY. 90 tablet 0    Lancets misc 1 each 2 (Two) Times a Day. Use to check blood sugar twice daily for E11.9 200 each 3    losartan (COZAAR) 100 MG tablet TAKE 1 TABLET BY MOUTH DAILY. 90 tablet 0    MAGNESIUM CITRATE PO Take  by mouth.      metFORMIN ER (GLUCOPHAGE-XR) 500 MG 24 hr tablet Take 1 tablet by mouth Daily With Breakfast. 90 tablet 1    metoprolol succinate XL (TOPROL-XL) 100 MG 24 hr tablet Take 1 tablet by mouth Daily. 90 tablet 1    rosuvastatin (CRESTOR) 20 MG tablet TAKE 1 TABLET BY MOUTH DAILY. 90 tablet 0    ticagrelor (Brilinta) 60 MG tablet tablet TAKE 1 TABLET BY MOUTH EVERY 12 HOURS 60 tablet 3    docusate sodium 100 MG capsule Take 1 capsule by mouth. (Patient not taking: Reported on 1/13/2025)       No facility-administered medications prior to visit.     No opioid medication identified on active medication list. I have reviewed chart for other potential  high risk medication/s and harmful drug interactions in the elderly.      Aspirin is not on active medication list.  Aspirin use is not indicated based on review of current medical condition/s. Risk of harm outweighs potential benefits.  .    Patient Active Problem List   Diagnosis    Coronary artery disease involving native coronary artery of native heart without angina pectoris    History of coronary artery stent placement    Other hyperlipidemia    Type 2 diabetes mellitus with complication, without long-term current use of insulin    Class 2 severe obesity due to excess calories with serious comorbidity and body mass index (BMI) of 36.0 to 36.9 in adult    Heart murmur    Stage 3a chronic kidney disease (CKD)    Swelling of both lower extremities    Immunization due    Essential hypertension    Routine general medical examination at a health care facility    Postmenopausal    Screening mammogram for breast cancer    Colon cancer screening    Hair thinning    Family history of malignant neoplasm of colon  "   Hordeolum externum of right lower eyelid    Other fatigue    Dysuria     Advance Care Planning Advance Directive is not on file.  ACP discussion was held with the patient during this visit. Patient has an advance directive (not in EMR), copy requested.            Objective   Vitals:    25 0832 25 0906   BP: (!) 84/62 107/69   BP Location: Left arm Left arm   Patient Position: Sitting Sitting   Cuff Size: Adult Large Adult   Pulse: 72 73   Temp: 97.8 °F (36.6 °C)    TempSrc: Oral    SpO2: 97%    Weight: 87.7 kg (193 lb 6.4 oz)    Height: 162.6 cm (64\")        Estimated body mass index is 33.2 kg/m² as calculated from the following:    Height as of this encounter: 162.6 cm (64\").    Weight as of this encounter: 87.7 kg (193 lb 6.4 oz).    BMI is >= 30 and <35. (Class 1 Obesity). The following options were offered after discussion;: exercise counseling/recommendations and nutrition counseling/recommendations           Does the patient have evidence of cognitive impairment? No                                                                                                Health  Risk Assessment    Smoking Status:  Social History     Tobacco Use   Smoking Status Former    Current packs/day: 0.00    Average packs/day: 2.5 packs/day for 40.0 years (100.0 ttl pk-yrs)    Types: Cigarettes    Start date:     Quit date:     Years since quittin.0   Smokeless Tobacco Never     Alcohol Consumption:  Social History     Substance and Sexual Activity   Alcohol Use Yes    Alcohol/week: 1.0 standard drink of alcohol    Types: 1 Glasses of wine per week    Comment: occ       Fall Risk Screen  STEADI Fall Risk Assessment was completed, and patient is at LOW risk for falls.Assessment completed on:2025    Depression Screening   Little interest or pleasure in doing things? Not at all   Feeling down, depressed, or hopeless? Not at all   PHQ-2 Total Score 0      Health Habits and Functional and Cognitive " Screenin/13/2025     8:40 AM   Functional & Cognitive Status   Do you have difficulty preparing food and eating? No   Do you have difficulty bathing yourself, getting dressed or grooming yourself? No   Do you have difficulty using the toilet? No   Do you have difficulty moving around from place to place? No   Do you have trouble with steps or getting out of a bed or a chair? No   Current Diet Unhealthy Diet   Dental Exam Up to date   Eye Exam Up to date   Exercise (times per week) 0 times per week   Current Exercises Include No Regular Exercise   Do you need help using the phone?  No   Are you deaf or do you have serious difficulty hearing?  Yes   Do you need help to go to places out of walking distance? No   Do you need help shopping? No   Do you need help preparing meals?  No   Do you need help with housework?  No   Do you need help with laundry? No   Do you need help taking your medications? No   Do you need help managing money? No   Do you ever drive or ride in a car without wearing a seat belt? No   Have you felt unusual stress, anger or loneliness in the last month? No   Who do you live with? Alone   If you need help, do you have trouble finding someone available to you? No   Have you been bothered in the last four weeks by sexual problems? No   Do you have difficulty concentrating, remembering or making decisions? No           Age-appropriate Screening Schedule:  Refer to the list below for future screening recommendations based on patient's age, sex and/or medical conditions. Orders for these recommended tests are listed in the plan section. The patient has been provided with a written plan.    Health Maintenance List  Health Maintenance   Topic Date Due    TDAP/TD VACCINES (1 - Tdap) Never done    ZOSTER VACCINE (2 of 3) 11/15/2008    COVID-19 Vaccine ( season) 2024    DXA SCAN  2024    DIABETIC FOOT EXAM  2025    HEMOGLOBIN A1C  2025    BMI FOLLOWUP  2025     DIABETIC EYE EXAM  02/21/2025    LIPID PANEL  07/02/2025    ANNUAL WELLNESS VISIT  01/13/2026    MAMMOGRAM  01/23/2026    COLORECTAL CANCER SCREENING  01/30/2027    HEPATITIS C SCREENING  Completed    INFLUENZA VACCINE  Completed    Pneumococcal Vaccine 65+  Completed    PAP SMEAR  Discontinued    URINE MICROALBUMIN  Discontinued                                                                                                                                                Gait and Balance Evaluation: Normal  CMS Preventative Services Quick Reference  Risk Factors Identified During Encounter  Immunizations Discussed/Encouraged: Tdap and COVID19    The above risks/problems have been discussed with the patient.  Pertinent information has been shared with the patient in the After Visit Summary.  An After Visit Summary and PPPS were made available to the patient.    Follow Up:   Next Medicare Wellness visit to be scheduled in 1 year.          Additional E&M Note during same encounter follows:  Patient has multiple medical problems which are significant and separately identifiable that require additional work above and beyond the Medicare Wellness Visit.      Chief Complaint  Medicare Wellness-subsequent    Jesse Espinal is a 69 y.o. female who presents to [facility]     Diabetes:  Current medication: metformin   Tolerating medication: Yes  Last eye exam: 2-21-24 sees Dr Nicholas  Last foot exam: > 1 year ago   At home BS ranges: 140  Lab Results       Component                Value               Date                       HGBA1C                   6.20 (H)            07/02/2024                Hyperlipidemia  Current medication: crestor  Tolerating medication: Yes  Needs Refill: no    Lab Results       Component                Value               Date                       CHOL                     111                 07/02/2024                 CHLPL                    131                  2021                 TRIG                     121                 2024                 HDL                      35 (L)              2024                 LDL                      54                  2024                Hypertension:  Sees cardiology   Current medication: losartan, HCTZ, Toprol  Tolerating Medication: Yes  Checking BP at home and it is: 115-120's over 80's   Needs refills: not yet   Labs:  Lab Results       Component                Value               Date                       GLUCOSE                  120 (H)             10/31/2024                 BUN                      24 (H)              10/31/2024                 CREATININE               1.40 (H)            10/31/2024                 EGFRIFNONA               46 (L)              2021                 BCR                      17.1                10/31/2024                 K                        4.0                 10/31/2024                 CO2                      22.9                10/31/2024                 CALCIUM                  10.7 (H)            10/31/2024                 PROTENTOTREF             7.6                 2022                 ALBUMIN                  4.3                 10/31/2024                 LABIL2                   1.0                 2022                 AST                      20                  2024                 ALT                      21                  2024              PAST MEDICAL HISTORY changes since 2024:      COVID + , mild ( was in hospital)         GYNECOLOGICAL HISTORY:             CURRENT MEDICAL PROVIDERS:    Cardiologist: Dr Dailey     Nephrology: Mariama/ Luisa       PREVENTIVE HEALTH MAINTENANCE         Colonoscopy 24, colon polyps, next due     Hepatitis C Medicare Screening: was last done ; negative         Surgical History:         Appendectomy    Cholecystectomy    Hysterectomy     Coronary Artery  "Stent Placement: 13 and 14 LAD; Procedures: colonoscopy 13, diverticulosis and internal hemorrhoids heart cath , severe 3 vessel CAD     Procedures: cystoscopy 16         Family History:        Father:  at age 45; Cause of death was stroke;  liver disease     Mother:  at age 75; Cause of death was colon;  Coronary Artery Disease; Hypertension;  Breast Cancer; Colon Cancer;  Type 2 Diabetes     Brother(s): 4 brother(s) total; 2 ;  Coronary Artery Disease;  Lung Cancer     Sister(s): 3 sister(s) total; 2 ;  Coronary Artery Disease; Hyperlipidemia; Neurological/Genetic Cerebrovascular Accident (  70 );  pneumonia     Son(s): 3 son(s) total;  Hypertension; Hyperlipidemia         Social History:        Occupation:retired  [employer]       Marital Status:  2023     Children: 3 children                  Objective   Vital Signs:   Vitals:    25 0832 25 0906   BP: (!) 84/62 107/69   BP Location: Left arm Left arm   Patient Position: Sitting Sitting   Cuff Size: Adult Large Adult   Pulse: 72 73   Temp: 97.8 °F (36.6 °C)    TempSrc: Oral    SpO2: 97%    Weight: 87.7 kg (193 lb 6.4 oz)    Height: 162.6 cm (64\")      Body mass index is 33.2 kg/m².    Wt Readings from Last 3 Encounters:   25 87.7 kg (193 lb 6.4 oz)   24 87.5 kg (193 lb)   24 87.5 kg (193 lb)     BP Readings from Last 3 Encounters:   25 107/69   24 130/88   24 128/74       Review of Systems   Constitutional:  Positive for fatigue (few weeks/does use her c pap). Negative for activity change, appetite change, chills and fever.   HENT:  Negative for congestion and hearing loss.         Sinus pain on left frontal    Eyes:  Negative for visual disturbance.   Respiratory:  Negative for cough, shortness of breath and wheezing.    Cardiovascular:  Negative for chest pain, palpitations and leg swelling.   Gastrointestinal:  Negative for abdominal pain, blood " in stool, constipation, diarrhea, nausea and vomiting.   Genitourinary:  Positive for dysuria (occ). Negative for hematuria.   Musculoskeletal:  Negative for arthralgias and myalgias.   Skin:  Negative for rash.   Neurological:  Positive for headaches (more frequently). Negative for dizziness, weakness and numbness.   Psychiatric/Behavioral:  Negative for confusion, sleep disturbance and suicidal ideas.         Physical Exam  Vitals reviewed.   Constitutional:       General: She is not in acute distress.     Appearance: Normal appearance. She is well-developed.   HENT:      Head: Normocephalic. Hair is normal.      Right Ear: Hearing, tympanic membrane, ear canal and external ear normal. No decreased hearing noted. No drainage.      Left Ear: Hearing, tympanic membrane, ear canal and external ear normal. No decreased hearing noted.      Nose: Nose normal. No nasal deformity.      Mouth/Throat:      Mouth: Mucous membranes are moist.   Eyes:      General: Lids are normal.      Extraocular Movements: Extraocular movements intact.      Conjunctiva/sclera: Conjunctivae normal.      Pupils: Pupils are equal, round, and reactive to light.   Neck:      Thyroid: No thyromegaly.      Vascular: No carotid bruit or JVD.   Cardiovascular:      Rate and Rhythm: Normal rate and regular rhythm.      Pulses: Normal pulses.           Posterior tibial pulses are 2+ on the right side and 2+ on the left side.      Heart sounds: Normal heart sounds. No murmur heard.     No friction rub. No gallop.   Pulmonary:      Effort: Pulmonary effort is normal. No respiratory distress.      Breath sounds: Normal breath sounds. No wheezing.   Chest:   Breasts:     Right: Normal. No mass, nipple discharge or skin change.      Left: Normal. No mass, nipple discharge or skin change.   Abdominal:      General: Bowel sounds are normal.      Palpations: Abdomen is soft. There is no mass.      Tenderness: There is no abdominal tenderness.    Musculoskeletal:         General: No swelling, tenderness or deformity. Normal range of motion.      Cervical back: Normal range of motion and neck supple.   Feet:      Right foot:      Protective Sensation: 3 sites tested.  3 sites sensed.      Skin integrity: Skin integrity normal. No ulcer or blister.      Toenail Condition: Right toenails are normal.      Left foot:      Protective Sensation: 3 sites tested.  3 sites sensed.      Skin integrity: Skin integrity normal. No ulcer or blister.      Toenail Condition: Left toenails are normal.      Comments: Diabetic Foot Exam Performed and Monofilament Test Performed     Lymphadenopathy:      Cervical: No cervical adenopathy.      Upper Body:      Right upper body: No axillary adenopathy.      Left upper body: No axillary adenopathy.   Skin:     General: Skin is warm and dry.      Findings: No erythema or rash.   Neurological:      Mental Status: She is alert and oriented to person, place, and time.      Motor: No abnormal muscle tone.      Gait: Gait normal.      Deep Tendon Reflexes: Reflexes are normal and symmetric.   Psychiatric:         Mood and Affect: Mood normal.         Behavior: Behavior normal.         Thought Content: Thought content normal.         Judgment: Judgment normal.         The following data was reviewed by SPIKE Jj on 01/13/2025        Assessment & Plan   Diagnoses and all orders for this visit:    1. Screening mammogram for breast cancer (Primary)  Assessment & Plan:  Scheduling mammogram when due, continue monthly BSE     Orders:  -     Mammo Screening Digital Tomosynthesis Bilateral With CAD; Future    2. Essential hypertension  Assessment & Plan:  Hypertension is stable. Continue to monitor BP at home. Continue current meds. Continue to modify diet and lifestyle. She is fasting today, checking labs.         3. Other hyperlipidemia  Assessment & Plan:   Continue current medication and efforts with diet and exercise.          4. Routine general medical examination at a health care facility  Assessment & Plan:  Advise regular exercise, healthy eating, always wear seat belts. Living will discussed, to bring in a copy, fall prevention discussed.  Immunizations discussed, send for vaccines done through [facility], richa updating on covid vaccine.   To continue yearly optometry and dental exams.        Orders:  -     Comprehensive Metabolic Panel; Future  -     Lipid Panel; Future  -     TSH Rfx On Abnormal To Free T4  -     CBC w AUTO Differential; Future    5. Type 2 diabetes mellitus with complication, without long-term current use of insulin  Assessment & Plan:  To work on diet, regular exercise, monitor sugars, check feet daily, set up her optometrist appt.      Orders:  -     Comprehensive Metabolic Panel; Future  -     Lipid Panel; Future  -     Hemoglobin A1c; Future    6. Postmenopausal  Assessment & Plan:  Scheduling DEXA     Orders:  -     DEXA Bone Density Axial; Future    7. Other fatigue  Assessment & Plan:  Checking some labs    Orders:  -     TSH Rfx On Abnormal To Free T4  -     CBC w AUTO Differential; Future    8. Dysuria  Assessment & Plan:  Checking a u/a    Orders:  -     Urinalysis With Culture If Indicated - Urine, Clean Catch  -     Urinalysis, Microscopic Only - Urine, Clean Catch          BMI is >= 30 and <35. (Class 1 Obesity). The following options were offered after discussion;: exercise counseling/recommendations and nutrition counseling/recommendations       FOLLOW UP  Return if symptoms worsen or fail to improve, for followup pending lab results.  Patient was given instructions and counseling regarding her condition or for health maintenance advice. Please see specific information pulled into the AVS if appropriate.     Quyen Farah, APRN  01/13/25  09:36 EST

## 2025-01-13 NOTE — ASSESSMENT & PLAN NOTE
Advise regular exercise, healthy eating, always wear seat belts. Living will discussed, to bring in a copy, fall prevention discussed.  Immunizations discussed, send for vaccines done through [facility], declines updating on covid vaccine.   To continue yearly optometry and dental exams.

## 2025-01-13 NOTE — ASSESSMENT & PLAN NOTE
Hypertension is stable. Continue to monitor BP at home. Continue current meds. Continue to modify diet and lifestyle. She is fasting today, checking labs.

## 2025-01-13 NOTE — TELEPHONE ENCOUNTER
----- Message from Quyen Farah sent at 1/13/2025  8:54 AM EST -----  Need her [facility] vaccines, seh thinks she had three there, flu, shingles and another r

## 2025-01-14 LAB — BACTERIA SPEC AEROBE CULT: NO GROWTH

## 2025-01-14 NOTE — TELEPHONE ENCOUNTER
Neeraj guo said she got the flu vaccine 09/27/2024, Shringrex 01/12/2024 and 03/14/2024 and the RSV 02/01/2024.  Added to immunization list.

## 2025-01-27 DIAGNOSIS — I10 ESSENTIAL HYPERTENSION: ICD-10-CM

## 2025-01-27 RX ORDER — METOPROLOL SUCCINATE 100 MG/1
100 TABLET, EXTENDED RELEASE ORAL DAILY
Qty: 90 TABLET | Refills: 1 | Status: SHIPPED | OUTPATIENT
Start: 2025-01-27

## 2025-01-27 RX ORDER — LOSARTAN POTASSIUM 100 MG/1
100 TABLET ORAL DAILY
Qty: 90 TABLET | Refills: 1 | Status: SHIPPED | OUTPATIENT
Start: 2025-01-27

## 2025-02-17 DIAGNOSIS — I10 ESSENTIAL HYPERTENSION: ICD-10-CM

## 2025-02-17 DIAGNOSIS — E11.8 TYPE 2 DIABETES MELLITUS WITH COMPLICATION, WITHOUT LONG-TERM CURRENT USE OF INSULIN: ICD-10-CM

## 2025-02-17 DIAGNOSIS — I25.10 CORONARY ARTERY DISEASE INVOLVING NATIVE CORONARY ARTERY OF NATIVE HEART WITHOUT ANGINA PECTORIS: ICD-10-CM

## 2025-02-17 RX ORDER — HYDROCHLOROTHIAZIDE 25 MG/1
25 TABLET ORAL NIGHTLY
Qty: 90 TABLET | Refills: 0 | Status: SHIPPED | OUTPATIENT
Start: 2025-02-17

## 2025-02-17 RX ORDER — AMLODIPINE BESYLATE 5 MG/1
5 TABLET ORAL DAILY
Qty: 90 TABLET | Refills: 0 | Status: SHIPPED | OUTPATIENT
Start: 2025-02-17

## 2025-02-18 RX ORDER — METFORMIN HYDROCHLORIDE 500 MG/1
500 TABLET, EXTENDED RELEASE ORAL
Qty: 90 TABLET | Refills: 0 | Status: SHIPPED | OUTPATIENT
Start: 2025-02-18

## 2025-02-18 RX ORDER — ISOSORBIDE MONONITRATE 30 MG/1
30 TABLET, EXTENDED RELEASE ORAL DAILY
Qty: 90 TABLET | Refills: 0 | Status: SHIPPED | OUTPATIENT
Start: 2025-02-18

## 2025-03-04 RX ORDER — TICAGRELOR 60 MG/1
TABLET ORAL
Qty: 180 TABLET | Refills: 1 | Status: SHIPPED | OUTPATIENT
Start: 2025-03-04

## 2025-03-13 DIAGNOSIS — E78.49 OTHER HYPERLIPIDEMIA: ICD-10-CM

## 2025-03-13 RX ORDER — ROSUVASTATIN CALCIUM 20 MG/1
20 TABLET, COATED ORAL DAILY
Qty: 90 TABLET | Refills: 0 | Status: SHIPPED | OUTPATIENT
Start: 2025-03-13

## 2025-03-20 ENCOUNTER — HOSPITAL ENCOUNTER (OUTPATIENT)
Dept: MAMMOGRAPHY | Facility: HOSPITAL | Age: 70
Discharge: HOME | End: 2025-03-20
Payer: MEDICARE

## 2025-03-20 ENCOUNTER — HOSPITAL ENCOUNTER (OUTPATIENT)
Dept: BONE DENSITY | Facility: HOSPITAL | Age: 70
Discharge: HOME | End: 2025-03-20
Payer: MEDICARE

## 2025-03-20 ENCOUNTER — RESULTS FOLLOW-UP (OUTPATIENT)
Dept: MAMMOGRAPHY | Facility: HOSPITAL | Age: 70
End: 2025-03-20
Payer: MEDICARE

## 2025-03-20 DIAGNOSIS — Z78.0 POSTMENOPAUSAL: ICD-10-CM

## 2025-03-20 DIAGNOSIS — Z12.31 SCREENING MAMMOGRAM FOR BREAST CANCER: ICD-10-CM

## 2025-03-20 PROCEDURE — 77063 BREAST TOMOSYNTHESIS BI: CPT

## 2025-03-20 PROCEDURE — 77080 DXA BONE DENSITY AXIAL: CPT

## 2025-03-20 PROCEDURE — 77067 SCR MAMMO BI INCL CAD: CPT

## 2025-03-20 NOTE — LETTER
Jesse Espinal  [address] [place] [id number]    March 25, 2025     Jesse,     Your mammogram was normal.      Below are the results from your recent visit:    Resulted Orders   Mammo Screening Digital Tomosynthesis Bilateral With CAD    Narrative    MAMMO SCREENING DIGITAL TOMOSYNTHESIS BILATERAL W CAD-     Date of Exam: 3/20/2025 9:02 AM     Indication: Screening.     Comparison: 1/23/2024, 12/21/2022, 2/7/2020     Technique: Routine screening mammogram images were obtained per  protocol.  Tomosynthesis was utilized.  These mammographic images were  interpreted with the assistance of a computer aided detection system.     Breast Density: The breast(s) are heterogenously dense, which may  obscure small masses.     Findings:    No suspicious masses, suspicious microcalcifications, or architectural  distortions are identified.       Impression    No mammographic evidence of malignancy.  Recommend annual screening  mammography.     BI-RADS ASSESSMENT: BI-RADS 1. Negative.     Note:  It has been reported that there is approximately a 15% false  negative rate in mammography.  Therefore, management of a palpable  abnormality should not be deferred because of a negative mammogram.     3/20/2025 11:46 AM by Eric Kebede MD on Workstation: [id number]              If you have any questions or concerns, please don't hesitate to call.         Sincerely,        SPIKE Jj

## 2025-04-22 ENCOUNTER — TRANSCRIBE ORDERS (OUTPATIENT)
Dept: ADMINISTRATIVE | Facility: HOSPITAL | Age: 70
End: 2025-04-22
Payer: MEDICARE

## 2025-04-22 DIAGNOSIS — N18.31 CHRONIC KIDNEY DISEASE (CKD) STAGE G3A/A1, MODERATELY DECREASED GLOMERULAR FILTRATION RATE (GFR) BETWEEN 45-59 ML/MIN/1.73 SQUARE METER AND ALBUMINURIA CREATININE RATIO LESS THAN 30 MG/G (CMS/H*: Primary | ICD-10-CM

## 2025-04-23 ENCOUNTER — LAB (OUTPATIENT)
Dept: LAB | Facility: HOSPITAL | Age: 70
End: 2025-04-23
Payer: MEDICARE

## 2025-04-23 DIAGNOSIS — N18.31 CHRONIC KIDNEY DISEASE (CKD) STAGE G3A/A1, MODERATELY DECREASED GLOMERULAR FILTRATION RATE (GFR) BETWEEN 45-59 ML/MIN/1.73 SQUARE METER AND ALBUMINURIA CREATININE RATIO LESS THAN 30 MG/G (CMS/H*: ICD-10-CM

## 2025-04-23 LAB
ANION GAP SERPL CALCULATED.3IONS-SCNC: 9.9 MMOL/L (ref 5–15)
BACTERIA UR QL AUTO: NORMAL /HPF
BASOPHILS # BLD AUTO: 0.05 10*3/MM3 (ref 0–0.2)
BASOPHILS NFR BLD AUTO: 0.7 % (ref 0–1.5)
BILIRUB UR QL STRIP: NEGATIVE
BUN SERPL-MCNC: 24 MG/DL (ref 8–23)
BUN/CREAT SERPL: 19 (ref 7–25)
CALCIUM SPEC-SCNC: 10.4 MG/DL (ref 8.6–10.5)
CHLORIDE SERPL-SCNC: 103 MMOL/L (ref 98–107)
CLARITY UR: CLEAR
CO2 SERPL-SCNC: 23.1 MMOL/L (ref 22–29)
COLOR UR: YELLOW
CREAT SERPL-MCNC: 1.26 MG/DL (ref 0.57–1)
CREAT UR-MCNC: 71.1 MG/DL
DEPRECATED RDW RBC AUTO: 46.6 FL (ref 37–54)
EGFRCR SERPLBLD CKD-EPI 2021: 45.7 ML/MIN/1.73
EOSINOPHIL # BLD AUTO: 0.31 10*3/MM3 (ref 0–0.4)
EOSINOPHIL NFR BLD AUTO: 4.5 % (ref 0.3–6.2)
ERYTHROCYTE [DISTWIDTH] IN BLOOD BY AUTOMATED COUNT: 13.7 % (ref 12.3–15.4)
GLUCOSE SERPL-MCNC: 132 MG/DL (ref 65–99)
GLUCOSE UR STRIP-MCNC: NEGATIVE MG/DL
HCT VFR BLD AUTO: 40.5 % (ref 34–46.6)
HGB BLD-MCNC: 13.1 G/DL (ref 12–15.9)
HGB UR QL STRIP.AUTO: ABNORMAL
IMM GRANULOCYTES # BLD AUTO: 0.03 10*3/MM3 (ref 0–0.05)
IMM GRANULOCYTES NFR BLD AUTO: 0.4 % (ref 0–0.5)
KETONES UR QL STRIP: NEGATIVE
LEUKOCYTE ESTERASE UR QL STRIP.AUTO: NEGATIVE
LYMPHOCYTES # BLD AUTO: 1.81 10*3/MM3 (ref 0.7–3.1)
LYMPHOCYTES NFR BLD AUTO: 26 % (ref 19.6–45.3)
MCH RBC QN AUTO: 29.3 PG (ref 26.6–33)
MCHC RBC AUTO-ENTMCNC: 32.3 G/DL (ref 31.5–35.7)
MCV RBC AUTO: 90.6 FL (ref 79–97)
MONOCYTES # BLD AUTO: 0.67 10*3/MM3 (ref 0.1–0.9)
MONOCYTES NFR BLD AUTO: 9.6 % (ref 5–12)
NEUTROPHILS NFR BLD AUTO: 4.08 10*3/MM3 (ref 1.7–7)
NEUTROPHILS NFR BLD AUTO: 58.8 % (ref 42.7–76)
NITRITE UR QL STRIP: NEGATIVE
PH UR STRIP.AUTO: 6.5 [PH] (ref 5–8)
PHOSPHATE SERPL-MCNC: 3.4 MG/DL (ref 2.5–4.5)
PLATELET # BLD AUTO: 257 10*3/MM3 (ref 140–450)
PMV BLD AUTO: 10 FL (ref 6–12)
POTASSIUM SERPL-SCNC: 4.3 MMOL/L (ref 3.5–5.2)
PROT ?TM UR-MCNC: 9.6 MG/DL
PROT UR QL STRIP: NEGATIVE
PROT/CREAT UR: 0.14 MG/G{CREAT}
PTH-INTACT SERPL-MCNC: 57 PG/ML (ref 15–65)
RBC # BLD AUTO: 4.47 10*6/MM3 (ref 3.77–5.28)
RBC # UR STRIP: NORMAL /HPF
REF LAB TEST METHOD: NORMAL
SODIUM SERPL-SCNC: 136 MMOL/L (ref 136–145)
SP GR UR STRIP: 1.01 (ref 1–1.03)
SQUAMOUS #/AREA URNS HPF: NORMAL /HPF
UROBILINOGEN UR QL STRIP: ABNORMAL
WBC # UR STRIP: NORMAL /HPF
WBC NRBC COR # BLD AUTO: 6.95 10*3/MM3 (ref 3.4–10.8)

## 2025-04-23 PROCEDURE — 84100 ASSAY OF PHOSPHORUS: CPT

## 2025-04-23 PROCEDURE — 81001 URINALYSIS AUTO W/SCOPE: CPT

## 2025-04-23 PROCEDURE — 85025 COMPLETE CBC W/AUTO DIFF WBC: CPT

## 2025-04-23 PROCEDURE — 36415 COLL VENOUS BLD VENIPUNCTURE: CPT

## 2025-04-23 PROCEDURE — 82570 ASSAY OF URINE CREATININE: CPT

## 2025-04-23 PROCEDURE — 84156 ASSAY OF PROTEIN URINE: CPT

## 2025-04-23 PROCEDURE — 80048 BASIC METABOLIC PNL TOTAL CA: CPT

## 2025-04-23 PROCEDURE — 83970 ASSAY OF PARATHORMONE: CPT

## 2025-05-21 DIAGNOSIS — I25.10 CORONARY ARTERY DISEASE INVOLVING NATIVE CORONARY ARTERY OF NATIVE HEART WITHOUT ANGINA PECTORIS: ICD-10-CM

## 2025-05-21 DIAGNOSIS — E11.8 TYPE 2 DIABETES MELLITUS WITH COMPLICATION, WITHOUT LONG-TERM CURRENT USE OF INSULIN: ICD-10-CM

## 2025-05-21 DIAGNOSIS — I10 ESSENTIAL HYPERTENSION: ICD-10-CM

## 2025-05-21 RX ORDER — AMLODIPINE BESYLATE 5 MG/1
5 TABLET ORAL DAILY
Qty: 90 TABLET | Refills: 0 | Status: SHIPPED | OUTPATIENT
Start: 2025-05-21

## 2025-05-21 RX ORDER — METFORMIN HYDROCHLORIDE 500 MG/1
500 TABLET, EXTENDED RELEASE ORAL
Qty: 90 TABLET | Refills: 0 | Status: SHIPPED | OUTPATIENT
Start: 2025-05-21

## 2025-05-21 RX ORDER — ISOSORBIDE MONONITRATE 30 MG/1
30 TABLET, EXTENDED RELEASE ORAL DAILY
Qty: 90 TABLET | Refills: 0 | Status: SHIPPED | OUTPATIENT
Start: 2025-05-21

## 2025-05-21 RX ORDER — HYDROCHLOROTHIAZIDE 25 MG/1
25 TABLET ORAL NIGHTLY
Qty: 90 TABLET | Refills: 0 | Status: SHIPPED | OUTPATIENT
Start: 2025-05-21

## 2025-06-17 ENCOUNTER — LAB (OUTPATIENT)
Dept: LAB | Facility: HOSPITAL | Age: 70
End: 2025-06-17
Payer: MEDICARE

## 2025-06-17 ENCOUNTER — OFFICE VISIT (OUTPATIENT)
Dept: FAMILY MEDICINE CLINIC | Age: 70
End: 2025-06-17
Payer: MEDICARE

## 2025-06-17 VITALS
BODY MASS INDEX: 33.63 KG/M2 | TEMPERATURE: 97.9 F | OXYGEN SATURATION: 94 % | DIASTOLIC BLOOD PRESSURE: 79 MMHG | HEART RATE: 60 BPM | WEIGHT: 197 LBS | SYSTOLIC BLOOD PRESSURE: 128 MMHG | HEIGHT: 64 IN

## 2025-06-17 DIAGNOSIS — I10 ESSENTIAL HYPERTENSION: ICD-10-CM

## 2025-06-17 DIAGNOSIS — E78.49 OTHER HYPERLIPIDEMIA: ICD-10-CM

## 2025-06-17 DIAGNOSIS — Z78.0 POSTMENOPAUSAL: ICD-10-CM

## 2025-06-17 DIAGNOSIS — E11.8 TYPE 2 DIABETES MELLITUS WITH COMPLICATION, WITHOUT LONG-TERM CURRENT USE OF INSULIN: ICD-10-CM

## 2025-06-17 DIAGNOSIS — I10 ESSENTIAL HYPERTENSION: Primary | ICD-10-CM

## 2025-06-17 LAB
ALBUMIN SERPL-MCNC: 4.5 G/DL (ref 3.5–5.2)
ALBUMIN UR-MCNC: 2 MG/DL
ALBUMIN/GLOB SERPL: 1.2 G/DL
ALP SERPL-CCNC: 73 U/L (ref 39–117)
ALT SERPL W P-5'-P-CCNC: 24 U/L (ref 1–33)
ANION GAP SERPL CALCULATED.3IONS-SCNC: 11.5 MMOL/L (ref 5–15)
AST SERPL-CCNC: 28 U/L (ref 1–32)
BILIRUB SERPL-MCNC: 0.6 MG/DL (ref 0–1.2)
BUN SERPL-MCNC: 18 MG/DL (ref 8–23)
BUN/CREAT SERPL: 12.7 (ref 7–25)
CALCIUM SPEC-SCNC: 10.7 MG/DL (ref 8.6–10.5)
CHLORIDE SERPL-SCNC: 103 MMOL/L (ref 98–107)
CHOLEST SERPL-MCNC: 111 MG/DL (ref 0–200)
CO2 SERPL-SCNC: 22.5 MMOL/L (ref 22–29)
CREAT SERPL-MCNC: 1.42 MG/DL (ref 0.57–1)
CREAT UR-MCNC: 83.8 MG/DL
EGFRCR SERPLBLD CKD-EPI 2021: 39.6 ML/MIN/1.73
GLOBULIN UR ELPH-MCNC: 3.7 GM/DL
GLUCOSE SERPL-MCNC: 125 MG/DL (ref 65–99)
HBA1C MFR BLD: 6.6 % (ref 4.8–5.6)
HDLC SERPL-MCNC: 36 MG/DL (ref 40–60)
LDLC SERPL CALC-MCNC: 52 MG/DL (ref 0–100)
LDLC/HDLC SERPL: 1.38 {RATIO}
MICROALBUMIN/CREAT UR: 23.9 MG/G (ref 0–29)
POTASSIUM SERPL-SCNC: 4.2 MMOL/L (ref 3.5–5.2)
PROT SERPL-MCNC: 8.2 G/DL (ref 6–8.5)
SODIUM SERPL-SCNC: 137 MMOL/L (ref 136–145)
TRIGL SERPL-MCNC: 126 MG/DL (ref 0–150)
VLDLC SERPL-MCNC: 23 MG/DL (ref 5–40)

## 2025-06-17 PROCEDURE — 82570 ASSAY OF URINE CREATININE: CPT

## 2025-06-17 PROCEDURE — 83036 HEMOGLOBIN GLYCOSYLATED A1C: CPT

## 2025-06-17 PROCEDURE — 82043 UR ALBUMIN QUANTITATIVE: CPT

## 2025-06-17 PROCEDURE — 36415 COLL VENOUS BLD VENIPUNCTURE: CPT

## 2025-06-17 PROCEDURE — 99214 OFFICE O/P EST MOD 30 MIN: CPT | Performed by: NURSE PRACTITIONER

## 2025-06-17 PROCEDURE — 1126F AMNT PAIN NOTED NONE PRSNT: CPT | Performed by: NURSE PRACTITIONER

## 2025-06-17 PROCEDURE — 3074F SYST BP LT 130 MM HG: CPT | Performed by: NURSE PRACTITIONER

## 2025-06-17 PROCEDURE — 3044F HG A1C LEVEL LT 7.0%: CPT | Performed by: NURSE PRACTITIONER

## 2025-06-17 PROCEDURE — 80053 COMPREHEN METABOLIC PANEL: CPT

## 2025-06-17 PROCEDURE — 3078F DIAST BP <80 MM HG: CPT | Performed by: NURSE PRACTITIONER

## 2025-06-17 PROCEDURE — 80061 LIPID PANEL: CPT

## 2025-06-17 RX ORDER — LOSARTAN POTASSIUM 100 MG/1
100 TABLET ORAL DAILY
Qty: 90 TABLET | Refills: 1 | Status: SHIPPED | OUTPATIENT
Start: 2025-06-17

## 2025-06-17 RX ORDER — METOPROLOL SUCCINATE 100 MG/1
100 TABLET, EXTENDED RELEASE ORAL DAILY
Qty: 90 TABLET | Refills: 1 | Status: SHIPPED | OUTPATIENT
Start: 2025-06-17

## 2025-06-17 RX ORDER — ROSUVASTATIN CALCIUM 20 MG/1
20 TABLET, COATED ORAL DAILY
Qty: 90 TABLET | Refills: 1 | Status: SHIPPED | OUTPATIENT
Start: 2025-06-17

## 2025-06-17 NOTE — ASSESSMENT & PLAN NOTE
Continue current medication and efforts with diet and exercise.     Orders:    Comprehensive Metabolic Panel; Future    Lipid Panel; Future    rosuvastatin (CRESTOR) 20 MG tablet; Take 1 tablet by mouth Daily.

## 2025-06-17 NOTE — ASSESSMENT & PLAN NOTE
Continue to follow up with cardiology, monitoring her bp and efforts with diet and exercise, continue current rx's, she is fasting today   Orders:    Comprehensive Metabolic Panel; Future    Lipid Panel; Future    losartan (COZAAR) 100 MG tablet; Take 1 tablet by mouth Daily.    metoprolol succinate XL (TOPROL-XL) 100 MG 24 hr tablet; Take 1 tablet by mouth Daily.

## 2025-06-17 NOTE — ASSESSMENT & PLAN NOTE
To work on diet, regular exercise, monitor sugars, check feet daily, see optometrist yearly or as directed.    Orders:    Comprehensive Metabolic Panel; Future    Lipid Panel; Future    Hemoglobin A1c; Future    Microalbumin / Creatinine Urine Ratio - Urine, Clean Catch; Future

## 2025-06-17 NOTE — PROGRESS NOTES
Chief Complaint  Diabetes (6 month follow up ) and Hypertension    Subjective          Jesse Espinal presents to [facility]    History of Present Illness  Diabetes  Current medication:  metformin   Tolerating medication:  yes  Last eye exam:  Dr Nicholas due her exam  Last foot exam:  1-2025  Refills needed:  no but uses [facility]   At home BS ranges:  140   Lab Results       Component                Value               Date                       HGBA1C                   6.20 (H)            01/13/2025                Hypertension  Current medication:  metoprolol, HCTZ, losartan, norvasc (sees cardiology and they refill the brilinta, HCTZ, norvasc and imdur )   Tolerating Medication:  yes  Checking BP at home and it is:  107-154 over 58-83  Needs refills:  yes needs losartan and metoprolol   Labs:  Lab Results       Component                Value               Date                       GLUCOSE                  132 (H)             04/23/2025                 BUN                      24 (H)              04/23/2025                 CREATININE               1.26 (H)            04/23/2025                 EGFRIFNONA               46 (L)              12/08/2021                 BCR                      19.0                04/23/2025                 K                        4.3                 04/23/2025                 CO2                      23.1                04/23/2025                 CALCIUM                  10.4                04/23/2025                 ALBUMIN                  4.2                 01/13/2025                 AST                      23                  01/13/2025                 ALT                      19                  01/13/2025              Hyperlipidemia  Current medication: crestor   Tolerating medication:  yes  Needs Refill:  yes     Lab Results       Component                Value               Date                       CHOL                     114                  2025                 CHLPL                    131                 2021                 TRIG                     150                 2025                 HDL                      33 (L)              2025                 LDL                      55                  2025              Postmenopausal 3- normal, decrease in hip, but may be overestimation in the lumbar spine, it had increased     PAST MEDICAL HISTORY changes since 2025:      COVID + , mild ( was in hospital)         GYNECOLOGICAL HISTORY:             CURRENT MEDICAL PROVIDERS:    Cardiologist: Dr Dailey     Nephrology: Chantelle Moran       PREVENTIVE HEALTH MAINTENANCE         Colonoscopy 24, colon polyps, next due     Hepatitis C Medicare Screening: was last done ; negative         Surgical History:         Appendectomy    Cholecystectomy    Hysterectomy     Coronary Artery Stent Placement: 13 and 14 LAD; Procedures: colonoscopy 13, diverticulosis and internal hemorrhoids heart cath , severe 3 vessel CAD     Procedures: cystoscopy 16         Family History:        Father:  at age 45; Cause of death was stroke;  liver disease     Mother:  at age 75; Cause of death was colon;  Coronary Artery Disease; Hypertension;  Breast Cancer; Colon Cancer;  Type 2 Diabetes     Brother(s): 4 brother(s) total; 2 ;  Coronary Artery Disease;  Lung Cancer     Sister(s): 3 sister(s) total; 2 ;  Coronary Artery Disease; Hyperlipidemia; Neurological/Genetic Cerebrovascular Accident (  70 );  pneumonia     Son(s): 3 son(s) total;  Hypertension; Hyperlipidemia         Social History:        Occupation:retired  [employer]       Marital Status:  2023     Children: 3 children                     Past Medical History:   Diagnosis Date    Acid reflux disease     Angina pectoris     Anxiety     CAD (coronary artery disease)      Depression     Diabetes mellitus, type II     Dysuria     Family history of colon cancer     Fatigue     Foot pain     GERD (gastroesophageal reflux disease)     Hematuria     Hypercholesterolemia     Hypertension     MORRIS (obstructive sleep apnea)     CPAP       Allergies   Allergen Reactions    Shellfish-Derived Products Hives and Swelling    Sulfa Antibiotics Swelling    Contrast Dye (Echo Or Unknown Ct/Mr) Swelling    Effient [Prasugrel] Hives        Past Surgical History:   Procedure Laterality Date    APPENDECTOMY      CARDIAC CATHETERIZATION  2013    SEVERE 3 VESSEL CAD    CHOLECYSTECTOMY  2013    DIVERTICULOSIS AND INTERNAL HEMORRHOIDS    COLONOSCOPY      COLONOSCOPY  2024    COLONOSCOPY N/A 2024    Procedure: COLONOSCOPY to cecum ti with hot/cold snare biopsies/polypectomies with clip x2;  Surgeon: Clair Reed MD;  Location: [facility] END[facility];  Service: Gastroenterology;  Laterality: N/A;  pre fam hx colon ca  post polyps diverticulosis hemorrhoids    CORONARY ANGIOPLASTY WITH STENT PLACEMENT  x2    CORONARY STENT PLACEMENT  2013    LAD x4    CYSTOSCOPY  2016    HYSTERECTOMY      OOPHORECTOMY          Social History     Tobacco Use    Smoking status: Former     Current packs/day: 0.00     Average packs/day: 2.5 packs/day for 40.0 years (100.0 ttl pk-yrs)     Types: Cigarettes     Start date:      Quit date:      Years since quittin.4    Smokeless tobacco: Never   Substance Use Topics    Alcohol use: Yes     Alcohol/week: 1.0 standard drink of alcohol     Types: 1 Glasses of wine per week     Comment: occ       Family History   Problem Relation Age of Onset    Colon cancer Mother     Coronary artery disease Mother     Hypertension Mother     Breast cancer Mother     Diabetes Mother     Heart disease Mother     Stroke Father     Liver disease Father     Coronary artery disease Sister     Hyperlipidemia Sister     Coronary artery disease Brother     Lung cancer  Brother     Hypertension Son     No Known Problems Maternal Grandmother     No Known Problems Maternal Grandfather     No Known Problems Paternal Grandmother     No Known Problems Paternal Grandfather     Malig Hyperthermia Neg Hx         Health Maintenance Due   Topic Date Due    TDAP/TD VACCINES (1 - Tdap) Never done    URINE MICROALBUMIN-CREATININE RATIO (uACR)  03/04/2022    COVID-19 Vaccine (4 - 2024-25 season) 09/01/2024    DIABETIC EYE EXAM  02/21/2025    HEMOGLOBIN A1C  07/13/2025        Current Outpatient Medications on File Prior to Visit   Medication Sig    acetaminophen (TYLENOL) 325 MG tablet Take 1 tablet by mouth Every 6 (Six) Hours As Needed.    amLODIPine (NORVASC) 5 MG tablet TAKE 1 TABLET BY MOUTH DAILY.    Blood Glucose Monitoring Suppl device 1 each 2 (Two) Times a Day. Pt to use to check blood sugar twice daily for E11.9    glucose blood (OneTouch Ultra) test strip 1 each by Other route See Admin Instructions. Use once to twice daily as needed    glucose blood test strip Use to check blood sugar twice daily for E11.9    hydroCHLOROthiazide 25 MG tablet TAKE 1 TABLET BY MOUTH EVERY NIGHT.    isosorbide mononitrate (IMDUR) 30 MG 24 hr tablet TAKE 1 TABLET BY MOUTH DAILY.    Lancets misc 1 each 2 (Two) Times a Day. Use to check blood sugar twice daily for E11.9    MAGNESIUM CITRATE PO Take  by mouth Every Other Day.    metFORMIN ER (GLUCOPHAGE-XR) 500 MG 24 hr tablet TAKE 1 TABLET BY MOUTH DAILY WITH BREAKFAST.    ticagrelor (Brilinta) 60 MG tablet tablet TAKE 1 TABLET BY MOUTH EVERY 12 HOURS    [DISCONTINUED] losartan (COZAAR) 100 MG tablet TAKE 1 TABLET BY MOUTH DAILY.    [DISCONTINUED] metoprolol succinate XL (TOPROL-XL) 100 MG 24 hr tablet TAKE 1 TABLET BY MOUTH DAILY.    [DISCONTINUED] rosuvastatin (CRESTOR) 20 MG tablet TAKE 1 TABLET BY MOUTH DAILY.     No current facility-administered medications on file prior to visit.       Immunization History   Administered Date(s) Administered     "ABRYSVO (RSV, 60+ or pregnant women 32-36 wks) 02/01/2024    COVID-19 (PFIZER) Purple Cap Monovalent 02/26/2021, 03/25/2021, 12/08/2021    Fluzone High-Dose 65+YRS 11/07/2022    Fluzone High-Dose 65+yrs 11/04/2021, 11/07/2022    Influenza, Unspecified 10/01/2020, 09/27/2024    PEDS-Pneumococcal Conjugate (PCV7) 12/14/2022    Pneumococcal Conjugate 20-Valent (PCV20) 12/14/2022    Shingrix 01/12/2024, 03/14/2024    Zostavax 09/20/2008       Review of Systems   Constitutional:  Negative for fatigue and fever.   Respiratory:  Negative for cough and shortness of breath.    Cardiovascular:  Negative for chest pain, palpitations and leg swelling.        Objective     Vitals:    06/17/25 0901   BP: 128/79   BP Location: Right arm   Patient Position: Sitting   Cuff Size: Adult   Pulse: 60   Temp: 97.9 °F (36.6 °C)   TempSrc: Oral   SpO2: 94%   Weight: 89.4 kg (197 lb)   Height: 162.6 cm (64\")            Physical Exam  Vitals reviewed.   Constitutional:       General: She is not in acute distress.     Appearance: Normal appearance.   Neck:      Vascular: No carotid bruit.   Cardiovascular:      Rate and Rhythm: Normal rate and regular rhythm.      Heart sounds: Normal heart sounds. No murmur heard.  Pulmonary:      Effort: Pulmonary effort is normal. No respiratory distress.      Breath sounds: Normal breath sounds.   Musculoskeletal:      Right lower leg: No edema.      Left lower leg: No edema.   Neurological:      Mental Status: She is alert.   Psychiatric:         Mood and Affect: Mood normal.         Behavior: Behavior normal.         Result Review :     The following data was reviewed by: SPIKE Jj on 06/17/2025:                       Assessment and Plan      Assessment & Plan  Essential hypertension    Continue to follow up with cardiology, monitoring her bp and efforts with diet and exercise, continue current rx's, she is fasting today   Orders:    Comprehensive Metabolic Panel; Future    Lipid Panel; " Future    losartan (COZAAR) 100 MG tablet; Take 1 tablet by mouth Daily.    metoprolol succinate XL (TOPROL-XL) 100 MG 24 hr tablet; Take 1 tablet by mouth Daily.    Other hyperlipidemia   Continue current medication and efforts with diet and exercise.     Orders:    Comprehensive Metabolic Panel; Future    Lipid Panel; Future    rosuvastatin (CRESTOR) 20 MG tablet; Take 1 tablet by mouth Daily.    Postmenopausal  Reviewed her dexa, fall prevention and getting adequate calcium in her diet and vit d        Type 2 diabetes mellitus with complication, without long-term current use of insulin  To work on diet, regular exercise, monitor sugars, check feet daily, see optometrist yearly or as directed.    Orders:    Comprehensive Metabolic Panel; Future    Lipid Panel; Future    Hemoglobin A1c; Future    Microalbumin / Creatinine Urine Ratio - Urine, Clean Catch; Future                        Follow Up     Return for followup pending lab results.    Patient was given instructions and counseling regarding her condition or for health maintenance advice. Please see specific information pulled into the AVS if appropriate.

## 2025-06-18 ENCOUNTER — RESULTS FOLLOW-UP (OUTPATIENT)
Dept: FAMILY MEDICINE CLINIC | Age: 70
End: 2025-06-18
Payer: MEDICARE

## 2025-06-18 NOTE — LETTER
Jesse Espinal  [address] [place] [id number]    June 18, 2025     Jesse,     Your glucose was 125.  Your calcium was slightly elevated.  Your kidney function was off, similar to the past, but off more than a month ago.  Your protein /urine test was in range.  Your cholesterol panel was right at goal.  Your A1C was at goal.  I will have this lab forwarded to your nephrologist.  Make sure to get adequate water intake daily.     Below are the results from your recent visit:    Resulted Orders   Hemoglobin A1c   Result Value Ref Range    Hemoglobin A1C 6.60 (H) 4.80 - 5.60 %   Comprehensive Metabolic Panel   Result Value Ref Range    Glucose 125 (H) 65 - 99 mg/dL    BUN 18.0 8.0 - 23.0 mg/dL    Creatinine 1.42 (H) 0.57 - 1.00 mg/dL    Sodium 137 136 - 145 mmol/L    Potassium 4.2 3.5 - 5.2 mmol/L    Chloride 103 98 - 107 mmol/L    CO2 22.5 22.0 - 29.0 mmol/L    Calcium 10.7 (H) 8.6 - 10.5 mg/dL    Total Protein 8.2 6.0 - 8.5 g/dL    Albumin 4.5 3.5 - 5.2 g/dL    ALT (SGPT) 24 1 - 33 U/L    AST (SGOT) 28 1 - 32 U/L    Alkaline Phosphatase 73 39 - 117 U/L    Total Bilirubin 0.6 0.0 - 1.2 mg/dL    Globulin 3.7 gm/dL    A/G Ratio 1.2 g/dL    BUN/Creatinine Ratio 12.7 7.0 - 25.0    Anion Gap 11.5 5.0 - 15.0 mmol/L    eGFR 39.6 (L) >60.0 mL/min/1.73   Lipid Panel   Result Value Ref Range    Total Cholesterol 111 0 - 200 mg/dL    Triglycerides 126 0 - 150 mg/dL    HDL Cholesterol 36 (L) 40 - 60 mg/dL    LDL Cholesterol  52 0 - 100 mg/dL    VLDL Cholesterol 23 5 - 40 mg/dL    LDL/HDL Ratio 1.38    Microalbumin / Creatinine Urine Ratio - Urine, Clean Catch   Result Value Ref Range    Microalbumin/Creatinine Ratio 23.9 0.0 - 29.0 mg/g    Creatinine, Urine 83.8 mg/dL    Microalbumin, Urine 2.0 mg/dL         If you have any questions or concerns, please don't hesitate to call.         Sincerely,        SPIKE Jj

## 2025-08-11 RX ORDER — HYDROCHLOROTHIAZIDE 25 MG/1
25 TABLET ORAL NIGHTLY
Qty: 90 TABLET | Refills: 2 | Status: SHIPPED | OUTPATIENT
Start: 2025-08-11

## 2025-08-22 DIAGNOSIS — E11.8 TYPE 2 DIABETES MELLITUS WITH COMPLICATION, WITHOUT LONG-TERM CURRENT USE OF INSULIN: ICD-10-CM

## 2025-08-22 DIAGNOSIS — I10 ESSENTIAL HYPERTENSION: ICD-10-CM

## 2025-08-22 DIAGNOSIS — I25.10 CORONARY ARTERY DISEASE INVOLVING NATIVE CORONARY ARTERY OF NATIVE HEART WITHOUT ANGINA PECTORIS: ICD-10-CM

## 2025-08-22 RX ORDER — AMLODIPINE BESYLATE 5 MG/1
5 TABLET ORAL DAILY
Qty: 90 TABLET | Refills: 0 | Status: SHIPPED | OUTPATIENT
Start: 2025-08-22

## 2025-08-22 RX ORDER — METFORMIN HYDROCHLORIDE 500 MG/1
500 TABLET, EXTENDED RELEASE ORAL
Qty: 90 TABLET | Refills: 1 | Status: SHIPPED | OUTPATIENT
Start: 2025-08-22

## 2025-08-22 RX ORDER — ISOSORBIDE MONONITRATE 30 MG/1
30 TABLET, EXTENDED RELEASE ORAL DAILY
Qty: 90 TABLET | Refills: 1 | Status: SHIPPED | OUTPATIENT
Start: 2025-08-22

## (undated) DEVICE — THE SINGLE USE ETRAP – POLYP TRAP IS USED FOR SUCTION RETRIEVAL OF ENDOSCOPICALLY REMOVED POLYPS.: Brand: ETRAP

## (undated) DEVICE — SENSR O2 OXIMAX FNGR A/ 18IN NONSTR

## (undated) DEVICE — CANN O2 ETCO2 FITS ALL CONN CO2 SMPL A/ 7IN DISP LF

## (undated) DEVICE — SNAR POLYP SENSATION MD/OVL FLX 27MM/LP 2.4MM 240CM 1P/U

## (undated) DEVICE — PATIENT RETURN ELECTRODE, SINGLE-USE, CONTACT QUALITY MONITORING, ADULT, WITH 9FT CORD, FOR PATIENTS WEIGING OVER 33LBS. (15KG): Brand: MEGADYNE

## (undated) DEVICE — KT ORCA ORCAPOD DISP STRL

## (undated) DEVICE — ADAPT CLN BIOGUARD AIR/H2O DISP

## (undated) DEVICE — TUBING, SUCTION, 1/4" X 10', STRAIGHT: Brand: MEDLINE

## (undated) DEVICE — LN SMPL CO2 SHTRM SD STREAM W/M LUER